# Patient Record
Sex: FEMALE | Race: WHITE | HISPANIC OR LATINO | Employment: FULL TIME | URBAN - METROPOLITAN AREA
[De-identification: names, ages, dates, MRNs, and addresses within clinical notes are randomized per-mention and may not be internally consistent; named-entity substitution may affect disease eponyms.]

---

## 2017-03-09 ENCOUNTER — HOSPITAL ENCOUNTER (EMERGENCY)
Facility: HOSPITAL | Age: 37
Discharge: HOME | End: 2017-03-09
Admitting: EMERGENCY MEDICINE
Payer: COMMERCIAL

## 2017-03-09 ENCOUNTER — HOSPITAL ENCOUNTER (OUTPATIENT)
Dept: RADIOLOGY | Facility: HOSPITAL | Age: 37
Discharge: HOME | End: 2017-03-09
Attending: EMERGENCY MEDICINE
Payer: COMMERCIAL

## 2017-03-09 DIAGNOSIS — R52 PAIN: ICD-10-CM

## 2017-03-09 PROCEDURE — 71101 X-RAY EXAM UNILAT RIBS/CHEST: CPT

## 2017-03-09 PROCEDURE — 71120 X-RAY EXAM BREASTBONE 2/>VWS: CPT

## 2017-03-09 PROCEDURE — 99283 EMERGENCY DEPT VISIT LOW MDM: CPT

## 2017-03-09 RX ADMIN — IBUPROFEN 800 MG: 200 TABLET, SUGAR COATED ORAL at 22:50

## 2017-03-10 VITALS
OXYGEN SATURATION: 98 % | TEMPERATURE: 97.8 F | SYSTOLIC BLOOD PRESSURE: 128 MMHG | HEART RATE: 74 BPM | WEIGHT: 287 LBS | RESPIRATION RATE: 18 BRPM | DIASTOLIC BLOOD PRESSURE: 70 MMHG

## 2017-04-04 ENCOUNTER — HOSPITAL ENCOUNTER (EMERGENCY)
Facility: HOSPITAL | Age: 37
Discharge: HOME | End: 2017-04-04
Attending: EMERGENCY MEDICINE | Admitting: EMERGENCY MEDICINE
Payer: COMMERCIAL

## 2017-04-04 ENCOUNTER — APPOINTMENT (EMERGENCY)
Dept: CT IMAGING | Facility: HOSPITAL | Age: 37
End: 2017-04-04
Payer: COMMERCIAL

## 2017-04-04 VITALS
RESPIRATION RATE: 16 BRPM | TEMPERATURE: 98 F | HEART RATE: 79 BPM | OXYGEN SATURATION: 100 % | WEIGHT: 271 LBS | SYSTOLIC BLOOD PRESSURE: 119 MMHG | DIASTOLIC BLOOD PRESSURE: 69 MMHG

## 2017-04-04 DIAGNOSIS — K52.9 COLITIS: Primary | ICD-10-CM

## 2017-04-04 LAB
ALBUMIN SERPL BCP-MCNC: 3.7 G/DL (ref 3.5–5)
ALP SERPL-CCNC: 93 U/L (ref 46–116)
ALT SERPL W P-5'-P-CCNC: 20 U/L (ref 12–78)
ANION GAP SERPL CALCULATED.3IONS-SCNC: 8 MMOL/L (ref 4–13)
AST SERPL W P-5'-P-CCNC: 12 U/L (ref 5–45)
BASOPHILS # BLD AUTO: 0.01 THOUSANDS/ΜL (ref 0–0.1)
BASOPHILS NFR BLD AUTO: 0 % (ref 0–1)
BILIRUB SERPL-MCNC: 0.52 MG/DL (ref 0.2–1)
BILIRUB UR QL STRIP: NEGATIVE
BUN SERPL-MCNC: 12 MG/DL (ref 5–25)
CALCIUM SERPL-MCNC: 8.4 MG/DL (ref 8.3–10.1)
CHLORIDE SERPL-SCNC: 100 MMOL/L (ref 100–108)
CLARITY UR: CLEAR
CO2 SERPL-SCNC: 29 MMOL/L (ref 21–32)
COLOR UR: YELLOW
COLOR, POC: YELLOW
CREAT SERPL-MCNC: 0.72 MG/DL (ref 0.6–1.3)
EOSINOPHIL # BLD AUTO: 0.09 THOUSAND/ΜL (ref 0–0.61)
EOSINOPHIL NFR BLD AUTO: 1 % (ref 0–6)
ERYTHROCYTE [DISTWIDTH] IN BLOOD BY AUTOMATED COUNT: 16.7 % (ref 11.6–15.1)
GFR SERPL CREATININE-BSD FRML MDRD: >60 ML/MIN/1.73SQ M
GLUCOSE SERPL-MCNC: 105 MG/DL (ref 65–140)
GLUCOSE UR STRIP-MCNC: NEGATIVE MG/DL
HCG UR QL: NEGATIVE
HCT VFR BLD AUTO: 41.2 % (ref 34.8–46.1)
HGB BLD-MCNC: 13.1 G/DL (ref 11.5–15.4)
HGB UR QL STRIP.AUTO: NEGATIVE
KETONES UR STRIP-MCNC: NEGATIVE MG/DL
LEUKOCYTE ESTERASE UR QL STRIP: NEGATIVE
LIPASE SERPL-CCNC: 80 U/L (ref 73–393)
LYMPHOCYTES # BLD AUTO: 1.09 THOUSANDS/ΜL (ref 0.6–4.47)
LYMPHOCYTES NFR BLD AUTO: 15 % (ref 14–44)
MCH RBC QN AUTO: 26.4 PG (ref 26.8–34.3)
MCHC RBC AUTO-ENTMCNC: 31.8 G/DL (ref 31.4–37.4)
MCV RBC AUTO: 83 FL (ref 82–98)
MONOCYTES # BLD AUTO: 0.45 THOUSAND/ΜL (ref 0.17–1.22)
MONOCYTES NFR BLD AUTO: 6 % (ref 4–12)
NEUTROPHILS # BLD AUTO: 5.64 THOUSANDS/ΜL (ref 1.85–7.62)
NEUTS SEG NFR BLD AUTO: 78 % (ref 43–75)
NITRITE UR QL STRIP: NEGATIVE
NRBC BLD AUTO-RTO: 0 /100 WBCS
PH UR STRIP.AUTO: 5 [PH] (ref 4.5–8)
PLATELET # BLD AUTO: 245 THOUSANDS/UL (ref 149–390)
PMV BLD AUTO: 10.6 FL (ref 8.9–12.7)
POTASSIUM SERPL-SCNC: 3.7 MMOL/L (ref 3.5–5.3)
PROT SERPL-MCNC: 7.2 G/DL (ref 6.4–8.2)
PROT UR STRIP-MCNC: NEGATIVE MG/DL
RBC # BLD AUTO: 4.97 MILLION/UL (ref 3.81–5.12)
SODIUM SERPL-SCNC: 137 MMOL/L (ref 136–145)
SP GR UR STRIP.AUTO: >=1.03 (ref 1–1.03)
UROBILINOGEN UR QL STRIP.AUTO: 0.2 E.U./DL
WBC # BLD AUTO: 7.28 THOUSAND/UL (ref 4.31–10.16)

## 2017-04-04 PROCEDURE — 74177 CT ABD & PELVIS W/CONTRAST: CPT

## 2017-04-04 PROCEDURE — 85025 COMPLETE CBC W/AUTO DIFF WBC: CPT | Performed by: EMERGENCY MEDICINE

## 2017-04-04 PROCEDURE — 81003 URINALYSIS AUTO W/O SCOPE: CPT

## 2017-04-04 PROCEDURE — 96375 TX/PRO/DX INJ NEW DRUG ADDON: CPT

## 2017-04-04 PROCEDURE — 81002 URINALYSIS NONAUTO W/O SCOPE: CPT

## 2017-04-04 PROCEDURE — 80053 COMPREHEN METABOLIC PANEL: CPT | Performed by: EMERGENCY MEDICINE

## 2017-04-04 PROCEDURE — 83690 ASSAY OF LIPASE: CPT | Performed by: EMERGENCY MEDICINE

## 2017-04-04 PROCEDURE — 96361 HYDRATE IV INFUSION ADD-ON: CPT

## 2017-04-04 PROCEDURE — 96374 THER/PROPH/DIAG INJ IV PUSH: CPT

## 2017-04-04 PROCEDURE — 81025 URINE PREGNANCY TEST: CPT

## 2017-04-04 PROCEDURE — 99284 EMERGENCY DEPT VISIT MOD MDM: CPT

## 2017-04-04 PROCEDURE — 36415 COLL VENOUS BLD VENIPUNCTURE: CPT | Performed by: EMERGENCY MEDICINE

## 2017-04-04 RX ORDER — PHENTERMINE HYDROCHLORIDE 37.5 MG/1
37.5 TABLET ORAL
COMMUNITY
End: 2018-12-10 | Stop reason: SDUPTHER

## 2017-04-04 RX ORDER — LISINOPRIL 5 MG/1
5 TABLET ORAL DAILY
COMMUNITY
End: 2018-12-10 | Stop reason: SDUPTHER

## 2017-04-04 RX ORDER — ONDANSETRON 2 MG/ML
4 INJECTION INTRAMUSCULAR; INTRAVENOUS ONCE
Status: COMPLETED | OUTPATIENT
Start: 2017-04-04 | End: 2017-04-04

## 2017-04-04 RX ORDER — LEVOTHYROXINE SODIUM 0.2 MG/1
200 TABLET ORAL DAILY
COMMUNITY
End: 2018-12-10 | Stop reason: SDUPTHER

## 2017-04-04 RX ORDER — ONDANSETRON 4 MG/1
4 TABLET, FILM COATED ORAL EVERY 6 HOURS
Qty: 15 TABLET | Refills: 0 | Status: SHIPPED | OUTPATIENT
Start: 2017-04-04 | End: 2018-12-10

## 2017-04-04 RX ORDER — MORPHINE SULFATE 4 MG/ML
4 INJECTION, SOLUTION INTRAMUSCULAR; INTRAVENOUS ONCE
Status: COMPLETED | OUTPATIENT
Start: 2017-04-04 | End: 2017-04-04

## 2017-04-04 RX ORDER — DIPHENOXYLATE HYDROCHLORIDE AND ATROPINE SULFATE 2.5; .025 MG/1; MG/1
1 TABLET ORAL 4 TIMES DAILY PRN
Qty: 20 TABLET | Refills: 0 | Status: SHIPPED | OUTPATIENT
Start: 2017-04-04 | End: 2018-12-10

## 2017-04-04 RX ADMIN — MORPHINE SULFATE 4 MG: 4 INJECTION, SOLUTION INTRAMUSCULAR; INTRAVENOUS at 19:38

## 2017-04-04 RX ADMIN — SODIUM CHLORIDE 1000 ML: 0.9 INJECTION, SOLUTION INTRAVENOUS at 19:37

## 2017-04-04 RX ADMIN — ONDANSETRON 4 MG: 2 INJECTION INTRAMUSCULAR; INTRAVENOUS at 19:38

## 2017-04-04 RX ADMIN — IOHEXOL 100 ML: 350 INJECTION, SOLUTION INTRAVENOUS at 20:20

## 2018-06-25 ENCOUNTER — TRANSCRIBE ORDERS (OUTPATIENT)
Dept: ADMINISTRATIVE | Facility: HOSPITAL | Age: 38
End: 2018-06-25

## 2018-06-25 DIAGNOSIS — N28.9 KIDNEY DISEASE: Primary | ICD-10-CM

## 2018-06-25 DIAGNOSIS — Z12.39 SCREENING BREAST EXAMINATION: ICD-10-CM

## 2018-06-26 ENCOUNTER — HOSPITAL ENCOUNTER (OUTPATIENT)
Dept: ULTRASOUND IMAGING | Facility: HOSPITAL | Age: 38
Discharge: HOME | End: 2018-06-26
Payer: COMMERCIAL

## 2018-06-26 ENCOUNTER — HOSPITAL ENCOUNTER (OUTPATIENT)
Dept: MAMMOGRAPHY | Facility: HOSPITAL | Age: 38
Discharge: HOME | End: 2018-06-26
Payer: COMMERCIAL

## 2018-06-26 DIAGNOSIS — N28.9 KIDNEY DISEASE: ICD-10-CM

## 2018-06-26 DIAGNOSIS — Z12.39 SCREENING BREAST EXAMINATION: ICD-10-CM

## 2018-06-26 PROCEDURE — 77067 SCR MAMMO BI INCL CAD: CPT

## 2018-06-26 PROCEDURE — 76770 US EXAM ABDO BACK WALL COMP: CPT

## 2018-07-02 ENCOUNTER — TELEPHONE (OUTPATIENT)
Dept: FAMILY MEDICINE CLINIC | Facility: CLINIC | Age: 38
End: 2018-07-02

## 2018-07-05 ENCOUNTER — TELEPHONE (OUTPATIENT)
Dept: FAMILY MEDICINE CLINIC | Facility: CLINIC | Age: 38
End: 2018-07-05

## 2018-07-06 ENCOUNTER — TELEPHONE (OUTPATIENT)
Dept: FAMILY MEDICINE CLINIC | Facility: CLINIC | Age: 38
End: 2018-07-06

## 2018-12-10 ENCOUNTER — OFFICE VISIT (OUTPATIENT)
Dept: FAMILY MEDICINE CLINIC | Facility: CLINIC | Age: 38
End: 2018-12-10
Payer: COMMERCIAL

## 2018-12-10 VITALS
RESPIRATION RATE: 16 BRPM | DIASTOLIC BLOOD PRESSURE: 88 MMHG | SYSTOLIC BLOOD PRESSURE: 130 MMHG | WEIGHT: 292.6 LBS | HEIGHT: 66 IN | BODY MASS INDEX: 47.02 KG/M2 | OXYGEN SATURATION: 96 % | HEART RATE: 86 BPM | TEMPERATURE: 99.3 F

## 2018-12-10 DIAGNOSIS — E66.01 CLASS 3 SEVERE OBESITY DUE TO EXCESS CALORIES WITH SERIOUS COMORBIDITY AND BODY MASS INDEX (BMI) OF 45.0 TO 49.9 IN ADULT (HCC): ICD-10-CM

## 2018-12-10 DIAGNOSIS — E55.9 VITAMIN D DEFICIENCY: ICD-10-CM

## 2018-12-10 DIAGNOSIS — I10 ESSENTIAL HYPERTENSION: ICD-10-CM

## 2018-12-10 DIAGNOSIS — E03.8 OTHER SPECIFIED HYPOTHYROIDISM: Primary | ICD-10-CM

## 2018-12-10 PROBLEM — E66.813 CLASS 3 SEVERE OBESITY DUE TO EXCESS CALORIES WITH SERIOUS COMORBIDITY AND BODY MASS INDEX (BMI) OF 45.0 TO 49.9 IN ADULT (HCC): Status: ACTIVE | Noted: 2018-12-10

## 2018-12-10 PROCEDURE — 3075F SYST BP GE 130 - 139MM HG: CPT | Performed by: NURSE PRACTITIONER

## 2018-12-10 PROCEDURE — 3079F DIAST BP 80-89 MM HG: CPT | Performed by: NURSE PRACTITIONER

## 2018-12-10 PROCEDURE — 3008F BODY MASS INDEX DOCD: CPT | Performed by: NURSE PRACTITIONER

## 2018-12-10 PROCEDURE — 1036F TOBACCO NON-USER: CPT | Performed by: NURSE PRACTITIONER

## 2018-12-10 PROCEDURE — 99214 OFFICE O/P EST MOD 30 MIN: CPT | Performed by: NURSE PRACTITIONER

## 2018-12-10 RX ORDER — HYDROCHLOROTHIAZIDE 25 MG/1
25 TABLET ORAL DAILY
COMMUNITY
End: 2018-12-10 | Stop reason: SDUPTHER

## 2018-12-10 RX ORDER — ERGOCALCIFEROL 1.25 MG/1
CAPSULE ORAL
Refills: 1 | COMMUNITY
Start: 2018-12-06 | End: 2018-12-31 | Stop reason: SDUPTHER

## 2018-12-10 RX ORDER — HYDROCHLOROTHIAZIDE 25 MG/1
25 TABLET ORAL DAILY
Qty: 30 TABLET | Refills: 5 | Status: SHIPPED | OUTPATIENT
Start: 2018-12-10 | End: 2019-08-16 | Stop reason: SDUPTHER

## 2018-12-10 RX ORDER — PHENTERMINE HYDROCHLORIDE 37.5 MG/1
37.5 TABLET ORAL DAILY
Qty: 30 TABLET | Refills: 0 | Status: SHIPPED | OUTPATIENT
Start: 2018-12-10 | End: 2019-08-16 | Stop reason: SDUPTHER

## 2018-12-10 RX ORDER — LEVOTHYROXINE SODIUM 0.2 MG/1
200 TABLET ORAL DAILY
Qty: 30 TABLET | Refills: 5 | Status: SHIPPED | OUTPATIENT
Start: 2018-12-10 | End: 2019-08-16 | Stop reason: SDUPTHER

## 2018-12-10 RX ORDER — LISINOPRIL 5 MG/1
5 TABLET ORAL DAILY
Qty: 30 TABLET | Refills: 5 | Status: SHIPPED | OUTPATIENT
Start: 2018-12-10 | End: 2019-08-16 | Stop reason: SDUPTHER

## 2018-12-10 NOTE — ASSESSMENT & PLAN NOTE
Was started on phentermine by Dr Ana Salgado  Will renew medication and have her return in 2 weeks to evaluate her blood pressure

## 2018-12-10 NOTE — PROGRESS NOTES
Assessment/Plan:    Other specified hypothyroidism  Will get a TSH T3 and T4 and adjust medications if needed    Essential hypertension  Blood pressure currently controlled with lisinopril and HCTZ  Will continue with current meds    Class 3 severe obesity due to excess calories with serious comorbidity and body mass index (BMI) of 45 0 to 49 9 in adult [facility])  Was started on phentermine by Dr Luana Cruz  Will renew medication and have her return in 2 weeks to evaluate her blood pressure  Vitamin D deficiency  Will hold vitamin D for now and recheck her level  Will restart as needed       Diagnoses and all orders for this visit:    Other specified hypothyroidism  -     TSH, 3rd generation with Free T4 reflex; Future  -     levothyroxine 200 mcg tablet; Take 1 tablet (200 mcg total) by mouth daily    Essential hypertension  -     Comprehensive metabolic panel; Future  -     hydrochlorothiazide (HYDRODIURIL) 25 mg tablet; Take 1 tablet (25 mg total) by mouth daily  -     lisinopril (ZESTRIL) 5 mg tablet; Take 1 tablet (5 mg total) by mouth daily    Class 3 severe obesity due to excess calories with serious comorbidity and body mass index (BMI) of 45 0 to 49 9 in adult ([facility])  -     CBC and differential; Future  -     Comprehensive metabolic panel; Future  -     Lipid Panel with Direct LDL reflex; Future  -     phentermine (ADIPEX-P) 37 5 MG tablet; Take 1 tablet (37 5 mg total) by mouth daily    Vitamin D deficiency  -     Vitamin D 25 hydroxy; Future    Other orders  -     ergocalciferol (VITAMIN D2) 50,000 units; TAKE ONE SOFTGEL CAPSULE BY MOUTH ONCE EVERY WEEK WITH DINNER  -     Discontinue: hydrochlorothiazide (HYDRODIURIL) 25 mg tablet; Take 25 mg by mouth daily          Subjective:      Patient ID: Mathew Hanson is a 41 y o  female here for med refill    HPI  Lost her insurance and has not been able to get her medication  Having left heel pain  The pain is sharp  Works standing all day    The following portions of the patient's history were reviewed and updated as appropriate:   She  has a past medical history of Class 3 severe obesity due to excess calories with serious comorbidity and body mass index (BMI) of 45 0 to 49 9 in [facility]) (12/10/2018); Depression; Disease of thyroid gland; Edema of both legs (2012); Hypertension; Macrocytosis (2012); and Vitamin D deficiency  She   Patient Active Problem List    Diagnosis Date Noted    Other specified hypothyroidism 12/10/2018    Essential hypertension 12/10/2018    Class 3 severe obesity due to excess calories with serious comorbidity and body mass index (BMI) of 45 0 to 49 9 in [facility]) 12/10/2018    Vitamin D deficiency 12/10/2018     She  has a past surgical history that includes Thyroid surgery and Tubal ligation (Bilateral, 2009)  Her family history includes Diabetes in her family  She  reports that she has never smoked  She has never used smokeless tobacco  She reports that she drinks alcohol  She reports that she does not use drugs  Current Outpatient Prescriptions   Medication Sig Dispense Refill    ergocalciferol (VITAMIN D2) 50,000 units TAKE ONE SOFTGEL CAPSULE BY MOUTH ONCE EVERY WEEK WITH DINNER  1    hydrochlorothiazide (HYDRODIURIL) 25 mg tablet Take 1 tablet (25 mg total) by mouth daily 30 tablet 5    levothyroxine 200 mcg tablet Take 1 tablet (200 mcg total) by mouth daily 30 tablet 5    lisinopril (ZESTRIL) 5 mg tablet Take 1 tablet (5 mg total) by mouth daily 30 tablet 5    phentermine (ADIPEX-P) 37 5 MG tablet Take 1 tablet (37 5 mg total) by mouth daily 30 tablet 0     No current facility-administered medications for this visit        Current Outpatient Prescriptions on File Prior to Visit   Medication Sig    [DISCONTINUED] levothyroxine 200 mcg tablet Take 200 mcg by mouth daily    [DISCONTINUED] lisinopril (ZESTRIL) 5 mg tablet Take 5 mg by mouth daily    [DISCONTINUED] phentermine (ADIPEX-P) 37 5 MG tablet Take 37 5 mg by mouth every morning before breakfast    [DISCONTINUED] diphenoxylate-atropine (LOMOTIL) 2 5-0 025 mg per tablet Take 1 tablet by mouth 4 (four) times a day as needed for diarrhea    [DISCONTINUED] ondansetron (ZOFRAN) 4 mg tablet Take 1 tablet by mouth every 6 (six) hours     No current facility-administered medications on file prior to visit  She has No Known Allergies       Review of Systems   Constitutional: Negative  HENT: Negative  Eyes: Negative  Respiratory: Negative  Cardiovascular: Negative  Gastrointestinal: Negative  Endocrine: Negative  Genitourinary: Negative  Skin: Negative  Allergic/Immunologic: Negative  Neurological: Negative  Hematological: Negative  Psychiatric/Behavioral: Negative  Objective:      /88   Pulse 86   Temp 99 3 °F (37 4 °C) (Tympanic)   Resp 16   Ht 5' 6" (1 676 m)   Wt 133 kg (292 lb 9 6 oz)   SpO2 96%   BMI 47 23 kg/m²          Physical Exam   Constitutional: She is oriented to person, place, and time  She appears well-developed and well-nourished  HENT:   Head: Normocephalic  Right Ear: External ear normal    Left Ear: External ear normal    Nose: Nose normal    Mouth/Throat: Oropharynx is clear and moist    Eyes: Conjunctivae and EOM are normal    Neck: Normal range of motion  Neck supple  No thyromegaly present  Cardiovascular: Normal rate, regular rhythm, normal heart sounds and intact distal pulses  Pulmonary/Chest: Effort normal and breath sounds normal    Abdominal: Soft  Bowel sounds are normal    Musculoskeletal: Normal range of motion  Lymphadenopathy:     She has no cervical adenopathy  Neurological: She is alert and oriented to person, place, and time  She has normal reflexes  Skin: Skin is warm and dry  Psychiatric: She has a normal mood and affect   Her behavior is normal  Judgment and thought content normal

## 2018-12-29 ENCOUNTER — APPOINTMENT (OUTPATIENT)
Dept: LAB | Facility: HOSPITAL | Age: 38
End: 2018-12-29
Payer: COMMERCIAL

## 2018-12-29 ENCOUNTER — TRANSCRIBE ORDERS (OUTPATIENT)
Dept: ADMINISTRATIVE | Facility: HOSPITAL | Age: 38
End: 2018-12-29

## 2018-12-29 DIAGNOSIS — E66.01 CLASS 3 SEVERE OBESITY DUE TO EXCESS CALORIES WITH SERIOUS COMORBIDITY AND BODY MASS INDEX (BMI) OF 45.0 TO 49.9 IN ADULT (HCC): ICD-10-CM

## 2018-12-29 DIAGNOSIS — E03.8 OTHER SPECIFIED HYPOTHYROIDISM: ICD-10-CM

## 2018-12-29 DIAGNOSIS — I10 ESSENTIAL HYPERTENSION: ICD-10-CM

## 2018-12-29 DIAGNOSIS — E55.9 VITAMIN D DEFICIENCY: ICD-10-CM

## 2018-12-29 LAB
25(OH)D3 SERPL-MCNC: 24.8 NG/ML (ref 30–100)
ALBUMIN SERPL BCP-MCNC: 3.8 G/DL (ref 3–5.2)
ALP SERPL-CCNC: 84 U/L (ref 43–122)
ALT SERPL W P-5'-P-CCNC: 23 U/L (ref 9–52)
ANION GAP SERPL CALCULATED.3IONS-SCNC: 8 MMOL/L (ref 5–14)
AST SERPL W P-5'-P-CCNC: 22 U/L (ref 14–36)
BASOPHILS # BLD AUTO: 0 THOUSANDS/ΜL (ref 0–0.1)
BASOPHILS NFR BLD AUTO: 1 % (ref 0–1)
BILIRUB SERPL-MCNC: 0.4 MG/DL
BUN SERPL-MCNC: 14 MG/DL (ref 5–25)
CALCIUM SERPL-MCNC: 8.6 MG/DL (ref 8.4–10.2)
CHLORIDE SERPL-SCNC: 104 MMOL/L (ref 97–108)
CHOLEST SERPL-MCNC: 163 MG/DL
CO2 SERPL-SCNC: 27 MMOL/L (ref 22–30)
CREAT SERPL-MCNC: 0.69 MG/DL (ref 0.6–1.2)
EOSINOPHIL # BLD AUTO: 0.3 THOUSAND/ΜL (ref 0–0.4)
EOSINOPHIL NFR BLD AUTO: 5 % (ref 0–6)
ERYTHROCYTE [DISTWIDTH] IN BLOOD BY AUTOMATED COUNT: 16.6 %
GFR SERPL CREATININE-BSD FRML MDRD: 112 ML/MIN/1.73SQ M
GLUCOSE P FAST SERPL-MCNC: 99 MG/DL (ref 70–99)
HCT VFR BLD AUTO: 36.2 % (ref 36–46)
HDLC SERPL-MCNC: 44 MG/DL (ref 40–59)
HGB BLD-MCNC: 11.3 G/DL (ref 12–16)
LDLC SERPL CALC-MCNC: 98 MG/DL
LYMPHOCYTES # BLD AUTO: 0.9 THOUSANDS/ΜL (ref 0.5–4)
LYMPHOCYTES NFR BLD AUTO: 14 % (ref 25–45)
MCH RBC QN AUTO: 24.6 PG (ref 26–34)
MCHC RBC AUTO-ENTMCNC: 31.3 G/DL (ref 31–36)
MCV RBC AUTO: 79 FL (ref 80–100)
MONOCYTES # BLD AUTO: 0.5 THOUSAND/ΜL (ref 0.2–0.9)
MONOCYTES NFR BLD AUTO: 9 % (ref 1–10)
NEUTROPHILS # BLD AUTO: 4.3 THOUSANDS/ΜL (ref 1.8–7.8)
NEUTS SEG NFR BLD AUTO: 71 % (ref 45–65)
PLATELET # BLD AUTO: 267 THOUSANDS/UL (ref 150–450)
PLATELET BLD QL SMEAR: ADEQUATE
PMV BLD AUTO: 8.5 FL (ref 8.9–12.7)
POTASSIUM SERPL-SCNC: 3.4 MMOL/L (ref 3.6–5)
PROT SERPL-MCNC: 7.1 G/DL (ref 5.9–8.4)
RBC # BLD AUTO: 4.6 MILLION/UL (ref 4–5.2)
RBC MORPH BLD: NORMAL
SODIUM SERPL-SCNC: 139 MMOL/L (ref 137–147)
TRIGL SERPL-MCNC: 107 MG/DL
TSH SERPL DL<=0.05 MIU/L-ACNC: 1.43 UIU/ML (ref 0.47–4.68)
WBC # BLD AUTO: 6 THOUSAND/UL (ref 4.5–11)

## 2018-12-29 PROCEDURE — 82306 VITAMIN D 25 HYDROXY: CPT

## 2018-12-29 PROCEDURE — 85025 COMPLETE CBC W/AUTO DIFF WBC: CPT

## 2018-12-29 PROCEDURE — 84443 ASSAY THYROID STIM HORMONE: CPT

## 2018-12-29 PROCEDURE — 80061 LIPID PANEL: CPT

## 2018-12-29 PROCEDURE — 80053 COMPREHEN METABOLIC PANEL: CPT

## 2018-12-29 PROCEDURE — 36415 COLL VENOUS BLD VENIPUNCTURE: CPT

## 2018-12-31 ENCOUNTER — TELEPHONE (OUTPATIENT)
Dept: FAMILY MEDICINE CLINIC | Facility: CLINIC | Age: 38
End: 2018-12-31

## 2018-12-31 DIAGNOSIS — E55.9 VITAMIN D DEFICIENCY: Primary | ICD-10-CM

## 2018-12-31 DIAGNOSIS — D50.8 IRON DEFICIENCY ANEMIA SECONDARY TO INADEQUATE DIETARY IRON INTAKE: ICD-10-CM

## 2018-12-31 RX ORDER — ERGOCALCIFEROL 1.25 MG/1
50000 CAPSULE ORAL WEEKLY
Qty: 12 CAPSULE | Refills: 2 | Status: SHIPPED | OUTPATIENT
Start: 2018-12-31 | End: 2019-09-17

## 2018-12-31 RX ORDER — FERROUS SULFATE TAB EC 324 MG (65 MG FE EQUIVALENT) 324 (65 FE) MG
324 TABLET DELAYED RESPONSE ORAL
Qty: 60 TABLET | Refills: 5 | Status: SHIPPED | OUTPATIENT
Start: 2018-12-31 | End: 2019-08-16

## 2018-12-31 NOTE — TELEPHONE ENCOUNTER
----- Message from Yun Yun, [address] sent at 12/31/2018  8:52 AM EST -----  Let the pt know she Vit D is low and I will renew her Vit D  She also needs to start on iron for mild anemia, which I will also order

## 2019-01-02 ENCOUNTER — OFFICE VISIT (OUTPATIENT)
Dept: FAMILY MEDICINE CLINIC | Facility: CLINIC | Age: 39
End: 2019-01-02
Payer: COMMERCIAL

## 2019-01-02 VITALS
OXYGEN SATURATION: 98 % | BODY MASS INDEX: 47.06 KG/M2 | TEMPERATURE: 99 F | DIASTOLIC BLOOD PRESSURE: 82 MMHG | WEIGHT: 292.8 LBS | RESPIRATION RATE: 16 BRPM | SYSTOLIC BLOOD PRESSURE: 118 MMHG | HEIGHT: 66 IN | HEART RATE: 72 BPM

## 2019-01-02 DIAGNOSIS — E55.9 VITAMIN D DEFICIENCY: Primary | ICD-10-CM

## 2019-01-02 DIAGNOSIS — E87.6 HYPOKALEMIA: ICD-10-CM

## 2019-01-02 DIAGNOSIS — M79.671 HEEL PAIN, BILATERAL: ICD-10-CM

## 2019-01-02 DIAGNOSIS — D50.8 IRON DEFICIENCY ANEMIA SECONDARY TO INADEQUATE DIETARY IRON INTAKE: ICD-10-CM

## 2019-01-02 DIAGNOSIS — M79.672 HEEL PAIN, BILATERAL: ICD-10-CM

## 2019-01-02 PROCEDURE — 99214 OFFICE O/P EST MOD 30 MIN: CPT | Performed by: NURSE PRACTITIONER

## 2019-01-02 PROCEDURE — 3008F BODY MASS INDEX DOCD: CPT | Performed by: NURSE PRACTITIONER

## 2019-01-02 PROCEDURE — 1036F TOBACCO NON-USER: CPT | Performed by: NURSE PRACTITIONER

## 2019-01-02 NOTE — PROGRESS NOTES
Assessment/Plan:      Diagnoses and all orders for this visit:    Vitamin D deficiency  Comments:  Vit D is 24 8  Will renew Vit D and repeat in 3 months  Orders:  -     Vitamin D 25 hydroxy; Future    Iron deficiency anemia secondary to inadequate dietary iron intake  Comments:  Mild anemia noted with CBC  Will start on iron BID and recheck level in three month  Orders:  -     CBC and differential; Future    Hypokalemia  Comments:  K+ 3 4  Discussed foods to increase K+ to normal but not too much  Will check level and treat if needed  Orders:  -     Comprehensive metabolic panel; Future    Heel pain, bilateral  Comments:  Possible plantar fasciitis  Will refer to Dr Aleksandar Garcia for eval and treat  Orders:  -     Ambulatory referral to Podiatry; Future          Subjective:     Patient ID: Kevin Boss is a 41 y o  female here for f/u    HPI  Pt is here today for lab results  Not having any new issues  Review of Systems   Constitutional: Negative  Respiratory: Negative  Cardiovascular: Negative  Gastrointestinal: Negative  Musculoskeletal: Positive for back pain (from moving furniture)  Skin: Negative  Neurological: Negative  Psychiatric/Behavioral: Negative  Objective:     Physical Exam   Constitutional: She is oriented to person, place, and time  She appears well-developed and well-nourished  Eyes: Conjunctivae and EOM are normal    Cardiovascular: Normal rate, regular rhythm and normal heart sounds  Pulmonary/Chest: Effort normal and breath sounds normal    Musculoskeletal:   Having bilateral heel pain  Tenderness starts at mid foot extending to the heel  No edema noted  Neurological: She is alert and oriented to person, place, and time  Skin: Skin is warm and dry  Psychiatric: She has a normal mood and affect   Her behavior is normal

## 2019-01-07 ENCOUNTER — HOSPITAL ENCOUNTER (OUTPATIENT)
Dept: RADIOLOGY | Facility: HOSPITAL | Age: 39
Discharge: HOME | End: 2019-01-07
Attending: PODIATRIST
Payer: COMMERCIAL

## 2019-01-07 ENCOUNTER — TRANSCRIBE ORDERS (OUTPATIENT)
Dept: ADMINISTRATIVE | Facility: HOSPITAL | Age: 39
End: 2019-01-07

## 2019-01-07 DIAGNOSIS — M79.672 LEFT FOOT PAIN: ICD-10-CM

## 2019-01-07 DIAGNOSIS — M79.672 LEFT FOOT PAIN: Primary | ICD-10-CM

## 2019-01-07 PROCEDURE — 73630 X-RAY EXAM OF FOOT: CPT

## 2019-08-16 ENCOUNTER — OFFICE VISIT (OUTPATIENT)
Dept: FAMILY MEDICINE CLINIC | Facility: CLINIC | Age: 39
End: 2019-08-16
Payer: COMMERCIAL

## 2019-08-16 VITALS
HEART RATE: 78 BPM | TEMPERATURE: 97.6 F | WEIGHT: 293 LBS | DIASTOLIC BLOOD PRESSURE: 90 MMHG | OXYGEN SATURATION: 96 % | HEIGHT: 66 IN | SYSTOLIC BLOOD PRESSURE: 110 MMHG | RESPIRATION RATE: 16 BRPM | BODY MASS INDEX: 47.09 KG/M2

## 2019-08-16 DIAGNOSIS — E55.9 VITAMIN D DEFICIENCY: Primary | ICD-10-CM

## 2019-08-16 DIAGNOSIS — E66.01 CLASS 3 SEVERE OBESITY DUE TO EXCESS CALORIES WITH SERIOUS COMORBIDITY AND BODY MASS INDEX (BMI) OF 45.0 TO 49.9 IN ADULT (HCC): ICD-10-CM

## 2019-08-16 DIAGNOSIS — Z13.220 SCREENING FOR HYPERLIPIDEMIA: ICD-10-CM

## 2019-08-16 DIAGNOSIS — E87.6 HYPOKALEMIA: ICD-10-CM

## 2019-08-16 DIAGNOSIS — I10 ESSENTIAL HYPERTENSION: ICD-10-CM

## 2019-08-16 DIAGNOSIS — E03.8 OTHER SPECIFIED HYPOTHYROIDISM: ICD-10-CM

## 2019-08-16 DIAGNOSIS — D50.8 IRON DEFICIENCY ANEMIA SECONDARY TO INADEQUATE DIETARY IRON INTAKE: ICD-10-CM

## 2019-08-16 PROCEDURE — 3008F BODY MASS INDEX DOCD: CPT | Performed by: NURSE PRACTITIONER

## 2019-08-16 PROCEDURE — 1036F TOBACCO NON-USER: CPT | Performed by: NURSE PRACTITIONER

## 2019-08-16 PROCEDURE — 99214 OFFICE O/P EST MOD 30 MIN: CPT | Performed by: NURSE PRACTITIONER

## 2019-08-16 RX ORDER — LISINOPRIL 5 MG/1
5 TABLET ORAL DAILY
Qty: 30 TABLET | Refills: 5 | Status: SHIPPED | OUTPATIENT
Start: 2019-08-16 | End: 2020-03-02 | Stop reason: SDUPTHER

## 2019-08-16 RX ORDER — HYDROCHLOROTHIAZIDE 25 MG/1
25 TABLET ORAL DAILY
Qty: 30 TABLET | Refills: 5 | Status: SHIPPED | OUTPATIENT
Start: 2019-08-16 | End: 2020-03-02 | Stop reason: SDUPTHER

## 2019-08-16 RX ORDER — LEVOTHYROXINE SODIUM 0.2 MG/1
200 TABLET ORAL DAILY
Qty: 30 TABLET | Refills: 5 | Status: SHIPPED | OUTPATIENT
Start: 2019-08-16 | End: 2020-03-02 | Stop reason: SDUPTHER

## 2019-08-16 RX ORDER — PHENTERMINE HYDROCHLORIDE 37.5 MG/1
37.5 TABLET ORAL DAILY
Qty: 30 TABLET | Refills: 0 | Status: SHIPPED | OUTPATIENT
Start: 2019-08-16 | End: 2020-03-02

## 2019-08-16 NOTE — PROGRESS NOTES
Assessment/Plan:     Other specified hypothyroidism  Will order TSH and refill levothyroxine  Essential hypertension  BP controlled on current dose of lisinopril  Will renew medications  Instructed on weight loss and low-salt diet  Class 3 severe obesity due to excess calories with serious comorbidity and body mass index (BMI) of 45 0 to 49 9 in [facility])  Will reorder phentermine  Medication precautions reviewed and she was instructed to monitor blood pressure at home  Will see back in 1 month    Hypokalemia  Will do CMP to check potassium level and treat as needed  Discussed foods higher in potassium that she should eat on a daily basis  Iron deficiency anemia secondary to inadequate dietary iron intake  Will checks CBC for anemia status  She is stop taking the iron at this time  Screening for hyperlipidemia  Will check lipid panel due to obesity and poor diet and treat as needed    Vitamin D deficiency  Check vitamin-D status to assess whether to continue vitamin-D 35301 units  Diagnoses and all orders for this visit:    Vitamin D deficiency  -     Vitamin D 25 hydroxy; Future    Other specified hypothyroidism  -     levothyroxine 200 mcg tablet; Take 1 tablet (200 mcg total) by mouth daily    Essential hypertension  -     lisinopril (ZESTRIL) 5 mg tablet; Take 1 tablet (5 mg total) by mouth daily  -     hydrochlorothiazide (HYDRODIURIL) 25 mg tablet; Take 1 tablet (25 mg total) by mouth daily    Class 3 severe obesity due to excess calories with serious comorbidity and body mass index (BMI) of 45 0 to 49 9 in [facility])  -     Lipid panel; Future  -     phentermine (ADIPEX-P) 37 5 MG tablet; Take 1 tablet (37 5 mg total) by mouth daily    Iron deficiency anemia secondary to inadequate dietary iron intake    Hypokalemia  -     TSH, 3rd generation with Free T4 reflex; Future    Screening for hyperlipidemia  -     Lipid panel;  Future          Subjective:     Patient ID: Deborah  Curtis Lafleur is a 46 y o  female  HPI  Presents today for chronic condition f/u  She did not have her labs done as of yet  She is tolerating the medication well  Denies having any addition health issues at this time  Review of Systems   Constitutional: Negative  HENT: Negative  Eyes: Negative  Respiratory: Negative  Cardiovascular: Positive for leg swelling (ran out of the water pill)  Gastrointestinal: Negative  Genitourinary: Negative  Musculoskeletal: Negative  Skin: Negative  Allergic/Immunologic: Negative  Neurological: Positive for dizziness (occasional dizziness for no apparent reason)  Hematological: Negative  Psychiatric/Behavioral: Negative  Objective:     Physical Exam   Constitutional: She is oriented to person, place, and time  She appears well-developed and well-nourished  HENT:   Head: Normocephalic  Eyes: Conjunctivae and EOM are normal    Neck: Normal range of motion  Neck supple  No thyromegaly present  Cardiovascular: Normal rate, regular rhythm, normal heart sounds and intact distal pulses  Pulmonary/Chest: Effort normal and breath sounds normal    Abdominal: Soft  Bowel sounds are normal    Musculoskeletal: Normal range of motion  Neurological: She is alert and oriented to person, place, and time  Skin: Skin is warm and dry  Psychiatric: She has a normal mood and affect  Her behavior is normal  Judgment and thought content normal      BMI Counseling: Body mass index is 48 26 kg/m²  Discussed the patient's BMI with her  The BMI is above average  BMI counseling and education was provided to the patient   Nutrition recommendations include reducing portion sizes, decreasing overall calorie intake, 3-5 servings of fruits/vegetables daily, reducing fast food intake, consuming healthier snacks, decreasing soda and/or juice intake, moderation in carbohydrate intake, increasing intake of lean protein, reducing intake of saturated fat and trans fat and reducing intake of cholesterol  Exercise recommendations include moderate aerobic physical activity for 150 minutes/week, vigorous aerobic physical activity for 75 minutes/week and exercising 3-5 times per week  Pharmacotherapy was recommended as ordered

## 2019-08-16 NOTE — PATIENT INSTRUCTIONS
Obesity   AMBULATORY CARE:   Obesity  is when your body mass index (BMI) is greater than 30  Your healthcare provider will use your height and weight to measure your BMI  The risks of obesity include  many health problems, such as injuries or physical disability  You may need tests to check for the following:  · Diabetes     · High blood pressure or high cholesterol     · Heart disease     · Gallbladder or liver disease     · Cancer of the colon, breast, prostate, liver, or kidney     · Sleep apnea     · Arthritis or gout  Seek care immediately if:   · You have a severe headache, confusion, or difficulty speaking  · You have weakness on one side of your body  · You have chest pain, sweating, or shortness of breath  Contact your healthcare provider if:   · You have symptoms of gallbladder or liver disease, such as pain in your upper abdomen  · You have knee or hip pain and discomfort while walking  · You have symptoms of diabetes, such as intense hunger and thirst, and frequent urination  · You have symptoms of sleep apnea, such as snoring or daytime sleepiness  · You have questions or concerns about your condition or care  Treatment for obesity  focuses on helping you lose weight to improve your health  Even a small decrease in BMI can reduce the risk for many health problems  Your healthcare provider will help you set a weight-loss goal   · Lifestyle changes  are the first step in treating obesity  These include making healthy food choices and getting regular physical activity  Your healthcare provider may suggest a weight-loss program that involves coaching, education, and therapy  · Medicine  may help you lose weight when it is used with a healthy diet and physical activity  · Surgery  can help you lose weight if you are very obese and have other health problems  There are several types of weight-loss surgery  Ask your healthcare provider for more information    Be successful losing weight:   · Set small, realistic goals  An example of a small goal is to walk for 20 minutes 5 days a week  Anther goal is to lose 5% of your body weight  · Tell friends, family members, and coworkers about your goals  and ask for their support  Ask a friend to lose weight with you, or join a weight-loss support group  · Identify foods or triggers that may cause you to overeat , and find ways to avoid them  Remove tempting high-calorie foods from your home and workplace  Place a bowl of fresh fruit on your kitchen counter  If stress causes you to eat, then find other ways to cope with stress  · Keep a diary to track what you eat and drink  Also write down how many minutes of physical activity you do each day  Weigh yourself once a week and record it in your diary  Eating changes: You will need to eat 500 to 1,000 fewer calories each day than you currently eat to lose 1 to 2 pounds a week  The following changes will help you cut calories:  · Eat smaller portions  Use small plates, no larger than 9 inches in diameter  Fill your plate half full of fruits and vegetables  Measure your food using measuring cups until you know what a serving size looks like  · Eat 3 meals and 1 or 2 snacks each day  Plan your meals in advance  Traci Keep and eat at home most of the time  Eat slowly  · Eat fruits and vegetables at every meal   They are low in calories and high in fiber, which makes you feel full  Do not add butter, margarine, or cream sauce to vegetables  Use herbs to season steamed vegetables  · Eat less fat and fewer fried foods  Eat more baked or grilled chicken and fish  These protein sources are lower in calories and fat than red meat  Limit fast food  Dress your salads with olive oil and vinegar instead of bottled dressing  · Limit the amount of sugar you eat  Do not drink sugary beverages  Limit alcohol  Activity changes:  Physical activity is good for your body in many ways   It helps you burn calories and build strong muscles  It decreases stress and depression, and improves your mood  It can also help you sleep better  Talk to your healthcare provider before you begin an exercise program   · Exercise for at least 30 minutes 5 days a week  Start slowly  Set aside time each day for physical activity that you enjoy and that is convenient for you  It is best to do both weight training and an activity that increases your heart rate, such as walking, bicycling, or swimming  · Find ways to be more active  Do yard work and housecleaning  Walk up the stairs instead of using elevators  Spend your leisure time going to events that require walking, such as outdoor festivals or fairs  This extra physical activity can help you lose weight and keep it off  Follow up with your healthcare provider as directed: You may need to meet with a dietitian  Write down your questions so you remember to ask them during your visits  © 2017 [facility] [place] Information is for End User's use only and may not be sold, redistributed or otherwise used for commercial purposes  All illustrations and images included in CareNotes® are the copyrighted property of [employer]  or [employer] Marquez  The above information is an  only  It is not intended as medical advice for individual conditions or treatments  Talk to your doctor, nurse or pharmacist before following any medical regimen to see if it is safe and effective for you  Low Fat Diet   AMBULATORY CARE:   A low-fat diet  is an eating plan that is low in total fat, unhealthy fat, and cholesterol  You may need to follow a low-fat diet if you have trouble digesting or absorbing fat  You may also need to follow this diet if you have high cholesterol  You can also lower your cholesterol by increasing the amount of fiber in your diet  Soluble fiber is a type of fiber that helps to decrease cholesterol levels     Different types of fat in food:   · Limit unhealthy fats  A diet that is high in cholesterol, saturated fat, and trans fat may cause unhealthy cholesterol levels  Unhealthy cholesterol levels increase your risk of heart disease  ¨ Cholesterol:  Limit intake of cholesterol to less than 200 mg per day  Cholesterol is found in meat, eggs, and dairy  ¨ Saturated fat:  Limit saturated fat to less than 7% of your total daily calories  Ask your dietitian how many calories you need each day  Saturated fat is found in butter, cheese, ice cream, whole milk, and palm oil  Saturated fat is also found in meat, such as beef, pork, chicken skin, and processed meats  Processed meats include sausage, hot dogs, and bologna  ¨ Trans fat:  Avoid trans fat as much as possible  Trans fat is used in fried and baked foods  Foods that say trans fat free on the label may still have up to 0 5 grams of trans fat per serving  · Include healthy fats  Replace foods that are high in saturated and trans fat with foods high in healthy fats  This may help to decrease high cholesterol levels  ¨ Monounsaturated fats: These are found in avocados, nuts, and vegetable oils, such as olive, canola, and sunflower oil  ¨ Polyunsaturated fats: These can be found in vegetable oils, such as soybean or corn oil  Omega-3 fats can help to decrease the risk of heart disease  Omega-3 fats are found in fish, such as salmon, herring, trout, and tuna  Omega-3 fats can also be found in plant foods, such as walnuts, flaxseed, soybeans, and canola oil    Foods to limit or avoid:   · Grains:      ¨ Snacks that are made with partially hydrogenated oils, such as chips, regular crackers, and butter-flavored popcorn    ¨ High-fat baked goods, such as biscuits, croissants, doughnuts, pies, cookies, and pastries    · Dairy:      ¨ Whole milk, 2% milk, and yogurt and ice cream made with whole milk    ¨ Half and half creamer, heavy cream, and whipping cream    ¨ Cheese, cream cheese, and sour cream    · Meats and proteins:      ¨ High-fat cuts of meat (T-bone steak, regular hamburger, and ribs)    ¨ Fried meat, poultry (turkey and chicken), and fish    ¨ Poultry (chicken and turkey) with skin    ¨ Cold cuts (salami or bologna), hot dogs, cotto, and sausage    ¨ Whole eggs and egg yolks    · Vegetables and fruits with added fat:      ¨ Fried vegetables or vegetables in butter or high-fat sauces, such as cream or cheese sauces    ¨ Fried fruit or fruit served with butter or cream    · Fats:      ¨ Butter, stick margarine, and shortening    ¨ Coconut, palm oil, and palm kernel oil  Foods to include:   · Grains:      ¨ Whole-grain breads, cereals, pasta, and brown rice    ¨ Low-fat crackers and pretzels    · Vegetables and fruits:      ¨ Fresh, frozen, or canned vegetables (no salt or low-sodium)    ¨ Fresh, frozen, dried, or canned fruit (canned in light syrup or fruit juice)    ¨ Avocado    · Low-fat dairy products:      ¨ Nonfat (skim) or 1% milk    ¨ Nonfat or low-fat cheese, yogurt, and cottage cheese    · Meats and proteins:      ¨ Chicken or turkey with no skin    ¨ Baked or broiled fish    ¨ Lean beef and pork (loin, round, extra lean hamburger)    ¨ Beans and peas, unsalted nuts, soy products    ¨ Egg whites and substitutes    ¨ Seeds and nuts    · Fats:      ¨ Unsaturated oil, such as canola, olive, peanut, soybean, or sunflower oil    ¨ Soft or liquid margarine and vegetable oil spread    ¨ Low-fat salad dressing  Other ways to decrease fat:   · Read food labels before you buy foods  Choose foods that have less than 30% of calories from fat  Choose low-fat or fat-free dairy products  Remember that fat free does not mean calorie free  These foods still contain calories, and too many calories can lead to weight gain  · Trim fat from meat and avoid fried food  Trim all visible fat from meat before you cook it  Remove the skin from poultry  Do not mustafa meat, fish, or poultry  Bake, roast, boil, or broil these foods instead  Avoid fried foods  Eat a baked potato instead of [place] fries  Steam vegetables instead of sautéing them in butter  · Add less fat to foods  Use imitation cotto bits on salads and baked potatoes instead of regular cotto bits  Use fat-free or low-fat salad dressings instead of regular dressings  Use low-fat or nonfat butter-flavored topping instead of regular butter or margarine on popcorn and other foods  Ways to decrease fat in recipes:  Replace high-fat ingredients with low-fat or nonfat ones  This may cause baked goods to be drier than usual  You may need to use nonfat cooking spray on pans to prevent food from sticking  You also may need to change the amount of other ingredients, such as water, in the recipe  Try the following:  · Use low-fat or light margarine instead of regular margarine or shortening  · Use lean ground turkey breast or chicken, or lean ground beef (less than 5% fat) instead of hamburger  · Add 1 teaspoon of canola oil to 8 ounces of skim milk instead of using cream or half and half  · Use grated zucchini, carrots, or apples in breads instead of coconut  · Use blenderized, low-fat cottage cheese, plain tofu, or low-fat ricotta cheese instead of cream cheese  · Use 1 egg white and 1 teaspoon of canola oil, or use ¼ cup (2 ounces) of fat-free egg substitute instead of a whole egg  · Replace half of the oil that is called for in a recipe with applesauce when you bake  Use 3 tablespoons of cocoa powder and 1 tablespoon of canola oil instead of a square of baking chocolate  How to increase fiber:  Eat enough high-fiber foods to get 20 to 30 grams of fiber every day  Slowly increase your fiber intake to avoid stomach cramps, gas, and other problems  · Eat 3 ounces of whole-grain foods each day  An ounce is about 1 slice of bread  Eat whole-grain breads, such as whole-wheat bread   Whole wheat, whole-wheat flour, or other whole grains should be listed as the first ingredient on the food label  Replace white flour with whole-grain flour or use half of each in recipes  Whole-grain flour is heavier than white flour, so you may have to add more yeast or baking powder  · Eat a high-fiber cereal for breakfast   Oatmeal is a good source of soluble fiber  Look for cereals that have bran or fiber in the name  Choose whole-grain products, such as brown rice, barley, and whole-wheat pasta  · Eat more beans, peas, and lentils  For example, add beans to soups or salads  Eat at least 5 cups of fruits and vegetables each day  Eat fruits and vegetables with the peel because the peel is high in fiber  © 2017 [address]  Information is for End User's use only and may not be sold, redistributed or otherwise used for commercial purposes  All illustrations and images included in CareNotes® are the copyrighted property of [employer]  or Servando Marquez  The above information is an  only  It is not intended as medical advice for individual conditions or treatments  Talk to your doctor, nurse or pharmacist before following any medical regimen to see if it is safe and effective for you

## 2019-08-16 NOTE — ASSESSMENT & PLAN NOTE
Will do CMP to check potassium level and treat as needed  Discussed foods higher in potassium that she should eat on a daily basis

## 2019-08-16 NOTE — ASSESSMENT & PLAN NOTE
Will reorder phentermine  Medication precautions reviewed and she was instructed to monitor blood pressure at home    Will see back in 1 month

## 2019-08-16 NOTE — ASSESSMENT & PLAN NOTE
BP controlled on current dose of lisinopril  Will renew medications  Instructed on weight loss and low-salt diet

## 2019-09-17 ENCOUNTER — APPOINTMENT (OUTPATIENT)
Dept: LAB | Facility: HOSPITAL | Age: 39
End: 2019-09-17
Payer: COMMERCIAL

## 2019-09-17 DIAGNOSIS — E87.6 HYPOKALEMIA: ICD-10-CM

## 2019-09-17 DIAGNOSIS — E66.01 CLASS 3 SEVERE OBESITY DUE TO EXCESS CALORIES WITH SERIOUS COMORBIDITY AND BODY MASS INDEX (BMI) OF 45.0 TO 49.9 IN ADULT (HCC): ICD-10-CM

## 2019-09-17 DIAGNOSIS — E55.9 VITAMIN D DEFICIENCY: ICD-10-CM

## 2019-09-17 DIAGNOSIS — D50.8 IRON DEFICIENCY ANEMIA SECONDARY TO INADEQUATE DIETARY IRON INTAKE: ICD-10-CM

## 2019-09-17 DIAGNOSIS — E55.9 VITAMIN D DEFICIENCY: Primary | ICD-10-CM

## 2019-09-17 DIAGNOSIS — Z13.220 SCREENING FOR HYPERLIPIDEMIA: ICD-10-CM

## 2019-09-17 LAB
25(OH)D3 SERPL-MCNC: 22.8 NG/ML (ref 30–100)
ALBUMIN SERPL BCP-MCNC: 4 G/DL (ref 3–5.2)
ALP SERPL-CCNC: 86 U/L (ref 43–122)
ALT SERPL W P-5'-P-CCNC: 14 U/L (ref 9–52)
ANION GAP SERPL CALCULATED.3IONS-SCNC: 7 MMOL/L (ref 5–14)
AST SERPL W P-5'-P-CCNC: 20 U/L (ref 14–36)
BASOPHILS # BLD AUTO: 0 THOUSANDS/ΜL (ref 0–0.1)
BASOPHILS NFR BLD AUTO: 1 % (ref 0–1)
BILIRUB SERPL-MCNC: 0.5 MG/DL
BUN SERPL-MCNC: 9 MG/DL (ref 5–25)
CALCIUM SERPL-MCNC: 8.8 MG/DL (ref 8.4–10.2)
CHLORIDE SERPL-SCNC: 100 MMOL/L (ref 97–108)
CHOLEST SERPL-MCNC: 168 MG/DL
CO2 SERPL-SCNC: 31 MMOL/L (ref 22–30)
CREAT SERPL-MCNC: 0.6 MG/DL (ref 0.6–1.2)
EOSINOPHIL # BLD AUTO: 0.2 THOUSAND/ΜL (ref 0–0.4)
EOSINOPHIL NFR BLD AUTO: 4 % (ref 0–6)
ERYTHROCYTE [DISTWIDTH] IN BLOOD BY AUTOMATED COUNT: 17.6 %
GFR SERPL CREATININE-BSD FRML MDRD: 116 ML/MIN/1.73SQ M
GLUCOSE P FAST SERPL-MCNC: 93 MG/DL (ref 70–99)
HCT VFR BLD AUTO: 37 % (ref 36–46)
HDLC SERPL-MCNC: 36 MG/DL (ref 40–59)
HGB BLD-MCNC: 11.7 G/DL (ref 12–16)
LDLC SERPL CALC-MCNC: 107 MG/DL
LYMPHOCYTES # BLD AUTO: 1 THOUSANDS/ΜL (ref 0.5–4)
LYMPHOCYTES NFR BLD AUTO: 24 % (ref 25–45)
MCH RBC QN AUTO: 24.4 PG (ref 26–34)
MCHC RBC AUTO-ENTMCNC: 31.6 G/DL (ref 31–36)
MCV RBC AUTO: 77 FL (ref 80–100)
MICROCYTES BLD QL AUTO: PRESENT
MONOCYTES # BLD AUTO: 0.4 THOUSAND/ΜL (ref 0.2–0.9)
MONOCYTES NFR BLD AUTO: 9 % (ref 1–10)
NEUTROPHILS # BLD AUTO: 2.6 THOUSANDS/ΜL (ref 1.8–7.8)
NEUTS SEG NFR BLD AUTO: 63 % (ref 45–65)
NONHDLC SERPL-MCNC: 132 MG/DL
PLATELET # BLD AUTO: 266 THOUSANDS/UL (ref 150–450)
PLATELET BLD QL SMEAR: ADEQUATE
PMV BLD AUTO: 8.9 FL (ref 8.9–12.7)
POTASSIUM SERPL-SCNC: 3.8 MMOL/L (ref 3.6–5)
PROT SERPL-MCNC: 7.3 G/DL (ref 5.9–8.4)
RBC # BLD AUTO: 4.79 MILLION/UL (ref 4–5.2)
RBC MORPH BLD: NORMAL
SODIUM SERPL-SCNC: 138 MMOL/L (ref 137–147)
TRIGL SERPL-MCNC: 125 MG/DL
TSH SERPL DL<=0.05 MIU/L-ACNC: 0.76 UIU/ML (ref 0.47–4.68)
WBC # BLD AUTO: 4.1 THOUSAND/UL (ref 4.5–11)

## 2019-09-17 PROCEDURE — 84443 ASSAY THYROID STIM HORMONE: CPT

## 2019-09-17 PROCEDURE — 36415 COLL VENOUS BLD VENIPUNCTURE: CPT

## 2019-09-17 PROCEDURE — 80053 COMPREHEN METABOLIC PANEL: CPT

## 2019-09-17 PROCEDURE — 85025 COMPLETE CBC W/AUTO DIFF WBC: CPT

## 2019-09-17 PROCEDURE — 82306 VITAMIN D 25 HYDROXY: CPT

## 2019-09-17 PROCEDURE — 80061 LIPID PANEL: CPT

## 2019-09-17 RX ORDER — FERROUS FUMARATE 324(106)MG
1 TABLET ORAL
Qty: 90 TABLET | Refills: 1 | Status: SHIPPED | OUTPATIENT
Start: 2019-09-17 | End: 2019-09-25 | Stop reason: SDUPTHER

## 2019-09-17 RX ORDER — ERGOCALCIFEROL 1.25 MG/1
50000 CAPSULE ORAL WEEKLY
Qty: 12 CAPSULE | Refills: 1 | Status: SHIPPED | OUTPATIENT
Start: 2019-09-17 | End: 2020-03-02 | Stop reason: ALTCHOICE

## 2019-09-24 ENCOUNTER — TELEPHONE (OUTPATIENT)
Dept: FAMILY MEDICINE CLINIC | Facility: CLINIC | Age: 39
End: 2019-09-24

## 2019-09-24 NOTE — TELEPHONE ENCOUNTER
[address] l/m stating ferrous fumarate 324mg TID showing as a high dose  They need Ozzy's approval to override and disp to pt     Please advise

## 2019-09-25 DIAGNOSIS — D50.8 IRON DEFICIENCY ANEMIA SECONDARY TO INADEQUATE DIETARY IRON INTAKE: ICD-10-CM

## 2019-09-25 RX ORDER — FERROUS FUMARATE 324(106)MG
1 TABLET ORAL 2 TIMES DAILY
Qty: 60 TABLET | Refills: 1 | Status: SHIPPED | OUTPATIENT
Start: 2019-09-25 | End: 2020-03-02 | Stop reason: SDUPTHER

## 2020-03-02 ENCOUNTER — OFFICE VISIT (OUTPATIENT)
Dept: FAMILY MEDICINE CLINIC | Facility: CLINIC | Age: 40
End: 2020-03-02
Payer: COMMERCIAL

## 2020-03-02 VITALS
BODY MASS INDEX: 46.95 KG/M2 | DIASTOLIC BLOOD PRESSURE: 78 MMHG | SYSTOLIC BLOOD PRESSURE: 124 MMHG | OXYGEN SATURATION: 96 % | WEIGHT: 292.13 LBS | TEMPERATURE: 98.4 F | HEART RATE: 76 BPM | HEIGHT: 66 IN | RESPIRATION RATE: 16 BRPM

## 2020-03-02 DIAGNOSIS — Z12.4 CERVICAL CANCER SCREENING: ICD-10-CM

## 2020-03-02 DIAGNOSIS — E03.8 OTHER SPECIFIED HYPOTHYROIDISM: ICD-10-CM

## 2020-03-02 DIAGNOSIS — I10 ESSENTIAL HYPERTENSION: Primary | ICD-10-CM

## 2020-03-02 DIAGNOSIS — D50.8 IRON DEFICIENCY ANEMIA SECONDARY TO INADEQUATE DIETARY IRON INTAKE: ICD-10-CM

## 2020-03-02 DIAGNOSIS — E55.9 VITAMIN D DEFICIENCY: ICD-10-CM

## 2020-03-02 DIAGNOSIS — I83.893 VARICOSE VEINS OF BOTH LEGS WITH EDEMA: ICD-10-CM

## 2020-03-02 DIAGNOSIS — N93.8 DYSFUNCTIONAL UTERINE BLEEDING: ICD-10-CM

## 2020-03-02 DIAGNOSIS — E87.6 HYPOKALEMIA: ICD-10-CM

## 2020-03-02 DIAGNOSIS — Z11.4 ENCOUNTER FOR SCREENING FOR HIV: ICD-10-CM

## 2020-03-02 PROBLEM — E66.813 CLASS 3 SEVERE OBESITY DUE TO EXCESS CALORIES WITH SERIOUS COMORBIDITY AND BODY MASS INDEX (BMI) OF 45.0 TO 49.9 IN ADULT (HCC): Status: RESOLVED | Noted: 2018-12-10 | Resolved: 2020-03-02

## 2020-03-02 PROBLEM — E66.01 CLASS 3 SEVERE OBESITY DUE TO EXCESS CALORIES WITH SERIOUS COMORBIDITY AND BODY MASS INDEX (BMI) OF 45.0 TO 49.9 IN ADULT (HCC): Status: RESOLVED | Noted: 2018-12-10 | Resolved: 2020-03-02

## 2020-03-02 PROCEDURE — 1036F TOBACCO NON-USER: CPT | Performed by: PHYSICIAN ASSISTANT

## 2020-03-02 PROCEDURE — 3078F DIAST BP <80 MM HG: CPT | Performed by: PHYSICIAN ASSISTANT

## 2020-03-02 PROCEDURE — 3008F BODY MASS INDEX DOCD: CPT | Performed by: PHYSICIAN ASSISTANT

## 2020-03-02 PROCEDURE — 99214 OFFICE O/P EST MOD 30 MIN: CPT | Performed by: PHYSICIAN ASSISTANT

## 2020-03-02 PROCEDURE — 3074F SYST BP LT 130 MM HG: CPT | Performed by: PHYSICIAN ASSISTANT

## 2020-03-02 RX ORDER — HYDROCHLOROTHIAZIDE 25 MG/1
25 TABLET ORAL DAILY
Qty: 90 TABLET | Refills: 1 | Status: SHIPPED | OUTPATIENT
Start: 2020-03-02 | End: 2020-08-04 | Stop reason: SDUPTHER

## 2020-03-02 RX ORDER — FERROUS FUMARATE 324(106)MG
1 TABLET ORAL DAILY
Qty: 90 TABLET | Refills: 1 | Status: SHIPPED | OUTPATIENT
Start: 2020-03-02 | End: 2020-08-04 | Stop reason: SDUPTHER

## 2020-03-02 RX ORDER — MULTIVIT WITH MINERALS/LUTEIN
250 TABLET ORAL DAILY
Qty: 90 TABLET | Refills: 1 | Status: SHIPPED | OUTPATIENT
Start: 2020-03-02 | End: 2020-08-04 | Stop reason: SDUPTHER

## 2020-03-02 RX ORDER — LISINOPRIL 5 MG/1
5 TABLET ORAL DAILY
Qty: 90 TABLET | Refills: 1 | Status: SHIPPED | OUTPATIENT
Start: 2020-03-02 | End: 2020-08-04 | Stop reason: SDUPTHER

## 2020-03-02 RX ORDER — LEVOTHYROXINE SODIUM 0.2 MG/1
200 TABLET ORAL DAILY
Qty: 90 TABLET | Refills: 1 | Status: SHIPPED | OUTPATIENT
Start: 2020-03-02 | End: 2020-05-05 | Stop reason: ALTCHOICE

## 2020-03-02 NOTE — PROGRESS NOTES
Assessment/Plan:    Lab values done in September 2019 have been reviewed  Hemoglobin at that time is 11 7     -I did advise her to check with her pharmacist about the flu vaccine since we are currently out of stock  -she does forget to take her vitamin-D 37825 International Units weekly  I did recommend over-the-counter vitamin-D 3 5000 International Units daily instead   -she did have a 7 lb weight loss since August of 2019  She states that she has been watching her portion sizes more closely  She was not aware that [facility] did have a weight management program   She would be willing to meet with the specialist   -she will restart her iron supplement but I did recommend once a day along with vitamin-C 250 mg daily   -refer to gynecology for heavy menstrual cycles and routine cervical cancer screening  -she will proceed with fasting blood work over the next week  -I did recommend follow-up in August 2020    BMI Counseling: Body mass index is 47 15 kg/m²  The BMI is above normal  Nutrition recommendations include reducing portion sizes and decreasing overall calorie intake  Exercise recommendations include exercising 3-5 times per week  Patient referred to weight management due to patient being severely obese  M*Modal software was used to dictate this note  It may contain errors with dictating incorrect words/spelling  Please contact provider directly for any questions  Diagnoses and all orders for this visit:    Essential hypertension    Iron deficiency anemia secondary to inadequate dietary iron intake    Other specified hypothyroidism    Hypokalemia    Vitamin D deficiency    BMI 45 0-49 9, adult ([facility] [address] )  -     Ambulatory referral to Weight Management; Future    Cervical cancer screening  -     Ambulatory referral to Gynecology; Future    Dysfunctional uterine bleeding  -     Ambulatory referral to Gynecology;  Future    Varicose veins of both legs with edema          Subjective:      Patient ID: Michael Brasher is a 46 y o  female  Patient presents today for routine follow-up for hypertension, hypothyroidism  She states that she is compliant with her medications  She also has a history of iron deficiency anemia but she does not take her iron supplement on a daily basis  She states that she forgets about it  She was prescribed the medication twice daily  She does have heavy menstrual cycles at times  She does not have a gynecologist that she follows with on a regular basis  She denies any chest pain, shortness of breath  She does on occasion have swelling of her legs  The following portions of the patient's history were reviewed and updated as appropriate:   She  has a past medical history of Class 3 severe obesity due to excess calories with serious comorbidity and body mass index (BMI) of 45 0 to 49 9 in adult [facility]) (12/10/2018), Depression, Disease of thyroid gland, Edema of both legs (2012), Hypertension, Iron deficiency anemia secondary to inadequate dietary iron intake (1/2/2019), Macrocytosis (2012), and Vitamin D deficiency  She   Patient Active Problem List    Diagnosis Date Noted    [facility] 45 0-49 9[facility] 03/02/2020    Cervical cancer screening 03/02/2020    Dysfunctional uterine bleeding 03/02/2020    Varicose veins of both legs with edema 03/02/2020    Screening for hyperlipidemia 08/16/2019    Iron deficiency anemia secondary to inadequate dietary iron intake 01/02/2019    Hypokalemia 01/02/2019    Other specified hypothyroidism 12/10/2018    Essential hypertension 12/10/2018    Vitamin D deficiency 12/10/2018     She  has a past surgical history that includes Thyroid surgery and Tubal ligation (Bilateral, 2009)  Her family history includes Diabetes in her family  She  reports that she has never smoked  She has never used smokeless tobacco  She reports that she drinks alcohol  She reports that she does not use drugs    Current Outpatient Medications Medication Sig Dispense Refill    ergocalciferol (VITAMIN D2) 50,000 units Take 1 capsule (50,000 Units total) by mouth once a week 12 capsule 1    Ferrous Fumarate 324 (106 Fe) MG TABS Take 1 tablet by mouth 2 (two) times a day 60 tablet 1    hydrochlorothiazide (HYDRODIURIL) 25 mg tablet Take 1 tablet (25 mg total) by mouth daily 30 tablet 5    levothyroxine 200 mcg tablet Take 1 tablet (200 mcg total) by mouth daily 30 tablet 5    lisinopril (ZESTRIL) 5 mg tablet Take 1 tablet (5 mg total) by mouth daily 30 tablet 5     No current facility-administered medications for this visit  Current Outpatient Medications on File Prior to Visit   Medication Sig    ergocalciferol (VITAMIN D2) 50,000 units Take 1 capsule (50,000 Units total) by mouth once a week    Ferrous Fumarate 324 (106 Fe) MG TABS Take 1 tablet by mouth 2 (two) times a day    hydrochlorothiazide (HYDRODIURIL) 25 mg tablet Take 1 tablet (25 mg total) by mouth daily    levothyroxine 200 mcg tablet Take 1 tablet (200 mcg total) by mouth daily    lisinopril (ZESTRIL) 5 mg tablet Take 1 tablet (5 mg total) by mouth daily    [DISCONTINUED] phentermine (ADIPEX-P) 37 5 MG tablet Take 1 tablet (37 5 mg total) by mouth daily (Patient not taking: Reported on 3/2/2020)     No current facility-administered medications on file prior to visit  She has No Known Allergies       Review of Systems   Constitutional: Negative  Respiratory: Negative for cough and shortness of breath  Cardiovascular: Positive for leg swelling  Negative for chest pain  Genitourinary: Positive for menstrual problem  Neurological: Positive for dizziness ( patient states that she did have an episode of dizziness while she was driving last week  She states it was an isolated incident  It did resolve after several minutes she did pull over to the side of the road  )           Objective:      /78 (BP Location: Left arm, Patient Position: Sitting, Cuff Size: Large) Pulse 76   Temp 98 4 °F (36 9 °C) (Tympanic)   Resp 16   Ht 5' 6" (1 676 m)   Wt 133 kg (292 lb 2 oz)   SpO2 96% Comment: pt has nail polish on  BMI 47 15 kg/m²          Physical Exam   Constitutional: She appears well-developed and well-nourished  No distress  HENT:   Head: Normocephalic and atraumatic  Right Ear: External ear normal    Left Ear: External ear normal    Nose: Nose normal    Mouth/Throat: Oropharynx is clear and moist  No oropharyngeal exudate  Neck: Neck supple  No thyromegaly present  Cardiovascular: Normal rate, regular rhythm and normal heart sounds  Exam reveals no gallop and no friction rub  No murmur heard  Pulmonary/Chest: Effort normal and breath sounds normal  No respiratory distress  She has no wheezes  She has no rales  Abdominal: Soft  Bowel sounds are normal  She exhibits no mass  There is no tenderness  Musculoskeletal: She exhibits no deformity  She does have mild varicosities of both lower extremities but there is no edema at this time  Lymphadenopathy:     She has no cervical adenopathy  Neurological: She is alert  Skin: Skin is warm  Psychiatric: She has a normal mood and affect

## 2020-03-20 ENCOUNTER — OFFICE VISIT (OUTPATIENT)
Dept: OBGYN CLINIC | Facility: MEDICAL CENTER | Age: 40
End: 2020-03-20
Payer: COMMERCIAL

## 2020-03-20 VITALS
BODY MASS INDEX: 47.09 KG/M2 | HEIGHT: 66 IN | SYSTOLIC BLOOD PRESSURE: 120 MMHG | DIASTOLIC BLOOD PRESSURE: 70 MMHG | WEIGHT: 293 LBS

## 2020-03-20 DIAGNOSIS — N92.6 IRREGULAR BLEEDING: ICD-10-CM

## 2020-03-20 DIAGNOSIS — Z12.39 SCREENING FOR MALIGNANT NEOPLASM OF BREAST: ICD-10-CM

## 2020-03-20 DIAGNOSIS — R10.2 PELVIC PAIN: Primary | ICD-10-CM

## 2020-03-20 PROCEDURE — 3008F BODY MASS INDEX DOCD: CPT | Performed by: NURSE PRACTITIONER

## 2020-03-20 PROCEDURE — 3074F SYST BP LT 130 MM HG: CPT | Performed by: NURSE PRACTITIONER

## 2020-03-20 PROCEDURE — 1036F TOBACCO NON-USER: CPT | Performed by: NURSE PRACTITIONER

## 2020-03-20 PROCEDURE — 3078F DIAST BP <80 MM HG: CPT | Performed by: NURSE PRACTITIONER

## 2020-03-20 PROCEDURE — 99202 OFFICE O/P NEW SF 15 MIN: CPT | Performed by: NURSE PRACTITIONER

## 2020-03-20 NOTE — PROGRESS NOTES
Assessment Maritza was seen today for menstrual problem  Diagnoses and all orders for this visit:    Pelvic pain  -     US pelvis complete w transvaginal; Future    Screening for malignant neoplasm of breast  -     Mammo screening bilateral w 3d & cad; Future    Irregular bleeding         Plan : sent for us 2/2 pelvic pain  and body habitus making exam difficult to assess uterus/ovaries  Advised to get labs tsh, cbc, etc ordered by pcp   Will call her with results of testing and plan , and to schedule annual exam             Given order to get mammogram done and recommended she continue yearly exams because of her family history  Subjective   Josie Simon is a 46 y o  female here for a problem visit  Patient is complaining having bleeding whenever she takes her iron pills  States menses are monthly and last 3 days  Last period was the end of Feb  But when she started her iron pills she  Began to bleed    States this unscheduled bleeding did not start until she started on her iron pills  She states that Whenever she uses iron pills she has irregular bleeding  She has  Stopped the iron pills  she has a in order from her PCP to get lab work done  Reports last hemoglobin approximately 11  She states  Has  pelvic pain that is not present with intercourse she denies any history of ovarian cyst   She also reports that her mother had breast cancer at age 40  And she is supposed to be getting yearly mammograms    Last mammogram was normal in 2018  She is sexually active in a monogamous relationship for approximately 3 years   Has BTL as her birth control method          Patient Active Problem List   Diagnosis    Other specified hypothyroidism    Essential hypertension    Vitamin D deficiency    Iron deficiency anemia secondary to inadequate dietary iron intake    Hypokalemia    Screening for hyperlipidemia    BMI 45 0-49 9, adult ([facility] [address] )    Cervical cancer screening    Dysfunctional uterine bleeding    Varicose veins of both legs with edema    Encounter for screening for HIV       Gynecologic History  Patient's last menstrual period was 03/17/2020  The current method of family planning is tubal ligation  Past Medical History:   Diagnosis Date    Class 3 severe obesity due to excess calories with serious comorbidity and body mass index (BMI) of 45 0 to 49 9 in adult [facility]) 12/10/2018    Depression     Disease of thyroid gland     Edema of both legs 2012    Hypertension     Iron deficiency anemia secondary to inadequate dietary iron intake 1/2/2019    Macrocytosis 2012    Vitamin D deficiency      Past Surgical History:   Procedure Laterality Date    THYROID SURGERY      ablation    TUBAL LIGATION Bilateral 2009     Family History   Problem Relation Age of Onset    Diabetes Family      Social History     Socioeconomic History    Marital status: Single     Spouse name: Not on file    Number of children: Not on file    Years of education: Not on file    Highest education level: Not on file   Occupational History    Not on file   Social Needs    Financial resource strain: Not on file    Food insecurity:     Worry: Not on file     Inability: Not on file    Transportation needs:     Medical: Not on file     Non-medical: Not on file   Tobacco Use    Smoking status: Never Smoker    Smokeless tobacco: Never Used   Substance and Sexual Activity    Alcohol use:  Yes    Drug use: No    Sexual activity: Yes     Partners: Male     Birth control/protection: Female Sterilization   Lifestyle    Physical activity:     Days per week: Not on file     Minutes per session: Not on file    Stress: Not on file   Relationships    Social connections:     Talks on phone: Not on file     Gets together: Not on file     Attends [employer] service: Not on file     Active member of club or organization: Not on file     Attends meetings of clubs or organizations: Not on file     Relationship status: Not on file    Intimate partner violence:     Fear of current or ex partner: Not on file     Emotionally abused: Not on file     Physically abused: Not on file     Forced sexual activity: Not on file   Other Topics Concern    Not on file   Social History Narrative    Not on file     No Known Allergies    Current Outpatient Medications:     ascorbic acid (VITAMIN C) 250 mg tablet, Take 1 tablet (250 mg total) by mouth daily, Disp: 90 tablet, Rfl: 1    hydrochlorothiazide (HYDRODIURIL) 25 mg tablet, Take 1 tablet (25 mg total) by mouth daily, Disp: 90 tablet, Rfl: 1    levothyroxine 200 mcg tablet, Take 1 tablet (200 mcg total) by mouth daily, Disp: 90 tablet, Rfl: 1    lisinopril (ZESTRIL) 5 mg tablet, Take 1 tablet (5 mg total) by mouth daily, Disp: 90 tablet, Rfl: 1    Ferrous Fumarate 324 (106 Fe) MG TABS, Take 1 tablet by mouth daily (Patient not taking: Reported on 3/20/2020), Disp: 90 tablet, Rfl: 1    Review of Systems  Constitutional :no fever, feels well, no tiredness, no recent weight gain or loss  ENT: no ear ache, no loss of hearing, no nosebleeds or nasal discharge, no sore throat or hoarseness  Cardiovascular: no complaints of slow or fast heart beat, no chest pain, no palpitations, no leg claudication or lower extremity edema  Respiratory: no complaints of shortness of shortness of breath, no VALENZUELA  Breasts:no complaints of breast pain, breast lump, or nipple discharge  Gastrointestinal: no complaints of abdominal pain, constipation, nausea, vomiting, or diarrhea or bloody stools  Genitourinary : no complaints of dysuria, incontinence, pelvic pain, no dysmenorrhea, vaginal discharge or abnormal vaginal bleeding and as noted in HPI  Musculoskeletal: no complaints of arthralgia, no myalgia, no joint swelling or stiffness, no limb pain or swelling    Integumentary: no complaints of skin rash or lesion, itching or dry skin  Neurological: no complaints of headache, no confusion, no numbness or tingling, no dizziness or fainting     Objective     /70   Ht 5' 6" (1 676 m)   Wt 136 kg (299 lb)   LMP 03/17/2020   BMI 48 26 kg/m²     General:   appears stated age, cooperative, alert normal mood and affect   Skin normal skin turgor and no rashes     Psychiatric orientation to person, place, and time: normal  mood and affect: normal

## 2020-04-17 ENCOUNTER — TELEPHONE (OUTPATIENT)
Dept: OBGYN CLINIC | Facility: MEDICAL CENTER | Age: 40
End: 2020-04-17

## 2020-04-21 ENCOUNTER — TELEMEDICINE (OUTPATIENT)
Dept: BARIATRICS | Facility: CLINIC | Age: 40
End: 2020-04-21
Payer: COMMERCIAL

## 2020-04-21 VITALS — HEIGHT: 66 IN | BODY MASS INDEX: 47.09 KG/M2 | WEIGHT: 293 LBS

## 2020-04-21 DIAGNOSIS — E55.9 VITAMIN D DEFICIENCY: ICD-10-CM

## 2020-04-21 DIAGNOSIS — E66.01 OBESITY, CLASS III, BMI 40-49.9 (MORBID OBESITY) (HCC): Primary | ICD-10-CM

## 2020-04-21 DIAGNOSIS — D50.8 IRON DEFICIENCY ANEMIA SECONDARY TO INADEQUATE DIETARY IRON INTAKE: ICD-10-CM

## 2020-04-21 DIAGNOSIS — I10 ESSENTIAL HYPERTENSION: ICD-10-CM

## 2020-04-21 DIAGNOSIS — E03.8 OTHER SPECIFIED HYPOTHYROIDISM: ICD-10-CM

## 2020-04-21 DIAGNOSIS — N93.8 DYSFUNCTIONAL UTERINE BLEEDING: ICD-10-CM

## 2020-04-21 PROBLEM — E66.813 OBESITY, CLASS III, BMI 40-49.9 (MORBID OBESITY): Status: ACTIVE | Noted: 2020-04-21

## 2020-04-21 PROCEDURE — 99243 OFF/OP CNSLTJ NEW/EST LOW 30: CPT | Performed by: FAMILY MEDICINE

## 2020-05-01 ENCOUNTER — APPOINTMENT (OUTPATIENT)
Dept: LAB | Facility: HOSPITAL | Age: 40
End: 2020-05-01
Payer: COMMERCIAL

## 2020-05-01 DIAGNOSIS — I10 ESSENTIAL HYPERTENSION: ICD-10-CM

## 2020-05-01 DIAGNOSIS — I83.893 VARICOSE VEINS OF BOTH LEGS WITH EDEMA: ICD-10-CM

## 2020-05-01 DIAGNOSIS — Z11.4 ENCOUNTER FOR SCREENING FOR HIV: ICD-10-CM

## 2020-05-01 DIAGNOSIS — E55.9 VITAMIN D DEFICIENCY: ICD-10-CM

## 2020-05-01 DIAGNOSIS — E03.8 OTHER SPECIFIED HYPOTHYROIDISM: ICD-10-CM

## 2020-05-01 DIAGNOSIS — D50.8 IRON DEFICIENCY ANEMIA SECONDARY TO INADEQUATE DIETARY IRON INTAKE: ICD-10-CM

## 2020-05-01 DIAGNOSIS — E66.01 OBESITY, CLASS III, BMI 40-49.9 (MORBID OBESITY) (HCC): ICD-10-CM

## 2020-05-01 DIAGNOSIS — E87.6 HYPOKALEMIA: ICD-10-CM

## 2020-05-01 DIAGNOSIS — Z12.4 CERVICAL CANCER SCREENING: ICD-10-CM

## 2020-05-01 DIAGNOSIS — N93.8 DYSFUNCTIONAL UTERINE BLEEDING: ICD-10-CM

## 2020-05-01 LAB
ALBUMIN SERPL BCP-MCNC: 3.8 G/DL (ref 3–5.2)
ALP SERPL-CCNC: 86 U/L (ref 43–122)
ALT SERPL W P-5'-P-CCNC: 16 U/L (ref 9–52)
ANION GAP SERPL CALCULATED.3IONS-SCNC: 7 MMOL/L (ref 5–14)
ANISOCYTOSIS BLD QL SMEAR: PRESENT
AST SERPL W P-5'-P-CCNC: 20 U/L (ref 14–36)
BASOPHILS # BLD AUTO: 0 THOUSANDS/ΜL (ref 0–0.1)
BASOPHILS NFR BLD AUTO: 1 % (ref 0–1)
BILIRUB SERPL-MCNC: 0.5 MG/DL
BUN SERPL-MCNC: 13 MG/DL (ref 5–25)
CALCIUM SERPL-MCNC: 9 MG/DL (ref 8.4–10.2)
CHLORIDE SERPL-SCNC: 102 MMOL/L (ref 97–108)
CO2 SERPL-SCNC: 28 MMOL/L (ref 22–30)
CREAT SERPL-MCNC: 0.58 MG/DL (ref 0.6–1.2)
CREAT UR-MCNC: 186 MG/DL
EOSINOPHIL # BLD AUTO: 0.1 THOUSAND/ΜL (ref 0–0.4)
EOSINOPHIL NFR BLD AUTO: 3 % (ref 0–6)
ERYTHROCYTE [DISTWIDTH] IN BLOOD BY AUTOMATED COUNT: 18.2 %
EST. AVERAGE GLUCOSE BLD GHB EST-MCNC: 108 MG/DL
GFR SERPL CREATININE-BSD FRML MDRD: 117 ML/MIN/1.73SQ M
GLUCOSE P FAST SERPL-MCNC: 91 MG/DL (ref 70–99)
HBA1C MFR BLD: 5.4 %
HCT VFR BLD AUTO: 37.5 % (ref 36–46)
HGB BLD-MCNC: 12 G/DL (ref 12–16)
LYMPHOCYTES # BLD AUTO: 1.1 THOUSANDS/ΜL (ref 0.5–4)
LYMPHOCYTES NFR BLD AUTO: 23 % (ref 25–45)
MCH RBC QN AUTO: 25 PG (ref 26–34)
MCHC RBC AUTO-ENTMCNC: 31.9 G/DL (ref 31–36)
MCV RBC AUTO: 78 FL (ref 80–100)
MICROALBUMIN UR-MCNC: 8.2 MG/L (ref 0–20)
MICROALBUMIN/CREAT 24H UR: 4 MG/G CREATININE (ref 0–30)
MICROCYTES BLD QL AUTO: PRESENT
MONOCYTES # BLD AUTO: 0.5 THOUSAND/ΜL (ref 0.2–0.9)
MONOCYTES NFR BLD AUTO: 10 % (ref 1–10)
NEUTROPHILS # BLD AUTO: 3 THOUSANDS/ΜL (ref 1.8–7.8)
NEUTS SEG NFR BLD AUTO: 64 % (ref 45–65)
PLATELET # BLD AUTO: 256 THOUSANDS/UL (ref 150–450)
PLATELET BLD QL SMEAR: ADEQUATE
PMV BLD AUTO: 9.1 FL (ref 8.9–12.7)
POTASSIUM SERPL-SCNC: 3.6 MMOL/L (ref 3.6–5)
PROT SERPL-MCNC: 7.1 G/DL (ref 5.9–8.4)
RBC # BLD AUTO: 4.79 MILLION/UL (ref 4–5.2)
RBC MORPH BLD: NORMAL
SODIUM SERPL-SCNC: 137 MMOL/L (ref 137–147)
TSH SERPL DL<=0.05 MIU/L-ACNC: 0.06 UIU/ML (ref 0.47–4.68)
WBC # BLD AUTO: 4.6 THOUSAND/UL (ref 4.5–11)

## 2020-05-01 PROCEDURE — 87389 HIV-1 AG W/HIV-1&-2 AB AG IA: CPT

## 2020-05-01 PROCEDURE — 36415 COLL VENOUS BLD VENIPUNCTURE: CPT | Performed by: PHYSICIAN ASSISTANT

## 2020-05-01 PROCEDURE — 82043 UR ALBUMIN QUANTITATIVE: CPT

## 2020-05-01 PROCEDURE — 85025 COMPLETE CBC W/AUTO DIFF WBC: CPT | Performed by: PHYSICIAN ASSISTANT

## 2020-05-01 PROCEDURE — 80053 COMPREHEN METABOLIC PANEL: CPT | Performed by: PHYSICIAN ASSISTANT

## 2020-05-01 PROCEDURE — 84443 ASSAY THYROID STIM HORMONE: CPT

## 2020-05-01 PROCEDURE — 83036 HEMOGLOBIN GLYCOSYLATED A1C: CPT

## 2020-05-01 PROCEDURE — 82570 ASSAY OF URINE CREATININE: CPT

## 2020-05-04 LAB — HIV 1+2 AB+HIV1 P24 AG SERPL QL IA: NORMAL

## 2020-05-05 DIAGNOSIS — E03.8 OTHER SPECIFIED HYPOTHYROIDISM: Primary | ICD-10-CM

## 2020-05-05 RX ORDER — LEVOTHYROXINE SODIUM 175 UG/1
175 TABLET ORAL
Qty: 90 TABLET | Refills: 0 | Status: SHIPPED | OUTPATIENT
Start: 2020-05-05 | End: 2020-08-04 | Stop reason: SDUPTHER

## 2020-05-07 ENCOUNTER — TELEPHONE (OUTPATIENT)
Dept: FAMILY MEDICINE CLINIC | Facility: CLINIC | Age: 40
End: 2020-05-07

## 2020-05-21 ENCOUNTER — TELEMEDICINE (OUTPATIENT)
Dept: BARIATRICS | Facility: CLINIC | Age: 40
End: 2020-05-21
Payer: COMMERCIAL

## 2020-05-21 VITALS — WEIGHT: 293 LBS | BODY MASS INDEX: 47.09 KG/M2 | HEIGHT: 66 IN

## 2020-05-21 DIAGNOSIS — E66.01 OBESITY, CLASS III, BMI 40-49.9 (MORBID OBESITY) (HCC): Primary | ICD-10-CM

## 2020-05-21 DIAGNOSIS — E03.8 OTHER SPECIFIED HYPOTHYROIDISM: ICD-10-CM

## 2020-05-21 DIAGNOSIS — I10 ESSENTIAL HYPERTENSION: ICD-10-CM

## 2020-05-21 PROCEDURE — 99214 OFFICE O/P EST MOD 30 MIN: CPT | Performed by: FAMILY MEDICINE

## 2020-06-19 ENCOUNTER — TELEMEDICINE (OUTPATIENT)
Dept: BARIATRICS | Facility: CLINIC | Age: 40
End: 2020-06-19
Payer: COMMERCIAL

## 2020-06-19 VITALS — WEIGHT: 293 LBS | HEIGHT: 66 IN | BODY MASS INDEX: 47.09 KG/M2

## 2020-06-19 DIAGNOSIS — I10 ESSENTIAL HYPERTENSION: ICD-10-CM

## 2020-06-19 DIAGNOSIS — E66.01 OBESITY, CLASS III, BMI 40-49.9 (MORBID OBESITY) (HCC): Primary | ICD-10-CM

## 2020-06-19 PROCEDURE — 99213 OFFICE O/P EST LOW 20 MIN: CPT | Performed by: FAMILY MEDICINE

## 2020-07-14 ENCOUNTER — HOSPITAL ENCOUNTER (OUTPATIENT)
Dept: ULTRASOUND IMAGING | Facility: HOSPITAL | Age: 40
Discharge: HOME | End: 2020-07-14
Payer: COMMERCIAL

## 2020-07-14 DIAGNOSIS — R10.2 PELVIC PAIN: ICD-10-CM

## 2020-07-14 PROCEDURE — 76856 US EXAM PELVIC COMPLETE: CPT

## 2020-07-14 PROCEDURE — 76830 TRANSVAGINAL US NON-OB: CPT

## 2020-07-30 ENCOUNTER — TELEPHONE (OUTPATIENT)
Dept: FAMILY MEDICINE CLINIC | Facility: CLINIC | Age: 40
End: 2020-07-30

## 2020-07-30 NOTE — TELEPHONE ENCOUNTER
Please call her to remind her to do her thyroid blood test that is in the system over the next several days which does not need to be fasting in preparation for her appointment on Tuesday August 4th at 10:15 a m    Thank you

## 2020-07-31 ENCOUNTER — OFFICE VISIT (OUTPATIENT)
Dept: BARIATRICS | Facility: CLINIC | Age: 40
End: 2020-07-31
Payer: COMMERCIAL

## 2020-07-31 VITALS
WEIGHT: 293 LBS | BODY MASS INDEX: 47.09 KG/M2 | TEMPERATURE: 97.1 F | DIASTOLIC BLOOD PRESSURE: 70 MMHG | HEART RATE: 79 BPM | SYSTOLIC BLOOD PRESSURE: 112 MMHG | HEIGHT: 66 IN

## 2020-07-31 DIAGNOSIS — E03.8 OTHER SPECIFIED HYPOTHYROIDISM: ICD-10-CM

## 2020-07-31 DIAGNOSIS — I10 ESSENTIAL HYPERTENSION: ICD-10-CM

## 2020-07-31 DIAGNOSIS — E66.01 OBESITY, CLASS III, BMI 40-49.9 (MORBID OBESITY) (HCC): Primary | ICD-10-CM

## 2020-07-31 PROCEDURE — 3078F DIAST BP <80 MM HG: CPT | Performed by: FAMILY MEDICINE

## 2020-07-31 PROCEDURE — 3008F BODY MASS INDEX DOCD: CPT | Performed by: FAMILY MEDICINE

## 2020-07-31 PROCEDURE — 1036F TOBACCO NON-USER: CPT | Performed by: FAMILY MEDICINE

## 2020-07-31 PROCEDURE — 99214 OFFICE O/P EST MOD 30 MIN: CPT | Performed by: FAMILY MEDICINE

## 2020-07-31 PROCEDURE — 3074F SYST BP LT 130 MM HG: CPT | Performed by: FAMILY MEDICINE

## 2020-07-31 NOTE — PROGRESS NOTES
Assessment/Plan:    No problem-specific Assessment & Plan notes found for this encounter  Diagnoses and all orders for this visit:    Obesity, Class III, BMI 40-49 9 (morbid obesity) ([facility] [address] )  -     Ambulatory referral to Sleep Medicine; Future    BMI 45 0-49 9, adult [facility])  -     Ambulatory referral to Sleep Medicine; Future    Essential hypertension  -     Ambulatory referral to Sleep Medicine; Future    Other specified hypothyroidism  -     Ambulatory referral to Sleep Medicine; Future        -Patient is pursuing Pratik-En-Y Gastric Bypass and Vertical Sleeve Gastrectomy [facility] MWM 4/6  -Initial weight loss goal of 5-10% weight loss for improved health  -Screening labs- 5/1/20  - per pt she couldn't figure out the online video  - not login  - eating more vegetables   - discussed changes and motivation       Initial: 297lbs  Current: 301 9lbs  Change: +4 9  Goal: 5%     Goals:  Food log (ie ) www myfitnesspal com,sparkpeople  com,loseit com,calorieking  com,etc  baritastic  No sugary beverages  At least 64oz of water daily  Increase physical activity by 10 minutes daily  Gradually increase physical activity to a goal of 5 days per week for 30 minutes of MODERATE intensity PLUS 2 days per week of FULL BODY resistance training  5-10 servings of fruits and vegetables per day and 6112-8671 calories per day    Follow up in approximately 1 month with Non-Surgical Physician/Advanced Practitioner  Subjective:   Chief Complaint   Patient presents with    Follow-up     MWM 4-5 wk follow up       Patient ID: Jimmy Hudson  is a 45 y o  female with excess weight/obesity here to pursue weight management  Patient is pursuing Conservative Program      HPI     4/6 CBC MWM wt check  Gained wt       Nutrition: eating smaller portions, drinking more water  She was unable to follow meal plan bc she lost it  Not login, she is unable to understand the [employer] [place]   Eating more vegetables x 2 times a week   She has eliminated soda completely       Exercise: she has stopped walking  Water: 3-4 bottles a day     Sleep: 7-8hrs  behavioral changes: ? The following portions of the patient's history were reviewed and updated as appropriate: allergies, current medications, past family history, past medical history, past social history, past surgical history and problem list     Review of Systems   Constitutional: Negative for activity change and appetite change  Respiratory: Negative  Cardiovascular: Negative  Gastrointestinal: Negative  Genitourinary: Negative  Musculoskeletal: Negative for arthralgias  Skin: Negative for rash  Psychiatric/Behavioral: Negative  Objective:    /70 (BP Location: Left arm, Patient Position: Sitting, Cuff Size: Large)   Pulse 79   Temp (!) 97 1 °F (36 2 °C)   Ht 5' 6" (1 676 m)   Wt (!) 137 kg (301 lb 14 4 oz)   BMI 48 73 kg/m²      Physical Exam    Constitutional   General appearance: Abnormal   well developed and morbidly obese  Eyes No conjunctival pallor  Ears, Nose, Mouth, and Throat Oral mucosa moist    Pulmonary   Respiratory effort: No increased work of breathing or signs of respiratory distress  Auscultation of lungs: Clear to auscultation, equal breath sounds bilaterally, no wheezes, no rales, no rhonci  Cardiovascular   Auscultation of heart: Normal rate and rhythm, normal S1 and S2, without murmurs  Examination of extremities for edema and/or varicosities: Normal   no edema  Abdomen   Abdomen: Abnormal   The abdomen was obese  Bowel sounds were normal  The abdomen was soft and nontender     Musculoskeletal   Gait and station: Normal     Psychiatric   Orientation to person, place and time: Normal     Affect: appropriate

## 2020-08-01 ENCOUNTER — APPOINTMENT (OUTPATIENT)
Dept: LAB | Facility: HOSPITAL | Age: 40
End: 2020-08-01
Payer: COMMERCIAL

## 2020-08-01 DIAGNOSIS — E03.8 OTHER SPECIFIED HYPOTHYROIDISM: ICD-10-CM

## 2020-08-01 LAB — TSH SERPL DL<=0.05 MIU/L-ACNC: 1.22 UIU/ML (ref 0.36–3.74)

## 2020-08-01 PROCEDURE — 36415 COLL VENOUS BLD VENIPUNCTURE: CPT

## 2020-08-01 PROCEDURE — 84443 ASSAY THYROID STIM HORMONE: CPT

## 2020-08-04 ENCOUNTER — OFFICE VISIT (OUTPATIENT)
Dept: FAMILY MEDICINE CLINIC | Facility: CLINIC | Age: 40
End: 2020-08-04
Payer: COMMERCIAL

## 2020-08-04 VITALS
HEIGHT: 66 IN | OXYGEN SATURATION: 98 % | DIASTOLIC BLOOD PRESSURE: 80 MMHG | BODY MASS INDEX: 47.09 KG/M2 | TEMPERATURE: 97.8 F | HEART RATE: 72 BPM | SYSTOLIC BLOOD PRESSURE: 126 MMHG | RESPIRATION RATE: 16 BRPM | WEIGHT: 293 LBS

## 2020-08-04 DIAGNOSIS — I10 ESSENTIAL HYPERTENSION: ICD-10-CM

## 2020-08-04 DIAGNOSIS — E66.01 OBESITY, CLASS III, BMI 40-49.9 (MORBID OBESITY) (HCC): ICD-10-CM

## 2020-08-04 DIAGNOSIS — E89.0 HYPOTHYROIDISM FOLLOWING RADIOIODINE THERAPY: Primary | ICD-10-CM

## 2020-08-04 DIAGNOSIS — E03.8 OTHER SPECIFIED HYPOTHYROIDISM: ICD-10-CM

## 2020-08-04 DIAGNOSIS — D50.8 IRON DEFICIENCY ANEMIA SECONDARY TO INADEQUATE DIETARY IRON INTAKE: ICD-10-CM

## 2020-08-04 PROCEDURE — 99214 OFFICE O/P EST MOD 30 MIN: CPT | Performed by: PHYSICIAN ASSISTANT

## 2020-08-04 PROCEDURE — 3074F SYST BP LT 130 MM HG: CPT | Performed by: PHYSICIAN ASSISTANT

## 2020-08-04 PROCEDURE — 3008F BODY MASS INDEX DOCD: CPT | Performed by: PHYSICIAN ASSISTANT

## 2020-08-04 PROCEDURE — 1036F TOBACCO NON-USER: CPT | Performed by: PHYSICIAN ASSISTANT

## 2020-08-04 PROCEDURE — 3725F SCREEN DEPRESSION PERFORMED: CPT | Performed by: PHYSICIAN ASSISTANT

## 2020-08-04 PROCEDURE — 3079F DIAST BP 80-89 MM HG: CPT | Performed by: PHYSICIAN ASSISTANT

## 2020-08-04 RX ORDER — HYDROCHLOROTHIAZIDE 25 MG/1
25 TABLET ORAL DAILY
Qty: 90 TABLET | Refills: 0 | Status: SHIPPED | OUTPATIENT
Start: 2020-08-04 | End: 2020-12-17 | Stop reason: SDUPTHER

## 2020-08-04 RX ORDER — LISINOPRIL 5 MG/1
5 TABLET ORAL DAILY
Qty: 90 TABLET | Refills: 0 | Status: SHIPPED | OUTPATIENT
Start: 2020-08-04 | End: 2020-12-17 | Stop reason: SDUPTHER

## 2020-08-04 RX ORDER — MULTIVIT WITH MINERALS/LUTEIN
250 TABLET ORAL DAILY
Qty: 90 TABLET | Refills: 0 | Status: SHIPPED | OUTPATIENT
Start: 2020-08-04 | End: 2020-12-17 | Stop reason: SDUPTHER

## 2020-08-04 RX ORDER — FERROUS FUMARATE 324(106)MG
1 TABLET ORAL DAILY
Qty: 90 TABLET | Refills: 0 | Status: SHIPPED | OUTPATIENT
Start: 2020-08-04 | End: 2020-12-17 | Stop reason: SDUPTHER

## 2020-08-04 RX ORDER — LEVOTHYROXINE SODIUM 175 UG/1
175 TABLET ORAL
Qty: 90 TABLET | Refills: 0 | Status: SHIPPED | OUTPATIENT
Start: 2020-08-04 | End: 2020-12-17 | Stop reason: SDUPTHER

## 2020-08-04 NOTE — PROGRESS NOTES
Assessment/Plan:    BMI Counseling: Body mass index is 49 26 kg/m²  The BMI is above normal  Patient referred to weight management due to patient being severely obese  -TSH level is normal at this time  -I did review her labs from May in which her anemia has improved  -I did review the note from weight management  -I did encourage her to make the appointment with the sleep specialist  -apply moist heat to low back 3 times daily for 10 minutes as needed   -I did recommend a formal physical therapy program for her low back pain but she states that she does not have the time  She will continue the home exercise program that I did give her mom  She will let me know if she does have more time to go to a formal physical therapy program  -I did advise her that her lightheadedness may be due to her poor sleep  She has been advised to let me know if any symptoms increase   -continue follow-up with the weight management/bariatric says scheduled  -continue other medications as directed   -recommend follow-up in December M*Modal software was used to dictate this note  It may contain errors with dictating incorrect words/spelling  Please contact provider directly for any questions  Diagnoses and all orders for this visit:    Hypothyroidism following radioiodine therapy  -     levothyroxine 175 mcg tablet; Take 1 tablet (175 mcg total) by mouth daily in the early morning    Other specified hypothyroidism    Essential hypertension  -     lisinopril (ZESTRIL) 5 mg tablet; Take 1 tablet (5 mg total) by mouth daily  -     hydrochlorothiazide (HYDRODIURIL) 25 mg tablet; Take 1 tablet (25 mg total) by mouth daily    Iron deficiency anemia secondary to inadequate dietary iron intake  -     ascorbic acid (VITAMIN C) 250 mg tablet;  Take 1 tablet (250 mg total) by mouth daily  -     Ferrous Fumarate 324 (106 Fe) MG TABS; Take 1 tablet by mouth daily    Obesity, Class III, BMI 40-49 9 (morbid obesity) ([address] ) Subjective:      Patient ID: Estrella Baker is a 45 y o  female  Patient presents today for routine follow-up for hypothyroidism, hypertension, iron deficiency anemia  She states that she is compliant with her medications  She did have a recent TSH level and she would like the results  She states on occasion she does feel lightheaded  Last episode was this morning when she was taking a shower  On occasion the room spins but the room was not spinning this morning  She states that she has not been getting a lot of sleep at night  She states that she gets home from work around 6 30 but unfortunately does not fall asleep until around 4 in the morning  She did recently see weight management and she would like to proceed with bariatric surgery  She has been referred to a sleep specialist but she did not make an appointment yet  She does snore at night  She states that she does feel fatigued during the day at times  She also complains of mid low back pain over the past month  She denies any injury  She states that I did give her mother a home exercise program to do which she has been doing and at times does notice some benefit  She denies any numbness or tingling or radiation of pain down her lower extremities  The following portions of the patient's history were reviewed and updated as appropriate:   She  has a past medical history of Class 3 severe obesity due to excess calories with serious comorbidity and body mass index (BMI) of 45 0 to 49 9 in adult [facility]) (12/10/2018), Depression, Disease of thyroid gland, Edema of both legs (2012), Hypertension, Iron deficiency anemia secondary to inadequate dietary iron intake (1/2/2019), Macrocytosis (2012), and Vitamin D deficiency    She   Patient Active Problem List    Diagnosis Date Noted    Hypothyroidism following radioiodine therapy 08/04/2020    Obesity, Class III, BMI 40-49 9 (morbid obesity) ([facility] [address] ) 04/21/2020    BMI 45 0-49 9, adult [facility]) 03/02/2020    Cervical cancer screening 03/02/2020    Dysfunctional uterine bleeding 03/02/2020    Varicose veins of both legs with edema 03/02/2020    Encounter for screening for HIV 03/02/2020    Screening for hyperlipidemia 08/16/2019    Iron deficiency anemia secondary to inadequate dietary iron intake 01/02/2019    Hypokalemia 01/02/2019    Essential hypertension 12/10/2018    Vitamin D deficiency 12/10/2018     She  has a past surgical history that includes Thyroid surgery and Tubal ligation (Bilateral, 2009)  Her family history includes Diabetes in her family  She  reports that she has never smoked  She has never used smokeless tobacco  She reports current alcohol use  She reports that she does not use drugs  Current Outpatient Medications   Medication Sig Dispense Refill    ascorbic acid (VITAMIN C) 250 mg tablet Take 1 tablet (250 mg total) by mouth daily 90 tablet 0    Ferrous Fumarate 324 (106 Fe) MG TABS Take 1 tablet by mouth daily 90 tablet 0    hydrochlorothiazide (HYDRODIURIL) 25 mg tablet Take 1 tablet (25 mg total) by mouth daily 90 tablet 0    levothyroxine 175 mcg tablet Take 1 tablet (175 mcg total) by mouth daily in the early morning 90 tablet 0    lisinopril (ZESTRIL) 5 mg tablet Take 1 tablet (5 mg total) by mouth daily 90 tablet 0     No current facility-administered medications for this visit        Current Outpatient Medications on File Prior to Visit   Medication Sig    [DISCONTINUED] ascorbic acid (VITAMIN C) 250 mg tablet Take 1 tablet (250 mg total) by mouth daily    [DISCONTINUED] Ferrous Fumarate 324 (106 Fe) MG TABS Take 1 tablet by mouth daily    [DISCONTINUED] hydrochlorothiazide (HYDRODIURIL) 25 mg tablet Take 1 tablet (25 mg total) by mouth daily    [DISCONTINUED] levothyroxine 175 mcg tablet Take 1 tablet (175 mcg total) by mouth daily in the early morning    [DISCONTINUED] lisinopril (ZESTRIL) 5 mg tablet Take 1 tablet (5 mg total) by mouth daily     No current facility-administered medications on file prior to visit  She has No Known Allergies       Review of Systems   Constitutional:        As stated in HPI   Respiratory: Negative for cough and shortness of breath  Cardiovascular: Positive for leg swelling (She states that she does get leg swelling at times)  Negative for chest pain  Gastrointestinal: Negative for abdominal pain and blood in stool  Musculoskeletal:        As stated in HPI   Neurological:        As stated in HPI   Psychiatric/Behavioral:        Denies any depression or anxiety  Does not get sufficient sleep as stated in HPI         Objective:      /80 (BP Location: Left arm, Patient Position: Sitting, Cuff Size: Large)   Pulse 72   Temp 97 8 °F (36 6 °C) (Tympanic)   Resp 16   Ht 5' 6"   Wt (!) 138 kg (305 lb 3 2 oz)   SpO2 98%   BMI 49 26 kg/m²          Physical Exam   Constitutional: She appears well-developed  She does not appear ill  No distress  HENT:   Head: Normocephalic and atraumatic  Right Ear: Tympanic membrane, external ear and ear canal normal    Left Ear: Tympanic membrane, external ear and ear canal normal    Neck: Normal range of motion  Neck supple  No thyromegaly present  Cardiovascular: Normal rate, regular rhythm and normal heart sounds  Exam reveals no gallop and no friction rub  No murmur heard  Pulmonary/Chest: Effort normal and breath sounds normal  No respiratory distress  She has no wheezes  She has no rales  Abdominal: Soft  Bowel sounds are normal  She exhibits no mass  There is no abdominal tenderness  Musculoskeletal:         General: No swelling or deformity  Comments: Lumbar spine:  No tenderness, she does have reduced forward flexion  Lymphadenopathy:     She has no cervical adenopathy  Neurological: She is alert  Skin: Skin is warm     Psychiatric: Mood normal

## 2020-08-12 ENCOUNTER — OFFICE VISIT (OUTPATIENT)
Dept: SLEEP CENTER | Facility: CLINIC | Age: 40
End: 2020-08-12
Payer: COMMERCIAL

## 2020-08-12 VITALS
OXYGEN SATURATION: 98 % | HEIGHT: 66 IN | BODY MASS INDEX: 47.09 KG/M2 | DIASTOLIC BLOOD PRESSURE: 78 MMHG | HEART RATE: 85 BPM | WEIGHT: 293 LBS | SYSTOLIC BLOOD PRESSURE: 110 MMHG

## 2020-08-12 DIAGNOSIS — E03.8 OTHER SPECIFIED HYPOTHYROIDISM: ICD-10-CM

## 2020-08-12 DIAGNOSIS — G47.33 OBSTRUCTIVE SLEEP APNEA: Primary | ICD-10-CM

## 2020-08-12 DIAGNOSIS — G47.19 EXCESSIVE DAYTIME SLEEPINESS: ICD-10-CM

## 2020-08-12 DIAGNOSIS — I10 ESSENTIAL HYPERTENSION: ICD-10-CM

## 2020-08-12 DIAGNOSIS — E66.01 OBESITY, CLASS III, BMI 40-49.9 (MORBID OBESITY) (HCC): ICD-10-CM

## 2020-08-12 PROCEDURE — 3074F SYST BP LT 130 MM HG: CPT | Performed by: NURSE PRACTITIONER

## 2020-08-12 PROCEDURE — 3008F BODY MASS INDEX DOCD: CPT | Performed by: NURSE PRACTITIONER

## 2020-08-12 PROCEDURE — 99243 OFF/OP CNSLTJ NEW/EST LOW 30: CPT | Performed by: NURSE PRACTITIONER

## 2020-08-12 PROCEDURE — 3078F DIAST BP <80 MM HG: CPT | Performed by: NURSE PRACTITIONER

## 2020-08-12 PROCEDURE — 1036F TOBACCO NON-USER: CPT | Performed by: NURSE PRACTITIONER

## 2020-08-12 NOTE — PATIENT INSTRUCTIONS
1   Schedule home sleep study  2  Schedule set up of CPAP equipment  3  Schedule compliance visit 31-91 days after beginning use of equipment    Nursing Support:  When: Monday through Friday 7A-5PM except holidays  Where: Our direct line is [phone]  If you are having a true emergency please call 911  In the event that the line is busy or it is after hours please leave a voice message and we will return your call  Please speak clearly, leaving your full name, birth date, best number to reach you and the reason for your call  Medication refills: We will need the name of the medication, the dosage, the ordering provider, whether you get a 30 or 90 day refill, and the pharmacy name and address  Medications will be ordered by the provider only  Nurses cannot call in prescriptions  Please allow 7 days for medication refills  Physician requested updates: If your provider requested that you call with an update after starting medication, please be ready to provide us the medication and dosage, what time you take your medication, the time you attempt to fall asleep, time you fall asleep, when you wake up, and what time you get out of bed  Sleep Study Results: We will contact you with sleep study results and/or next steps after the physician has reviewed your testing

## 2020-08-12 NOTE — PROGRESS NOTES
Consultation - [address] 27, 1980, MRN: [id number]    8/12/2020        Reason for Consult / Principal Problem:  Evaluation of possible Obstructive Sleep Apnea  Morbid Obesity       Thank you for the opportunity of participating in the evaluation and care of this patient in the Sleep Clinic at [facility]  Subjective:     HPI: Manolo Hernandez is a 45y o  year old female  She presents for a consultation regarding possible obstructive sleep apnea  She is currently working with the bariatric specialists and plans to have bariatric surgery in the future  She has been identified as high risk for obstructive sleep apnea  She has concerns of loud snoring and coughing during sleep  She also experiences excessive daytime sleepiness  Her comorbid conditions include morbid obesity, hypertension and history of depression  She takes iron supplement with vitamin-C for iron deficiency anemia  Review of Systems      Genitourinary none   Cardiology ankle/leg swelling   Gastrointestinal abdominal pain or cramping that disturb sleep    Neurology awaken with headache   Constitutional none   Integumentary none   Psychiatry mood change   Musculoskeletal back pain   Pulmonary snoring   ENT none   Endocrine none   Hematological none       Employment:  He currently works full-time as a , working 3:00 p m  To 11:00 p m , Monday through Friday with some Saturdays    Sleep Schedule:       Bedtime:  Midnight      Latency:  Sometimes she initiate sleep in less than 10 minutes and other times she can lay in bed for 4 hours without falling asleep      Wakeup time:  Between 9:30 a m  And 10:00 a m  Awakenings:       Frequency:  1 time per night      Causes:   For urination      Duration:  She typically returns to sleep within a few minutes    Daytime Sleepiness / Inappropriate Sleep:       Most severe: 1: 00 p m  To 2:00 p m  Naps :  2 times per week      Time:  Between 1 and 2:00 p m  Duration:  20-30 minutes, sometimes naps are refreshing and sometimes she feels more tired after the nap       Inappropriate drowsiness / sleep:  She dozes easily when sedentary, such as when using her phone or watching television    Snoring:  She snores loudly with coughing noted    Apnea:  No reports of witnessed apnea    Change in Weight:  She has been gaining weight over the past several years    Restless Leg Syndrome:  No clinical symptoms consistent with this diagnosis     Other Complaints:  No reports of sleep walking, sleep talking, sleep paralysis or hallucinations surrounding sleep  No reports of bruxism  She awakens with dull headaches on most days  Social History:      Caffeine:  She has recently decreased soda intake to 1 per week       Tobacco:   reports that she has never smoked  She has never used smokeless tobacco      E-cig/Vaping:    E-Cigarette/Vaping    E-Cigarette Use Never User       E-Cigarette/Vaping Substances    Nicotine No     THC No     CBD No     Flavoring No     Other No     Unknown No          Alcohol:   reports current alcohol use  Occasional      Drugs:   reports no history of drug use  The review of systems and following portions of the patient's history were reviewed and updated as appropriate: allergies, current medications, past family history, past medical history, past social history, past surgical history and problem list         Objective:       Vitals:    08/12/20 1500   BP: 110/78   Pulse: 85   SpO2: 98%   Weight: 136 kg (300 lb)   Height: 5' 6" (1 676 m)     Body mass index is 48 42 kg/m²  Neck Circumference: 17  [place] Sleepiness Scale:  Total score: 10      Current Outpatient Medications:     ascorbic acid (VITAMIN C) 250 mg tablet, Take 1 tablet (250 mg total) by mouth daily, Disp: 90 tablet, Rfl: 0    Ferrous Fumarate 324 (106 Fe) MG TABS, Take 1 tablet by mouth daily, Disp: 90 tablet, Rfl: 0    hydrochlorothiazide (HYDRODIURIL) 25 mg tablet, Take 1 tablet (25 mg total) by mouth daily, Disp: 90 tablet, Rfl: 0    levothyroxine 175 mcg tablet, Take 1 tablet (175 mcg total) by mouth daily in the early morning, Disp: 90 tablet, Rfl: 0    lisinopril (ZESTRIL) 5 mg tablet, Take 1 tablet (5 mg total) by mouth daily, Disp: 90 tablet, Rfl: 0    Physical Exam  General Appearance:   Alert, cooperative, no distress, appears stated age, morbidly obese     Head:   Normocephalic, without obvious abnormality, atraumatic     Eyes:   PERRL, conjunctiva/corneas clear, EOM's intact          Nose:  Nares normal, septum midline, mucosa normal, no drainage or sinus tenderness           Throat:  Lips, teeth and gums normal; tongue normal size and  shape and midline in position; mucosa moist and redundant bilaterally, uvula normal, tonsils not visualized, Mallampati class 4       Neck:  Short and thick, supple, symmetrical, trachea midline, no adenopathy; Thyroid: No enlargement, tenderness or nodules; no carotid bruit or JVD     Lungs:      Clear to auscultation bilaterally, respirations unlabored     Heart:   Regular rate and rhythm, S1 and S2 normal, no murmur, rub or gallop       Extremities:  Extremities normal, atraumatic, no cyanosis or edema       Skin:  Skin color, texture, turgor normal, no rashes or lesions       Neurologic:  No focal deficits noted  Normal strength, sensation throughout     Sleep Study Results:  No prior sleep study      ASSESSMENT / PLAN     1  Obstructive sleep apnea  Home Study   2  Obesity, Class III, BMI 40-49 9 (morbid obesity) ([facility] [address] )  Ambulatory referral to Sleep Medicine   3  BMI 45 0-49 9, adult [facility])  Ambulatory referral to Sleep Medicine    Home Study   4  Essential hypertension  Ambulatory referral to Sleep Medicine    Home Study   5  Other specified hypothyroidism  Ambulatory referral to Sleep Medicine   6   Excessive daytime sleepiness  Home Study         Counseling / Coordination of Care  Total clinic time spent today 40 minutes  Greater than 50% of total time was spent with the patient and / or family counseling and / or coordination of care  A description of the counseling / coordination of care:     diagnostic results, instructions for management, risk factor reductions, prognosis, patient and family education, impressions, risks and benefits of treatment options and importance of compliance with treatment    Today we discussed the anatomy and physiology of the upper airway  I pointed out how changes in this region can result in both snoring and abnormal breathing events including apneas and hypopneas  I explained the most common co-morbidities of untreated sleep apnea  After this we talked about some forms of treatment including weight loss, application of positive airway pressure, mandibular advancement devices and surgery  In order to evaluate the possibility of Obstructive Sleep Apnea as a cause of the patient's symptoms, a home sleep study will be completed to identify the presence or absence of abnormal nocturnal breathing  If significant abnormal nocturnal breathing is detected, nasal CPAP will be titrated to find the optimum pressure needed to maintain upper airway patency during sleep  This may be accomplished using APAP or may require an in lab titration study  Following testing, the patient will return to the [address] for set up of auto Pap equipment, if needed, as well as compliance visit 31-91 days later     Otherwise, if sleep apnea is not identified, she will return to review results of her testing  The following instructions have been given to the patient today:    Patient Instructions   1  Schedule home sleep study  2  Schedule set up of CPAP equipment  3   Schedule compliance visit 31-91 days after beginning use of equipment    Nursing Support:  When: Monday through Friday 7A-5PM except holidays  Where: Our direct line is [phone]  If you are having a true emergency please call 911  In the event that the line is busy or it is after hours please leave a voice message and we will return your call  Please speak clearly, leaving your full name, birth date, best number to reach you and the reason for your call  Medication refills: We will need the name of the medication, the dosage, the ordering provider, whether you get a 30 or 90 day refill, and the pharmacy name and address  Medications will be ordered by the provider only  Nurses cannot call in prescriptions  Please allow 7 days for medication refills  Physician requested updates: If your provider requested that you call with an update after starting medication, please be ready to provide us the medication and dosage, what time you take your medication, the time you attempt to fall asleep, time you fall asleep, when you wake up, and what time you get out of bed  Sleep Study Results: We will contact you with sleep study results and/or next steps after the physician has reviewed your testing        Zeke Rebolledo, [address] [place] [address]

## 2020-08-30 ENCOUNTER — HOSPITAL ENCOUNTER (OUTPATIENT)
Dept: SLEEP CENTER | Facility: CLINIC | Age: 40
Discharge: HOME | End: 2020-08-30
Payer: COMMERCIAL

## 2020-08-30 DIAGNOSIS — I10 ESSENTIAL HYPERTENSION: ICD-10-CM

## 2020-08-30 DIAGNOSIS — G47.33 OBSTRUCTIVE SLEEP APNEA: ICD-10-CM

## 2020-08-30 DIAGNOSIS — G47.19 EXCESSIVE DAYTIME SLEEPINESS: ICD-10-CM

## 2020-08-30 PROCEDURE — G0399 HOME SLEEP TEST/TYPE 3 PORTA: HCPCS | Performed by: INTERNAL MEDICINE

## 2020-08-30 PROCEDURE — G0399 HOME SLEEP TEST/TYPE 3 PORTA: HCPCS

## 2020-08-31 NOTE — PROGRESS NOTES
Home Sleep Study Documentation    Pre-Sleep Home Study:    Set-up and instructions performed by: JOZEF Tomlin performed demonstration for Patient: yes    Return demonstration performed by Patient: yes    Written instructions provided to Patient: yes    Patient signed consent form: yes        Post-Sleep Home Study:    Additional comments by Patient: none    Home Sleep Study Failed:no:    Failure reason: N/A    Reported or Detected: N/A    Scored by: MAYRA Chang, RPSGT

## 2020-09-03 ENCOUNTER — TELEPHONE (OUTPATIENT)
Dept: OTHER | Facility: OTHER | Age: 40
End: 2020-09-03

## 2020-09-08 DIAGNOSIS — G47.33 OBSTRUCTIVE SLEEP APNEA: Primary | ICD-10-CM

## 2020-09-11 ENCOUNTER — TELEPHONE (OUTPATIENT)
Dept: SLEEP CENTER | Facility: CLINIC | Age: 40
End: 2020-09-11

## 2020-09-11 NOTE — TELEPHONE ENCOUNTER
Via #[id number]    Left message for thepatient to call back for sleep study results  Severe RICHARD  AUTOPAP ordered     Patient needs to schedule DME set up

## 2020-09-17 NOTE — TELEPHONE ENCOUNTER
Patient had appointment for DME set up 9/9/2020, spoke with Tyesha Vitale, states patient was not set up, but has appointment tomorrow 9/18/2020 at [facility] for set up      Patient already has compliance follow up scheduled 11/2/2020 with Rowena Favre

## 2020-11-02 ENCOUNTER — OFFICE VISIT (OUTPATIENT)
Dept: SLEEP CENTER | Facility: CLINIC | Age: 40
End: 2020-11-02
Payer: COMMERCIAL

## 2020-11-02 VITALS
HEIGHT: 66 IN | TEMPERATURE: 96.5 F | DIASTOLIC BLOOD PRESSURE: 76 MMHG | SYSTOLIC BLOOD PRESSURE: 110 MMHG | HEART RATE: 81 BPM | BODY MASS INDEX: 47.09 KG/M2 | OXYGEN SATURATION: 91 % | WEIGHT: 293 LBS

## 2020-11-02 DIAGNOSIS — G47.33 OBSTRUCTIVE SLEEP APNEA TREATED WITH CONTINUOUS POSITIVE AIRWAY PRESSURE (CPAP): Primary | ICD-10-CM

## 2020-11-02 DIAGNOSIS — Z99.89 OBSTRUCTIVE SLEEP APNEA TREATED WITH CONTINUOUS POSITIVE AIRWAY PRESSURE (CPAP): Primary | ICD-10-CM

## 2020-11-02 PROCEDURE — 1036F TOBACCO NON-USER: CPT | Performed by: NURSE PRACTITIONER

## 2020-11-02 PROCEDURE — 3078F DIAST BP <80 MM HG: CPT | Performed by: NURSE PRACTITIONER

## 2020-11-02 PROCEDURE — 3074F SYST BP LT 130 MM HG: CPT | Performed by: NURSE PRACTITIONER

## 2020-11-02 PROCEDURE — 3008F BODY MASS INDEX DOCD: CPT | Performed by: NURSE PRACTITIONER

## 2020-11-02 PROCEDURE — 99214 OFFICE O/P EST MOD 30 MIN: CPT | Performed by: NURSE PRACTITIONER

## 2020-11-03 ENCOUNTER — TELEPHONE (OUTPATIENT)
Dept: SLEEP CENTER | Facility: CLINIC | Age: 40
End: 2020-11-03

## 2020-11-11 ENCOUNTER — TELEPHONE (OUTPATIENT)
Dept: FAMILY MEDICINE CLINIC | Facility: CLINIC | Age: 40
End: 2020-11-11

## 2020-11-12 DIAGNOSIS — I10 ESSENTIAL HYPERTENSION: ICD-10-CM

## 2020-11-12 DIAGNOSIS — E87.6 HYPOKALEMIA: ICD-10-CM

## 2020-11-12 DIAGNOSIS — D50.8 IRON DEFICIENCY ANEMIA SECONDARY TO INADEQUATE DIETARY IRON INTAKE: ICD-10-CM

## 2020-11-12 DIAGNOSIS — E89.0 HYPOTHYROIDISM FOLLOWING RADIOIODINE THERAPY: Primary | ICD-10-CM

## 2020-11-12 DIAGNOSIS — Z13.220 LIPID SCREENING: ICD-10-CM

## 2020-11-12 PROBLEM — Z11.4 ENCOUNTER FOR SCREENING FOR HIV: Status: RESOLVED | Noted: 2020-03-02 | Resolved: 2020-11-12

## 2020-12-04 ENCOUNTER — TELEPHONE (OUTPATIENT)
Dept: FAMILY MEDICINE CLINIC | Facility: CLINIC | Age: 40
End: 2020-12-04

## 2020-12-04 DIAGNOSIS — U07.1 COVID-19: Primary | ICD-10-CM

## 2020-12-05 DIAGNOSIS — U07.1 COVID-19: ICD-10-CM

## 2020-12-05 PROCEDURE — U0003 INFECTIOUS AGENT DETECTION BY NUCLEIC ACID (DNA OR RNA); SEVERE ACUTE RESPIRATORY SYNDROME CORONAVIRUS 2 (SARS-COV-2) (CORONAVIRUS DISEASE [COVID-19]), AMPLIFIED PROBE TECHNIQUE, MAKING USE OF HIGH THROUGHPUT TECHNOLOGIES AS DESCRIBED BY CMS-2020-01-R: HCPCS | Performed by: FAMILY MEDICINE

## 2020-12-06 LAB — SARS-COV-2 RNA SPEC QL NAA+PROBE: NOT DETECTED

## 2020-12-14 ENCOUNTER — TELEPHONE (OUTPATIENT)
Dept: FAMILY MEDICINE CLINIC | Facility: CLINIC | Age: 40
End: 2020-12-14

## 2020-12-14 ENCOUNTER — LAB (OUTPATIENT)
Dept: LAB | Facility: HOSPITAL | Age: 40
End: 2020-12-14
Payer: COMMERCIAL

## 2020-12-14 DIAGNOSIS — Z13.220 LIPID SCREENING: ICD-10-CM

## 2020-12-14 DIAGNOSIS — E87.6 HYPOKALEMIA: ICD-10-CM

## 2020-12-14 DIAGNOSIS — D50.8 IRON DEFICIENCY ANEMIA SECONDARY TO INADEQUATE DIETARY IRON INTAKE: ICD-10-CM

## 2020-12-14 DIAGNOSIS — E89.0 HYPOTHYROIDISM FOLLOWING RADIOIODINE THERAPY: ICD-10-CM

## 2020-12-14 DIAGNOSIS — I10 ESSENTIAL HYPERTENSION: ICD-10-CM

## 2020-12-14 LAB
ALBUMIN SERPL BCP-MCNC: 3.9 G/DL (ref 3–5.2)
ALP SERPL-CCNC: 84 U/L (ref 43–122)
ALT SERPL W P-5'-P-CCNC: 10 U/L (ref 9–52)
ANION GAP SERPL CALCULATED.3IONS-SCNC: 7 MMOL/L (ref 5–14)
ANISOCYTOSIS BLD QL SMEAR: PRESENT
AST SERPL W P-5'-P-CCNC: 20 U/L (ref 14–36)
BASOPHILS # BLD AUTO: 0 THOUSANDS/ΜL (ref 0–0.1)
BASOPHILS NFR BLD AUTO: 1 % (ref 0–1)
BILIRUB SERPL-MCNC: 0.7 MG/DL
BUN SERPL-MCNC: 16 MG/DL (ref 5–25)
CALCIUM SERPL-MCNC: 8.9 MG/DL (ref 8.4–10.2)
CHLORIDE SERPL-SCNC: 103 MMOL/L (ref 97–108)
CHOLEST SERPL-MCNC: 178 MG/DL
CO2 SERPL-SCNC: 26 MMOL/L (ref 22–30)
CREAT SERPL-MCNC: 0.71 MG/DL (ref 0.6–1.2)
EOSINOPHIL # BLD AUTO: 0.2 THOUSAND/ΜL (ref 0–0.4)
EOSINOPHIL NFR BLD AUTO: 3 % (ref 0–6)
ERYTHROCYTE [DISTWIDTH] IN BLOOD BY AUTOMATED COUNT: 15.8 %
GFR SERPL CREATININE-BSD FRML MDRD: 108 ML/MIN/1.73SQ M
GLUCOSE P FAST SERPL-MCNC: 102 MG/DL (ref 70–99)
HCT VFR BLD AUTO: 37.9 % (ref 36–46)
HDLC SERPL-MCNC: 40 MG/DL
HGB BLD-MCNC: 11.9 G/DL (ref 12–16)
LDLC SERPL CALC-MCNC: 106 MG/DL
LYMPHOCYTES # BLD AUTO: 1.1 THOUSANDS/ΜL (ref 0.5–4)
LYMPHOCYTES NFR BLD AUTO: 18 % (ref 25–45)
MCH RBC QN AUTO: 24.7 PG (ref 26–34)
MCHC RBC AUTO-ENTMCNC: 31.4 G/DL (ref 31–36)
MCV RBC AUTO: 79 FL (ref 80–100)
MICROCYTES BLD QL AUTO: PRESENT
MONOCYTES # BLD AUTO: 0.4 THOUSAND/ΜL (ref 0.2–0.9)
MONOCYTES NFR BLD AUTO: 7 % (ref 1–10)
NEUTROPHILS # BLD AUTO: 4.4 THOUSANDS/ΜL (ref 1.8–7.8)
NEUTS SEG NFR BLD AUTO: 72 % (ref 45–65)
NONHDLC SERPL-MCNC: 138 MG/DL
PLATELET # BLD AUTO: 289 THOUSANDS/UL (ref 150–450)
PLATELET BLD QL SMEAR: ADEQUATE
PMV BLD AUTO: 9.3 FL (ref 8.9–12.7)
POTASSIUM SERPL-SCNC: 3.9 MMOL/L (ref 3.6–5)
PROT SERPL-MCNC: 7.5 G/DL (ref 5.9–8.4)
RBC # BLD AUTO: 4.8 MILLION/UL (ref 4–5.2)
RBC MORPH BLD: NORMAL
SODIUM SERPL-SCNC: 136 MMOL/L (ref 137–147)
TRIGL SERPL-MCNC: 162 MG/DL
TSH SERPL DL<=0.05 MIU/L-ACNC: 0.66 UIU/ML (ref 0.47–4.68)
WBC # BLD AUTO: 6.1 THOUSAND/UL (ref 4.5–11)

## 2020-12-14 PROCEDURE — 85025 COMPLETE CBC W/AUTO DIFF WBC: CPT | Performed by: PHYSICIAN ASSISTANT

## 2020-12-14 PROCEDURE — 36415 COLL VENOUS BLD VENIPUNCTURE: CPT | Performed by: PHYSICIAN ASSISTANT

## 2020-12-14 PROCEDURE — 80053 COMPREHEN METABOLIC PANEL: CPT | Performed by: PHYSICIAN ASSISTANT

## 2020-12-14 PROCEDURE — 84443 ASSAY THYROID STIM HORMONE: CPT

## 2020-12-14 PROCEDURE — 80061 LIPID PANEL: CPT | Performed by: PHYSICIAN ASSISTANT

## 2020-12-17 ENCOUNTER — TELEMEDICINE (OUTPATIENT)
Dept: FAMILY MEDICINE CLINIC | Facility: CLINIC | Age: 40
End: 2020-12-17
Payer: COMMERCIAL

## 2020-12-17 DIAGNOSIS — E89.0 HYPOTHYROIDISM FOLLOWING RADIOIODINE THERAPY: ICD-10-CM

## 2020-12-17 DIAGNOSIS — D50.8 IRON DEFICIENCY ANEMIA SECONDARY TO INADEQUATE DIETARY IRON INTAKE: ICD-10-CM

## 2020-12-17 DIAGNOSIS — I10 ESSENTIAL HYPERTENSION: ICD-10-CM

## 2020-12-17 DIAGNOSIS — R20.2 LEFT LEG PARESTHESIAS: Primary | ICD-10-CM

## 2020-12-17 PROCEDURE — 1036F TOBACCO NON-USER: CPT | Performed by: PHYSICIAN ASSISTANT

## 2020-12-17 PROCEDURE — 99214 OFFICE O/P EST MOD 30 MIN: CPT | Performed by: PHYSICIAN ASSISTANT

## 2020-12-17 RX ORDER — MULTIVIT WITH MINERALS/LUTEIN
250 TABLET ORAL DAILY
Qty: 90 TABLET | Refills: 0 | Status: SHIPPED | OUTPATIENT
Start: 2020-12-17 | End: 2021-04-12

## 2020-12-17 RX ORDER — FERROUS FUMARATE 324(106)MG
1 TABLET ORAL DAILY
Qty: 90 TABLET | Refills: 0 | Status: SHIPPED | OUTPATIENT
Start: 2020-12-17 | End: 2021-04-12

## 2020-12-17 RX ORDER — HYDROCHLOROTHIAZIDE 25 MG/1
25 TABLET ORAL DAILY
Qty: 90 TABLET | Refills: 0 | Status: SHIPPED | OUTPATIENT
Start: 2020-12-17 | End: 2021-04-12 | Stop reason: SDUPTHER

## 2020-12-17 RX ORDER — LEVOTHYROXINE SODIUM 175 UG/1
175 TABLET ORAL
Qty: 90 TABLET | Refills: 0 | Status: SHIPPED | OUTPATIENT
Start: 2020-12-17 | End: 2021-04-12 | Stop reason: SDUPTHER

## 2020-12-17 RX ORDER — LISINOPRIL 5 MG/1
5 TABLET ORAL DAILY
Qty: 90 TABLET | Refills: 0 | Status: SHIPPED | OUTPATIENT
Start: 2020-12-17 | End: 2021-04-12 | Stop reason: SDUPTHER

## 2021-02-26 ENCOUNTER — TELEPHONE (OUTPATIENT)
Dept: FAMILY MEDICINE CLINIC | Facility: CLINIC | Age: 41
End: 2021-02-26

## 2021-04-09 PROBLEM — E78.1 PRIMARY HYPERTRIGLYCERIDEMIA: Status: ACTIVE | Noted: 2021-04-09

## 2021-04-09 PROBLEM — Z13.220 LIPID SCREENING: Status: RESOLVED | Noted: 2020-11-12 | Resolved: 2021-04-09

## 2021-04-12 ENCOUNTER — OFFICE VISIT (OUTPATIENT)
Dept: FAMILY MEDICINE CLINIC | Facility: CLINIC | Age: 41
End: 2021-04-12
Payer: COMMERCIAL

## 2021-04-12 VITALS
BODY MASS INDEX: 47.09 KG/M2 | WEIGHT: 293 LBS | TEMPERATURE: 97.7 F | RESPIRATION RATE: 16 BRPM | HEART RATE: 72 BPM | SYSTOLIC BLOOD PRESSURE: 132 MMHG | OXYGEN SATURATION: 98 % | DIASTOLIC BLOOD PRESSURE: 88 MMHG | HEIGHT: 66 IN

## 2021-04-12 DIAGNOSIS — I10 ESSENTIAL HYPERTENSION: ICD-10-CM

## 2021-04-12 DIAGNOSIS — D50.8 IRON DEFICIENCY ANEMIA SECONDARY TO INADEQUATE DIETARY IRON INTAKE: ICD-10-CM

## 2021-04-12 DIAGNOSIS — E89.0 HYPOTHYROIDISM FOLLOWING RADIOIODINE THERAPY: ICD-10-CM

## 2021-04-12 DIAGNOSIS — E87.6 HYPOKALEMIA: ICD-10-CM

## 2021-04-12 DIAGNOSIS — E78.1 PRIMARY HYPERTRIGLYCERIDEMIA: Primary | ICD-10-CM

## 2021-04-12 PROBLEM — Z13.220 SCREENING FOR HYPERLIPIDEMIA: Status: RESOLVED | Noted: 2019-08-16 | Resolved: 2021-04-12

## 2021-04-12 PROCEDURE — 3725F SCREEN DEPRESSION PERFORMED: CPT | Performed by: PHYSICIAN ASSISTANT

## 2021-04-12 PROCEDURE — 99214 OFFICE O/P EST MOD 30 MIN: CPT | Performed by: PHYSICIAN ASSISTANT

## 2021-04-12 PROCEDURE — 3079F DIAST BP 80-89 MM HG: CPT | Performed by: PHYSICIAN ASSISTANT

## 2021-04-12 PROCEDURE — 3075F SYST BP GE 130 - 139MM HG: CPT | Performed by: PHYSICIAN ASSISTANT

## 2021-04-12 PROCEDURE — 1036F TOBACCO NON-USER: CPT | Performed by: PHYSICIAN ASSISTANT

## 2021-04-12 RX ORDER — LEVOTHYROXINE SODIUM 175 UG/1
175 TABLET ORAL
Qty: 90 TABLET | Refills: 1 | Status: SHIPPED | OUTPATIENT
Start: 2021-04-12 | End: 2021-11-01

## 2021-04-12 RX ORDER — HYDROCHLOROTHIAZIDE 25 MG/1
25 TABLET ORAL DAILY
Qty: 90 TABLET | Refills: 1 | Status: SHIPPED | OUTPATIENT
Start: 2021-04-12 | End: 2021-11-01 | Stop reason: SDUPTHER

## 2021-04-12 RX ORDER — LISINOPRIL 5 MG/1
5 TABLET ORAL DAILY
Qty: 90 TABLET | Refills: 1 | Status: SHIPPED | OUTPATIENT
Start: 2021-04-12 | End: 2021-11-01 | Stop reason: SDUPTHER

## 2021-04-12 RX ORDER — MULTIVITAMIN
1 CAPSULE ORAL DAILY
COMMUNITY

## 2021-04-12 NOTE — PROGRESS NOTES
Assessment/Plan:     - patient has been scheduled for the COVID-19 vaccine   - she has been advised that lab orders have been put in the system   -Continue the multivitamin for her anemia  - continue a low-fat low-cholesterol diet   - restart lisinopril  -continue other medications as advised   -recommend routine follow-up in September [employer] software was used to dictate this note  It may contain errors with dictating incorrect words/spelling  Please contact provider directly for any questions  BMI Counseling: Body mass index is 47 45 kg/m²  The BMI is above normal  Nutrition recommendations include reducing portion sizes and decreasing overall calorie intake  Exercise recommendations include exercising 3-5 times per week  Diagnoses and all orders for this visit:    Primary hypertriglyceridemia  -     CBC and differential  -     Comprehensive metabolic panel  -     Lipid panel  -     TSH, 3rd generation; Future    Essential hypertension  -     lisinopril (ZESTRIL) 5 mg tablet; Take 1 tablet (5 mg total) by mouth daily  -     hydrochlorothiazide (HYDRODIURIL) 25 mg tablet; Take 1 tablet (25 mg total) by mouth daily  -     CBC and differential  -     Comprehensive metabolic panel  -     Lipid panel  -     TSH, 3rd generation; Future    Hypothyroidism following radioiodine therapy  -     levothyroxine 175 mcg tablet; Take 1 tablet (175 mcg total) by mouth daily in the early morning  -     CBC and differential  -     Comprehensive metabolic panel  -     Lipid panel  -     TSH, 3rd generation; Future    Iron deficiency anemia secondary to inadequate dietary iron intake  -     CBC and differential  -     Comprehensive metabolic panel  -     Lipid panel  -     TSH, 3rd generation; Future    Hypokalemia  -     CBC and differential  -     Comprehensive metabolic panel  -     Lipid panel  -     TSH, 3rd generation; Future    Other orders  -     Multiple Vitamin (multivitamin) capsule;  Take 1 capsule by mouth daily          Subjective:      Patient ID: Sofía De Leon is a 45 y o  female  Patient presents today for follow-up of hypertension, hypothyroidism, anemia, hyperlipidemia  She has no concerns at this time  She states that she recently ran out of her blood pressure medication  She states that she is taking a multivitamin instead of the iron supplement and extra vitamin-C  She denies any chest pain, shortness of breath or swelling of her lower extremities  She is interested in getting the COVID-19 vaccine  The following portions of the patient's history were reviewed and updated as appropriate:   She  has a past medical history of Class 3 severe obesity due to excess calories with serious comorbidity and body mass index (BMI) of 45 0 to 49 9 in [facility]) (12/10/2018), Depression, Disease of thyroid gland, Edema of both legs (2012), Hypertension, Iron deficiency anemia secondary to inadequate dietary iron intake (1/2/2019), Macrocytosis (2012), and Vitamin D deficiency  She   Patient Active Problem List    Diagnosis Date Noted    Primary hypertriglyceridemia 04/09/2021    Left leg paresthesias 12/17/2020    Obstructive sleep apnea treated with continuous positive airway pressure (CPAP)     Hypothyroidism following radioiodine therapy 08/04/2020    Obesity, Class III, BMI 40-49 9 (morbid obesity) ([facility] [address] ) 04/21/2020    Cervical cancer screening 03/02/2020    Dysfunctional uterine bleeding 03/02/2020    Varicose veins of both legs with edema 03/02/2020    Iron deficiency anemia secondary to inadequate dietary iron intake 01/02/2019    Hypokalemia 01/02/2019    Essential hypertension 12/10/2018    Vitamin D deficiency 12/10/2018     She  has a past surgical history that includes Thyroid surgery and Tubal ligation (Bilateral, 2009)  Her family history includes Diabetes in her family  She  reports that she has never smoked   She has never used smokeless tobacco  She reports current alcohol use  She reports that she does not use drugs  Current Outpatient Medications   Medication Sig Dispense Refill    hydrochlorothiazide (HYDRODIURIL) 25 mg tablet Take 1 tablet (25 mg total) by mouth daily 90 tablet 1    levothyroxine 175 mcg tablet Take 1 tablet (175 mcg total) by mouth daily in the early morning 90 tablet 1    lisinopril (ZESTRIL) 5 mg tablet Take 1 tablet (5 mg total) by mouth daily 90 tablet 1    Multiple Vitamin (multivitamin) capsule Take 1 capsule by mouth daily       No current facility-administered medications for this visit  Current Outpatient Medications on File Prior to Visit   Medication Sig    Multiple Vitamin (multivitamin) capsule Take 1 capsule by mouth daily    [DISCONTINUED] ascorbic acid (VITAMIN C) 250 mg tablet Take 1 tablet (250 mg total) by mouth daily    [DISCONTINUED] Ferrous Fumarate 324 (106 Fe) MG TABS Take 1 tablet by mouth daily    [DISCONTINUED] hydrochlorothiazide (HYDRODIURIL) 25 mg tablet Take 1 tablet (25 mg total) by mouth daily    [DISCONTINUED] levothyroxine 175 mcg tablet Take 1 tablet (175 mcg total) by mouth daily in the early morning    [DISCONTINUED] lisinopril (ZESTRIL) 5 mg tablet Take 1 tablet (5 mg total) by mouth daily     No current facility-administered medications on file prior to visit  She has No Known Allergies       Review of Systems   Constitutional: Negative  Respiratory: Negative for cough and shortness of breath  Cardiovascular: Negative for chest pain and leg swelling  Objective:      /88 (BP Location: Left arm, Patient Position: Sitting, Cuff Size: Large)   Pulse 72   Temp 97 7 °F (36 5 °C) (Tympanic)   Resp 16   Ht 5' 6" (1 676 m)   Wt 133 kg (294 lb)   SpO2 98%   BMI 47 45 kg/m²          Physical Exam  Constitutional:       General: She is not in acute distress  Appearance: Normal appearance  She is well-developed  She is not ill-appearing, toxic-appearing or diaphoretic     HENT: Head: Normocephalic and atraumatic  Right Ear: Tympanic membrane, ear canal and external ear normal       Left Ear: Tympanic membrane, ear canal and external ear normal    Neck:      Musculoskeletal: Neck supple  Thyroid: No thyromegaly  Cardiovascular:      Rate and Rhythm: Normal rate and regular rhythm  Heart sounds: Normal heart sounds  No murmur  No friction rub  No gallop  Pulmonary:      Effort: Pulmonary effort is normal  No respiratory distress  Breath sounds: Normal breath sounds  No wheezing, rhonchi or rales  Abdominal:      General: Bowel sounds are normal       Palpations: Abdomen is soft  There is no mass  Tenderness: There is no abdominal tenderness  Musculoskeletal:         General: No deformity  Right lower leg: No edema  Left lower leg: No edema  Lymphadenopathy:      Cervical: No cervical adenopathy  Skin:     General: Skin is warm  Neurological:      General: No focal deficit present  Mental Status: She is alert  Psychiatric:         Mood and Affect: Mood normal          Behavior: Behavior normal          Thought Content:  Thought content normal          Judgment: Judgment normal

## 2021-04-20 ENCOUNTER — IMMUNIZATIONS (OUTPATIENT)
Dept: FAMILY MEDICINE CLINIC | Facility: HOSPITAL | Age: 41
End: 2021-04-20

## 2021-04-20 DIAGNOSIS — Z23 ENCOUNTER FOR IMMUNIZATION: Primary | ICD-10-CM

## 2021-04-20 PROCEDURE — 91301 SARS-COV-2 / COVID-19 MRNA VACCINE (MODERNA) 100 MCG: CPT

## 2021-04-20 PROCEDURE — 0011A SARS-COV-2 / COVID-19 MRNA VACCINE (MODERNA) 100 MCG: CPT

## 2021-05-03 ENCOUNTER — OFFICE VISIT (OUTPATIENT)
Dept: SLEEP CENTER | Facility: CLINIC | Age: 41
End: 2021-05-03
Payer: COMMERCIAL

## 2021-05-03 VITALS
SYSTOLIC BLOOD PRESSURE: 132 MMHG | HEART RATE: 83 BPM | WEIGHT: 292.32 LBS | DIASTOLIC BLOOD PRESSURE: 82 MMHG | BODY MASS INDEX: 46.98 KG/M2 | HEIGHT: 66 IN

## 2021-05-03 DIAGNOSIS — Z99.89 OBSTRUCTIVE SLEEP APNEA TREATED WITH CONTINUOUS POSITIVE AIRWAY PRESSURE (CPAP): Primary | ICD-10-CM

## 2021-05-03 DIAGNOSIS — G47.33 OBSTRUCTIVE SLEEP APNEA TREATED WITH CONTINUOUS POSITIVE AIRWAY PRESSURE (CPAP): Primary | ICD-10-CM

## 2021-05-03 PROCEDURE — 3008F BODY MASS INDEX DOCD: CPT | Performed by: PHYSICIAN ASSISTANT

## 2021-05-03 PROCEDURE — 99214 OFFICE O/P EST MOD 30 MIN: CPT | Performed by: NURSE PRACTITIONER

## 2021-05-03 NOTE — PATIENT INSTRUCTIONS
1   Continue use of CPAP equipment nightly  2  Continue to clean your equipment, as discussed  3  Contact the Sleep [address] [place] [address] with any questions or concerns prior to your next visit, as needed  4  Schedule visit for follow-up in 1 year      Nursing Support:  When: Monday through Friday 7A-5PM except holidays  Where: Our direct line is [phone]  If you are having a true emergency please call 911  In the event that the line is busy or it is after hours please leave a voice message and we will return your call  Please speak clearly, leaving your full name, birth date, best number to reach you and the reason for your call  Medication refills: We will need the name of the medication, the dosage, the ordering provider, whether you get a 30 or 90 day refill, and the pharmacy name and address  Medications will be ordered by the provider only  Nurses cannot call in prescriptions  Please allow 7 days for medication refills  Physician requested updates: If your provider requested that you call with an update after starting medication, please be ready to provide us the medication and dosage, what time you take your medication, the time you attempt to fall asleep, time you fall asleep, when you wake up, and what time you get out of bed  Sleep Study Results: We will contact you with sleep study results and/or next steps after the physician has reviewed your testing

## 2021-05-04 ENCOUNTER — TELEPHONE (OUTPATIENT)
Dept: SLEEP CENTER | Facility: CLINIC | Age: 41
End: 2021-05-04

## 2021-05-07 ENCOUNTER — APPOINTMENT (OUTPATIENT)
Dept: LAB | Facility: HOSPITAL | Age: 41
End: 2021-05-07
Payer: COMMERCIAL

## 2021-05-07 ENCOUNTER — TELEPHONE (OUTPATIENT)
Dept: FAMILY MEDICINE CLINIC | Facility: CLINIC | Age: 41
End: 2021-05-07

## 2021-05-07 DIAGNOSIS — E87.6 HYPOKALEMIA: ICD-10-CM

## 2021-05-07 DIAGNOSIS — I10 ESSENTIAL HYPERTENSION: ICD-10-CM

## 2021-05-07 DIAGNOSIS — D50.8 IRON DEFICIENCY ANEMIA SECONDARY TO INADEQUATE DIETARY IRON INTAKE: ICD-10-CM

## 2021-05-07 DIAGNOSIS — E89.0 HYPOTHYROIDISM FOLLOWING RADIOIODINE THERAPY: ICD-10-CM

## 2021-05-07 DIAGNOSIS — E78.1 PRIMARY HYPERTRIGLYCERIDEMIA: ICD-10-CM

## 2021-05-07 LAB
ALBUMIN SERPL BCP-MCNC: 3.9 G/DL (ref 3–5.2)
ALP SERPL-CCNC: 88 U/L (ref 43–122)
ALT SERPL W P-5'-P-CCNC: 14 U/L
ANION GAP SERPL CALCULATED.3IONS-SCNC: 4 MMOL/L (ref 5–14)
ANISOCYTOSIS BLD QL SMEAR: PRESENT
AST SERPL W P-5'-P-CCNC: 25 U/L (ref 14–36)
BASOPHILS # BLD AUTO: 0 THOUSANDS/ΜL (ref 0–0.1)
BASOPHILS NFR BLD AUTO: 1 % (ref 0–1)
BILIRUB SERPL-MCNC: 0.64 MG/DL
BUN SERPL-MCNC: 12 MG/DL (ref 5–25)
CALCIUM SERPL-MCNC: 9 MG/DL (ref 8.4–10.2)
CHLORIDE SERPL-SCNC: 102 MMOL/L (ref 97–108)
CHOLEST SERPL-MCNC: 197 MG/DL
CO2 SERPL-SCNC: 30 MMOL/L (ref 22–30)
CREAT SERPL-MCNC: 0.66 MG/DL (ref 0.6–1.2)
EOSINOPHIL # BLD AUTO: 0.1 THOUSAND/ΜL (ref 0–0.4)
EOSINOPHIL NFR BLD AUTO: 3 % (ref 0–6)
ERYTHROCYTE [DISTWIDTH] IN BLOOD BY AUTOMATED COUNT: 17.2 %
GFR SERPL CREATININE-BSD FRML MDRD: 111 ML/MIN/1.73SQ M
GLUCOSE P FAST SERPL-MCNC: 84 MG/DL (ref 70–99)
HCT VFR BLD AUTO: 36.7 % (ref 36–46)
HDLC SERPL-MCNC: 40 MG/DL
HGB BLD-MCNC: 11.6 G/DL (ref 12–16)
LDLC SERPL CALC-MCNC: 132 MG/DL
LYMPHOCYTES # BLD AUTO: 1.1 THOUSANDS/ΜL (ref 0.5–4)
LYMPHOCYTES NFR BLD AUTO: 24 % (ref 25–45)
MCH RBC QN AUTO: 24.9 PG (ref 26–34)
MCHC RBC AUTO-ENTMCNC: 31.8 G/DL (ref 31–36)
MCV RBC AUTO: 78 FL (ref 80–100)
MICROCYTES BLD QL AUTO: PRESENT
MONOCYTES # BLD AUTO: 0.3 THOUSAND/ΜL (ref 0.2–0.9)
MONOCYTES NFR BLD AUTO: 8 % (ref 1–10)
NEUTROPHILS # BLD AUTO: 2.8 THOUSANDS/ΜL (ref 1.8–7.8)
NEUTS SEG NFR BLD AUTO: 64 % (ref 45–65)
NONHDLC SERPL-MCNC: 157 MG/DL
PLATELET # BLD AUTO: 283 THOUSANDS/UL (ref 150–450)
PLATELET BLD QL SMEAR: ADEQUATE
PMV BLD AUTO: 9.2 FL (ref 8.9–12.7)
POTASSIUM SERPL-SCNC: 3.5 MMOL/L (ref 3.6–5)
PROT SERPL-MCNC: 7.4 G/DL (ref 5.9–8.4)
RBC # BLD AUTO: 4.69 MILLION/UL (ref 4–5.2)
RBC MORPH BLD: NORMAL
SODIUM SERPL-SCNC: 136 MMOL/L (ref 137–147)
TRIGL SERPL-MCNC: 127 MG/DL
TSH SERPL DL<=0.05 MIU/L-ACNC: 2.74 UIU/ML (ref 0.47–4.68)
WBC # BLD AUTO: 4.4 THOUSAND/UL (ref 4.5–11)

## 2021-05-07 PROCEDURE — 36415 COLL VENOUS BLD VENIPUNCTURE: CPT | Performed by: PHYSICIAN ASSISTANT

## 2021-05-07 PROCEDURE — 85025 COMPLETE CBC W/AUTO DIFF WBC: CPT | Performed by: PHYSICIAN ASSISTANT

## 2021-05-07 PROCEDURE — 80053 COMPREHEN METABOLIC PANEL: CPT | Performed by: PHYSICIAN ASSISTANT

## 2021-05-07 PROCEDURE — 84443 ASSAY THYROID STIM HORMONE: CPT

## 2021-05-07 PROCEDURE — 80061 LIPID PANEL: CPT | Performed by: PHYSICIAN ASSISTANT

## 2021-05-07 NOTE — TELEPHONE ENCOUNTER
----- Message from Lolis Benton PA-C sent at 5/7/2021 12:30 PM EDT -----  Please let her know that her potassium level has decreased from 3 9 down to 3 5  I would recommend a potassium rich food daily  I do have a diet here if she would like it  Blood work is normal for thyroid, kidneys, liver, blood sugar  Her cholesterol level has increased from 178 up to 197, normal is below 200  Recommend a low-cholesterol diet  I also have a diet here for cholesterol with foods to choose in foods to decrease  Her triglycerides have decreased from 162 down to 127, normal is below 150  Continue a low-fat diet and exercise  Follow-up as scheduled in July

## 2021-05-19 ENCOUNTER — IMMUNIZATIONS (OUTPATIENT)
Dept: FAMILY MEDICINE CLINIC | Facility: HOSPITAL | Age: 41
End: 2021-05-19

## 2021-05-19 DIAGNOSIS — Z23 ENCOUNTER FOR IMMUNIZATION: Primary | ICD-10-CM

## 2021-05-19 PROCEDURE — 91301 SARS-COV-2 / COVID-19 MRNA VACCINE (MODERNA) 100 MCG: CPT

## 2021-05-19 PROCEDURE — 0012A SARS-COV-2 / COVID-19 MRNA VACCINE (MODERNA) 100 MCG: CPT

## 2021-07-13 ENCOUNTER — RA CDI HCC (OUTPATIENT)
Dept: OTHER | Facility: HOSPITAL | Age: 41
End: 2021-07-13

## 2021-07-13 NOTE — PROGRESS NOTES
[facility] [address]  coding opportunities             Chart reviewed, (number of) suggestions sent to provider: 1            Number of suggestions actually used: 1         Patients insurance company: [employer] (Medicare Advantage and Commercial)     Visit status: Patient arrived for their scheduled appointment     Provider never responded to [employer] [employer]  coding request     [facility] [address]  coding opportunities             Chart reviewed, (number of) suggestions sent to provider: 1                  Patients insurance company: [employer] [address] [place] [address] (Medicare Advantage and Commercial)           E66 01: Morbid (severe) obesity due to excess calories ([facility] [address] ) - please review if this is correct and assess and document if applicable  Per CMS/ICD 10 coding guidelines, to be used when BMI > 40

## 2021-07-18 ENCOUNTER — HOSPITAL ENCOUNTER (EMERGENCY)
Facility: HOSPITAL | Age: 41
Discharge: HOME | End: 2021-07-18
Attending: EMERGENCY MEDICINE | Admitting: EMERGENCY MEDICINE
Payer: COMMERCIAL

## 2021-07-18 VITALS
TEMPERATURE: 98.1 F | OXYGEN SATURATION: 99 % | WEIGHT: 290 LBS | HEART RATE: 80 BPM | SYSTOLIC BLOOD PRESSURE: 164 MMHG | RESPIRATION RATE: 18 BRPM | BODY MASS INDEX: 46.81 KG/M2 | DIASTOLIC BLOOD PRESSURE: 89 MMHG

## 2021-07-18 DIAGNOSIS — H66.90 OTITIS MEDIA: Primary | ICD-10-CM

## 2021-07-18 PROCEDURE — 99282 EMERGENCY DEPT VISIT SF MDM: CPT

## 2021-07-18 PROCEDURE — 99284 EMERGENCY DEPT VISIT MOD MDM: CPT | Performed by: EMERGENCY MEDICINE

## 2021-07-18 RX ORDER — IBUPROFEN 600 MG/1
600 TABLET ORAL EVERY 6 HOURS PRN
Qty: 20 TABLET | Refills: 0 | Status: SHIPPED | OUTPATIENT
Start: 2021-07-18 | End: 2022-03-08

## 2021-07-18 RX ORDER — AMOXICILLIN 500 MG/1
500 CAPSULE ORAL 3 TIMES DAILY
Qty: 30 CAPSULE | Refills: 0 | Status: SHIPPED | OUTPATIENT
Start: 2021-07-18 | End: 2021-07-28

## 2021-07-18 NOTE — ED PROVIDER NOTES
History  Chief Complaint   Patient presents with    Ear Problem     Reports decreased hearing to right ear starting yesterday  Today, woke up with pain to ear  No OTC medications  C/o right ear pain since yesterday  No fevers, no nausea/vomiting    No congestion, no cough          Prior to Admission Medications   Prescriptions Last Dose Informant Patient Reported? Taking? Multiple Vitamin (multivitamin) capsule   Yes No   Sig: Take 1 capsule by mouth daily   hydrochlorothiazide (HYDRODIURIL) 25 mg tablet   No No   Sig: Take 1 tablet (25 mg total) by mouth daily   levothyroxine 175 mcg tablet   No No   Sig: Take 1 tablet (175 mcg total) by mouth daily in the early morning   lisinopril (ZESTRIL) 5 mg tablet   No No   Sig: Take 1 tablet (5 mg total) by mouth daily      Facility-Administered Medications: None       Past Medical History:   Diagnosis Date    Class 3 severe obesity due to excess calories with serious comorbidity and body mass index (BMI) of 45 0 to 49 9 in [facility]) 12/10/2018    Depression     Disease of thyroid gland     Edema of both legs 2012    Hypertension     Iron deficiency anemia secondary to inadequate dietary iron intake 1/2/2019    Macrocytosis 2012    Vitamin D deficiency        Past Surgical History:   Procedure Laterality Date    THYROID SURGERY      ablation    TUBAL LIGATION Bilateral 2009       Family History   Problem Relation Age of Onset    Diabetes Family      I have reviewed and agree with the history as documented  E-Cigarette/Vaping    E-Cigarette Use Never User      E-Cigarette/Vaping Substances    Nicotine No     THC No     CBD No     Flavoring No     Other No     Unknown No      Social History     Tobacco Use    Smoking status: Never Smoker    Smokeless tobacco: Never Used   Vaping Use    Vaping Use: Never used   Substance Use Topics    Alcohol use:  Yes    Drug use: No       Review of Systems   Constitutional: Negative for appetite change, fatigue and fever  HENT: Positive for ear pain  Negative for ear discharge, rhinorrhea and sore throat  Eyes: Negative for pain  Respiratory: Negative for cough, shortness of breath and wheezing  Cardiovascular: Negative for chest pain and leg swelling  Gastrointestinal: Negative for abdominal pain, diarrhea and vomiting  Genitourinary: Negative for dysuria and flank pain  Musculoskeletal: Negative for back pain and neck pain  Skin: Negative for rash  Neurological: Negative for syncope and headaches  Psychiatric/Behavioral:        Mood normal       Physical Exam  Physical Exam  Vitals and nursing note reviewed  Constitutional:       Appearance: She is well-developed  HENT:      Head: Normocephalic and atraumatic  Comments: Right ear TM is reddened and the TM is bulging     Right Ear: Ear canal normal       Left Ear: Tympanic membrane and ear canal normal    Cardiovascular:      Rate and Rhythm: Normal rate and regular rhythm  Pulmonary:      Effort: Pulmonary effort is normal       Breath sounds: Normal breath sounds  Abdominal:      Palpations: Abdomen is soft  Tenderness: There is no abdominal tenderness  Musculoskeletal:         General: Normal range of motion  Cervical back: Normal range of motion and neck supple  Skin:     General: Skin is warm and dry  Neurological:      Mental Status: She is alert and oriented to person, place, and time           Vital Signs  ED Triage Vitals   Temperature Pulse Respirations Blood Pressure SpO2   07/18/21 0946 07/18/21 0945 07/18/21 0945 07/18/21 0945 07/18/21 0945   98 1 °F (36 7 °C) 80 18 164/89 99 %      Temp Source Heart Rate Source Patient Position - Orthostatic VS BP Location FiO2 (%)   07/18/21 0946 07/18/21 0945 07/18/21 0945 07/18/21 0945 --   Oral Monitor Sitting Right arm       Pain Score       07/18/21 0945       Worst Possible Pain           Vitals:    07/18/21 0945   BP: 164/89   Pulse: 80   Patient Position - Orthostatic VS: Sitting         Visual Acuity      ED Medications  Medications - No data to display    Diagnostic Studies  Results Reviewed     None                 No orders to display              Procedures  Procedures         ED Course                                           MDM    Disposition  Final diagnoses:   Otitis media     Time reflects when diagnosis was documented in both MDM as applicable and the Disposition within this note     Time User Action Codes Description Comment    7/18/2021 10:10 AM Jamaal Ariza Conner [H66 [id number]] Otitis media       ED Disposition     ED Disposition Condition Date/Time Comment    Discharge Stable Sun Jul 18, 2021 10:10 AM Miguelito Butt discharge to home/self care              Follow-up Information     Follow up With Specialties Details Why Contact Info    Flori Jung PA-C Family Medicine, Physician Assistant   [id number] [facility]  [phone]            Discharge Medication List as of 7/18/2021 10:14 AM      START taking these medications    Details   amoxicillin (AMOXIL) 500 mg capsule Take 1 capsule (500 mg total) by mouth 3 (three) times a day for 10 days, Starting Sun 7/18/2021, Until Wed 7/28/2021, Normal      ibuprofen (MOTRIN) 600 mg tablet Take 1 tablet (600 mg total) by mouth every 6 (six) hours as needed for moderate pain, Starting Sun 7/18/2021, Normal         CONTINUE these medications which have NOT CHANGED    Details   hydrochlorothiazide (HYDRODIURIL) 25 mg tablet Take 1 tablet (25 mg total) by mouth daily, Starting Mon 4/12/2021, Normal      levothyroxine 175 mcg tablet Take 1 tablet (175 mcg total) by mouth daily in the early morning, Starting Mon 4/12/2021, Normal      lisinopril (ZESTRIL) 5 mg tablet Take 1 tablet (5 mg total) by mouth daily, Starting Mon 4/12/2021, Normal      Multiple Vitamin (multivitamin) capsule Take 1 capsule by mouth daily, Historical Med           No discharge procedures on file     PDMP Review     None          ED Provider  Electronically Signed by           Chas Thompson MD  07/18/21 5526

## 2021-07-19 ENCOUNTER — LAB (OUTPATIENT)
Dept: LAB | Facility: IMAGING CENTER | Age: 41
End: 2021-07-19
Payer: COMMERCIAL

## 2021-07-19 ENCOUNTER — OFFICE VISIT (OUTPATIENT)
Dept: FAMILY MEDICINE CLINIC | Facility: CLINIC | Age: 41
End: 2021-07-19
Payer: COMMERCIAL

## 2021-07-19 VITALS
HEIGHT: 66 IN | TEMPERATURE: 97.6 F | WEIGHT: 290 LBS | OXYGEN SATURATION: 98 % | BODY MASS INDEX: 46.61 KG/M2 | HEART RATE: 86 BPM | SYSTOLIC BLOOD PRESSURE: 138 MMHG | RESPIRATION RATE: 16 BRPM | DIASTOLIC BLOOD PRESSURE: 84 MMHG

## 2021-07-19 DIAGNOSIS — I10 ESSENTIAL HYPERTENSION: ICD-10-CM

## 2021-07-19 DIAGNOSIS — E87.6 HYPOKALEMIA: ICD-10-CM

## 2021-07-19 DIAGNOSIS — E89.0 HYPOTHYROIDISM FOLLOWING RADIOIODINE THERAPY: ICD-10-CM

## 2021-07-19 DIAGNOSIS — H65.01 NON-RECURRENT ACUTE SEROUS OTITIS MEDIA OF RIGHT EAR: ICD-10-CM

## 2021-07-19 DIAGNOSIS — Z12.31 SCREENING MAMMOGRAM, ENCOUNTER FOR: Primary | ICD-10-CM

## 2021-07-19 DIAGNOSIS — Z99.89 OBSTRUCTIVE SLEEP APNEA TREATED WITH CONTINUOUS POSITIVE AIRWAY PRESSURE (CPAP): ICD-10-CM

## 2021-07-19 DIAGNOSIS — J06.9 ACUTE UPPER RESPIRATORY INFECTION: ICD-10-CM

## 2021-07-19 DIAGNOSIS — D50.8 IRON DEFICIENCY ANEMIA SECONDARY TO INADEQUATE DIETARY IRON INTAKE: ICD-10-CM

## 2021-07-19 DIAGNOSIS — G47.33 OBSTRUCTIVE SLEEP APNEA TREATED WITH CONTINUOUS POSITIVE AIRWAY PRESSURE (CPAP): ICD-10-CM

## 2021-07-19 DIAGNOSIS — E66.01 OBESITY, CLASS III, BMI 40-49.9 (MORBID OBESITY) (HCC): ICD-10-CM

## 2021-07-19 DIAGNOSIS — E78.1 PRIMARY HYPERTRIGLYCERIDEMIA: ICD-10-CM

## 2021-07-19 LAB
ANION GAP SERPL CALCULATED.3IONS-SCNC: 4 MMOL/L (ref 4–13)
BUN SERPL-MCNC: 14 MG/DL (ref 5–25)
CALCIUM SERPL-MCNC: 9.1 MG/DL (ref 8.3–10.1)
CHLORIDE SERPL-SCNC: 106 MMOL/L (ref 100–108)
CO2 SERPL-SCNC: 26 MMOL/L (ref 21–32)
CREAT SERPL-MCNC: 0.61 MG/DL (ref 0.6–1.3)
GFR SERPL CREATININE-BSD FRML MDRD: 114 ML/MIN/1.73SQ M
GLUCOSE P FAST SERPL-MCNC: 86 MG/DL (ref 65–99)
POTASSIUM SERPL-SCNC: 3.7 MMOL/L (ref 3.5–5.3)
SODIUM SERPL-SCNC: 136 MMOL/L (ref 136–145)

## 2021-07-19 PROCEDURE — 1036F TOBACCO NON-USER: CPT | Performed by: PHYSICIAN ASSISTANT

## 2021-07-19 PROCEDURE — 3008F BODY MASS INDEX DOCD: CPT | Performed by: PHYSICIAN ASSISTANT

## 2021-07-19 PROCEDURE — 80048 BASIC METABOLIC PNL TOTAL CA: CPT

## 2021-07-19 PROCEDURE — 99214 OFFICE O/P EST MOD 30 MIN: CPT | Performed by: PHYSICIAN ASSISTANT

## 2021-07-19 PROCEDURE — 36415 COLL VENOUS BLD VENIPUNCTURE: CPT

## 2021-07-19 PROCEDURE — 3075F SYST BP GE 130 - 139MM HG: CPT | Performed by: PHYSICIAN ASSISTANT

## 2021-07-19 PROCEDURE — 3079F DIAST BP 80-89 MM HG: CPT | Performed by: PHYSICIAN ASSISTANT

## 2021-07-19 NOTE — PROGRESS NOTES
Assessment/Plan:     lab results from may have been reviewed   - I did recommend a multivitamin daily which she states that she does forget  - continue medications as advised otherwise   - she has been advised to reach out to the sleep specialist regarding the mask that she has difficulty utilizing with her CPAP machine  She is aware of the possible cardiac affects on her heart if the sleep apnea is not controlled   -ER note from yesterday has been reviewed  She has been advised to continue the amoxicillin ibuprofen  - I did give her a diet with potassium rich foods to eat daily   - she will proceed with a BMP today  - routine follow-up in November although I did advise her to follow-up if there is no improvement with her ear over the next 2 days  She will need labs prior to her follow-up in November but I did not put the orders in the system since she is having a BMP done today    M*Modal software was used to dictate this note  It may contain errors with dictating incorrect words/spelling  Please contact provider directly for any questions  BMI Counseling: Body mass index is 46 81 kg/m²  The BMI is above normal  Nutrition recommendations include reducing portion sizes and decreasing overall calorie intake  Diagnoses and all orders for this visit:    Screening mammogram, encounter for  -     Mammo screening bilateral w 3d & cad; Future    Obesity, Class III, BMI 40-49 9 (morbid obesity) ([facility] [address] )    Hypothyroidism following radioiodine therapy    Essential hypertension  -     Basic metabolic panel; Future    Obstructive sleep apnea treated with continuous positive airway pressure (CPAP)    Primary hypertriglyceridemia    Iron deficiency anemia secondary to inadequate dietary iron intake    Hypokalemia  -     Basic metabolic panel; Future    Non-recurrent acute serous otitis media of right ear    Acute upper respiratory infection          Subjective:      Patient ID: Lottie De Leon is a 37 y o  female  Patient presents today for routine follow-up for hypertension, hypothyroidism, sleep apnea, hyperlipidemia  She states that she still not able to utilize the new mask for her CPAP  She did not reach out to the sleep specialist   She is aware that uncontrolled sleep apnea can affect her heart  She denies any chest pain, shortness of breath or swelling of her lower extremities  She states over the past 3 days she did develop an upper respiratory infection and right ear pain  She was seen in emergency room yesterday for her symptoms and was placed on amoxicillin and ibuprofen for right ear infection  She is also taking Tylenol cold and Sinus with some improvement  She did notice mild watery discharge  From her right ear  Denies any fever, chills  She does have a mild cough  The following portions of the patient's history were reviewed and updated as appropriate:   She  has a past medical history of Class 3 severe obesity due to excess calories with serious comorbidity and body mass index (BMI) of 45 0 to 49 9 in [facility]) (12/10/2018), Depression, Disease of thyroid gland, Edema of both legs (2012), Hypertension, Iron deficiency anemia secondary to inadequate dietary iron intake (1/2/2019), Macrocytosis (2012), and Vitamin D deficiency    She   Patient Active Problem List    Diagnosis Date Noted    Non-recurrent acute serous otitis media of right ear 07/19/2021    Acute upper respiratory infection 07/19/2021    Primary hypertriglyceridemia 04/09/2021    Left leg paresthesias 12/17/2020    Obstructive sleep apnea treated with continuous positive airway pressure (CPAP)     Hypothyroidism following radioiodine therapy 08/04/2020    Obesity, Class III, BMI 40-49 9 (morbid obesity) ([facility] [address] ) 04/21/2020    Cervical cancer screening 03/02/2020    Dysfunctional uterine bleeding 03/02/2020    Varicose veins of both legs with edema 03/02/2020    Iron deficiency anemia secondary to inadequate dietary iron intake 01/02/2019    Hypokalemia 01/02/2019    Essential hypertension 12/10/2018    Vitamin D deficiency 12/10/2018     She  has a past surgical history that includes Thyroid surgery and Tubal ligation (Bilateral, 2009)  Her family history includes Diabetes in her family  She  reports that she has never smoked  She has never used smokeless tobacco  She reports current alcohol use  She reports that she does not use drugs  Current Outpatient Medications   Medication Sig Dispense Refill    amoxicillin (AMOXIL) 500 mg capsule Take 1 capsule (500 mg total) by mouth 3 (three) times a day for 10 days 30 capsule 0    hydrochlorothiazide (HYDRODIURIL) 25 mg tablet Take 1 tablet (25 mg total) by mouth daily 90 tablet 1    ibuprofen (MOTRIN) 600 mg tablet Take 1 tablet (600 mg total) by mouth every 6 (six) hours as needed for moderate pain 20 tablet 0    levothyroxine 175 mcg tablet Take 1 tablet (175 mcg total) by mouth daily in the early morning 90 tablet 1    lisinopril (ZESTRIL) 5 mg tablet Take 1 tablet (5 mg total) by mouth daily 90 tablet 1    Multiple Vitamin (multivitamin) capsule Take 1 capsule by mouth daily       No current facility-administered medications for this visit  Current Outpatient Medications on File Prior to Visit   Medication Sig    amoxicillin (AMOXIL) 500 mg capsule Take 1 capsule (500 mg total) by mouth 3 (three) times a day for 10 days    hydrochlorothiazide (HYDRODIURIL) 25 mg tablet Take 1 tablet (25 mg total) by mouth daily    ibuprofen (MOTRIN) 600 mg tablet Take 1 tablet (600 mg total) by mouth every 6 (six) hours as needed for moderate pain    levothyroxine 175 mcg tablet Take 1 tablet (175 mcg total) by mouth daily in the early morning    lisinopril (ZESTRIL) 5 mg tablet Take 1 tablet (5 mg total) by mouth daily    Multiple Vitamin (multivitamin) capsule Take 1 capsule by mouth daily     No current facility-administered medications on file prior to visit  She has No Known Allergies       Review of Systems   Constitutional: Negative  HENT: Positive for congestion, ear discharge, ear pain, postnasal drip and rhinorrhea  As stated in HPI   Respiratory: Positive for cough  Negative for shortness of breath  Cardiovascular: Negative for chest pain and leg swelling  Gastrointestinal: Negative for abdominal pain  Objective:      /84 (BP Location: Left arm, Patient Position: Sitting, Cuff Size: Large)   Pulse 86   Temp 97 6 °F (36 4 °C) (Tympanic)   Resp 16   Ht 5' 6" (1 676 m)   Wt 132 kg (290 lb)   LMP 06/30/2021   SpO2 98%   BMI 46 81 kg/m²          Physical Exam  Constitutional:       General: She is not in acute distress  Appearance: Normal appearance  She is well-developed  She is not ill-appearing, toxic-appearing or diaphoretic  HENT:      Head: Normocephalic and atraumatic  Left Ear: Tympanic membrane, ear canal and external ear normal       Ears:      Comments:  She does have erythema of the right tympanic membrane  Could not appreciate any rupture of the membrane at this time  Neck:      Thyroid: No thyromegaly  Cardiovascular:      Rate and Rhythm: Normal rate and regular rhythm  Heart sounds: Normal heart sounds  No murmur heard  No friction rub  No gallop  Pulmonary:      Effort: Pulmonary effort is normal  No respiratory distress  Breath sounds: Normal breath sounds  No wheezing, rhonchi or rales  Abdominal:      General: Bowel sounds are normal       Palpations: Abdomen is soft  There is no mass  Tenderness: There is no abdominal tenderness  Musculoskeletal:         General: No deformity  Cervical back: Neck supple  Right lower leg: No edema  Left lower leg: No edema  Lymphadenopathy:      Cervical: No cervical adenopathy  Skin:     General: Skin is warm  Neurological:      General: No focal deficit present  Mental Status: She is alert     Psychiatric: Mood and Affect: Mood normal          Behavior: Behavior normal          Thought Content:  Thought content normal          Judgment: Judgment normal

## 2021-07-20 ENCOUNTER — TELEPHONE (OUTPATIENT)
Dept: FAMILY MEDICINE CLINIC | Facility: CLINIC | Age: 41
End: 2021-07-20

## 2021-07-20 NOTE — TELEPHONE ENCOUNTER
----- Message from Erwin Cm PA-C sent at 7/20/2021  8:11 AM EDT -----    Current potassium level is good at this time  The levels at 3 7, normal is 3 5-5 5   I did send a message through [employer]

## 2021-10-31 DIAGNOSIS — E89.0 HYPOTHYROIDISM FOLLOWING RADIOIODINE THERAPY: ICD-10-CM

## 2021-11-01 ENCOUNTER — TELEPHONE (OUTPATIENT)
Dept: FAMILY MEDICINE CLINIC | Facility: CLINIC | Age: 41
End: 2021-11-01

## 2021-11-01 DIAGNOSIS — I10 ESSENTIAL HYPERTENSION: ICD-10-CM

## 2021-11-01 RX ORDER — HYDROCHLOROTHIAZIDE 25 MG/1
25 TABLET ORAL DAILY
Qty: 90 TABLET | Refills: 0 | Status: SHIPPED | OUTPATIENT
Start: 2021-11-01 | End: 2022-02-02 | Stop reason: SDUPTHER

## 2021-11-01 RX ORDER — LISINOPRIL 5 MG/1
5 TABLET ORAL DAILY
Qty: 90 TABLET | Refills: 0 | Status: SHIPPED | OUTPATIENT
Start: 2021-11-01 | End: 2022-02-02 | Stop reason: SDUPTHER

## 2021-11-01 RX ORDER — LEVOTHYROXINE SODIUM 175 UG/1
TABLET ORAL
Qty: 90 TABLET | Refills: 0 | Status: SHIPPED | OUTPATIENT
Start: 2021-11-01 | End: 2022-02-02 | Stop reason: SDUPTHER

## 2022-02-02 DIAGNOSIS — E89.0 HYPOTHYROIDISM FOLLOWING RADIOIODINE THERAPY: ICD-10-CM

## 2022-02-02 DIAGNOSIS — I10 ESSENTIAL HYPERTENSION: ICD-10-CM

## 2022-02-02 RX ORDER — LEVOTHYROXINE SODIUM 175 UG/1
175 TABLET ORAL DAILY
Qty: 14 TABLET | Refills: 0 | Status: SHIPPED | OUTPATIENT
Start: 2022-02-02 | End: 2022-02-08 | Stop reason: SDUPTHER

## 2022-02-02 RX ORDER — HYDROCHLOROTHIAZIDE 25 MG/1
25 TABLET ORAL DAILY
Qty: 14 TABLET | Refills: 0 | Status: SHIPPED | OUTPATIENT
Start: 2022-02-02 | End: 2022-02-08 | Stop reason: SDUPTHER

## 2022-02-02 RX ORDER — LISINOPRIL 5 MG/1
5 TABLET ORAL DAILY
Qty: 14 TABLET | Refills: 0 | Status: SHIPPED | OUTPATIENT
Start: 2022-02-02 | End: 2022-02-08 | Stop reason: SDUPTHER

## 2022-02-02 NOTE — TELEPHONE ENCOUNTER
I just got off the phone with her and she states that she got new insurance/[employer] and is not sure if it is covered with [address]  I have not seen her since July and she is due for her annual physical   Please call her to verify the insurance  In the meantime I did send a 14 day supply of her medications so she does not run out  She is aware of this    Thank you

## 2022-02-08 ENCOUNTER — OFFICE VISIT (OUTPATIENT)
Dept: FAMILY MEDICINE CLINIC | Facility: CLINIC | Age: 42
End: 2022-02-08
Payer: COMMERCIAL

## 2022-02-08 VITALS
OXYGEN SATURATION: 91 % | BODY MASS INDEX: 47.09 KG/M2 | SYSTOLIC BLOOD PRESSURE: 138 MMHG | HEIGHT: 66 IN | WEIGHT: 293 LBS | HEART RATE: 85 BPM | TEMPERATURE: 97.9 F | DIASTOLIC BLOOD PRESSURE: 78 MMHG | RESPIRATION RATE: 16 BRPM

## 2022-02-08 DIAGNOSIS — G47.33 OBSTRUCTIVE SLEEP APNEA TREATED WITH CONTINUOUS POSITIVE AIRWAY PRESSURE (CPAP): ICD-10-CM

## 2022-02-08 DIAGNOSIS — E89.0 HYPOTHYROIDISM FOLLOWING RADIOIODINE THERAPY: ICD-10-CM

## 2022-02-08 DIAGNOSIS — E66.01 OBESITY, CLASS III, BMI 40-49.9 (MORBID OBESITY) (HCC): ICD-10-CM

## 2022-02-08 DIAGNOSIS — Z99.89 OBSTRUCTIVE SLEEP APNEA TREATED WITH CONTINUOUS POSITIVE AIRWAY PRESSURE (CPAP): ICD-10-CM

## 2022-02-08 DIAGNOSIS — Z12.31 VISIT FOR SCREENING MAMMOGRAM: Primary | ICD-10-CM

## 2022-02-08 DIAGNOSIS — E78.1 PRIMARY HYPERTRIGLYCERIDEMIA: ICD-10-CM

## 2022-02-08 DIAGNOSIS — E87.6 HYPOKALEMIA: ICD-10-CM

## 2022-02-08 DIAGNOSIS — Z00.00 WELL ADULT EXAM: ICD-10-CM

## 2022-02-08 DIAGNOSIS — I10 ESSENTIAL HYPERTENSION: ICD-10-CM

## 2022-02-08 DIAGNOSIS — Z12.4 CERVICAL CANCER SCREENING: ICD-10-CM

## 2022-02-08 DIAGNOSIS — D50.8 IRON DEFICIENCY ANEMIA SECONDARY TO INADEQUATE DIETARY IRON INTAKE: ICD-10-CM

## 2022-02-08 DIAGNOSIS — I83.893 VARICOSE VEINS OF BOTH LEGS WITH EDEMA: ICD-10-CM

## 2022-02-08 PROBLEM — J06.9 ACUTE UPPER RESPIRATORY INFECTION: Status: RESOLVED | Noted: 2021-07-19 | Resolved: 2022-02-08

## 2022-02-08 PROCEDURE — 3078F DIAST BP <80 MM HG: CPT | Performed by: PHYSICIAN ASSISTANT

## 2022-02-08 PROCEDURE — 3008F BODY MASS INDEX DOCD: CPT | Performed by: PHYSICIAN ASSISTANT

## 2022-02-08 PROCEDURE — 99214 OFFICE O/P EST MOD 30 MIN: CPT | Performed by: PHYSICIAN ASSISTANT

## 2022-02-08 PROCEDURE — 3075F SYST BP GE 130 - 139MM HG: CPT | Performed by: PHYSICIAN ASSISTANT

## 2022-02-08 PROCEDURE — 1036F TOBACCO NON-USER: CPT | Performed by: PHYSICIAN ASSISTANT

## 2022-02-08 PROCEDURE — 99396 PREV VISIT EST AGE 40-64: CPT | Performed by: PHYSICIAN ASSISTANT

## 2022-02-08 PROCEDURE — 3725F SCREEN DEPRESSION PERFORMED: CPT | Performed by: PHYSICIAN ASSISTANT

## 2022-02-08 RX ORDER — HYDROCHLOROTHIAZIDE 25 MG/1
25 TABLET ORAL DAILY
Qty: 90 TABLET | Refills: 1 | Status: SHIPPED | OUTPATIENT
Start: 2022-02-08 | End: 2022-07-22 | Stop reason: SDUPTHER

## 2022-02-08 RX ORDER — LISINOPRIL 10 MG/1
10 TABLET ORAL DAILY
Qty: 90 TABLET | Refills: 1 | Status: SHIPPED | OUTPATIENT
Start: 2022-02-08 | End: 2022-07-22 | Stop reason: SDUPTHER

## 2022-02-08 RX ORDER — LEVOTHYROXINE SODIUM 175 UG/1
175 TABLET ORAL DAILY
Qty: 90 TABLET | Refills: 1 | Status: SHIPPED | OUTPATIENT
Start: 2022-02-08 | End: 2022-07-22 | Stop reason: SDUPTHER

## 2022-02-08 NOTE — PROGRESS NOTES
Assessment/Plan:    -I did refer her to gynecology for screening Pap smear  -encouraged to see the dentist regularly for cleanings  She states that she does have an upcoming appointment  -mammogram ordered  -refer to bariatric surgery  -refuses flu vaccine at this time  -routine physical in 1 year    M*Modal software was used to dictate this note  It may contain errors with dictating incorrect words/spelling  Please contact provider directly for any questions  BMI Counseling: Body mass index is 49 74 kg/m²  The BMI is above normal  Nutrition recommendations include reducing portion sizes and decreasing overall calorie intake  Exercise recommendations include exercising 3-5 times per week  Patient referred to bariatric surgery due to patient being morbidly obese  Diagnoses and all orders for this visit:    Visit for screening mammogram  -     Mammo screening bilateral w 3d & cad; Future    Well adult exam    Obesity, Class III, BMI 40-49 9 (morbid obesity) ([facility])          Subjective:      Patient ID: Mark Savage is a 37 y o  female  Patient presents today for her annual physical   Her chronic medical conditions will be addressed as a separate visit today  She states that she saw the dentist about a month ago because she was having tooth pain  She states that she did make an appointment for her routine cleaning  She does were glasses  She does see the eye doctor yearly  She was recently seen  Denies any hearing problems  She does not eat a healthy diet  She does not exercise  Denies smoking, drug use or vaping    She does drink alcohol socially  She is completely vaccinated with COVID 19 x 3  Her last Pap smear was done years ago  She just turned 41 and is due for her screening mammograms      The following portions of the patient's history were reviewed and updated as appropriate:   She  has a past medical history of Class 3 severe obesity due to excess calories with serious comorbidity and body mass index (BMI) of 45 0 to 49 9 in adult [facility]) (12/10/2018), Depression, Disease of thyroid gland, Edema of both legs (2012), Hypertension, Iron deficiency anemia secondary to inadequate dietary iron intake (1/2/2019), Macrocytosis (2012), and Vitamin D deficiency  She   Patient Active Problem List    Diagnosis Date Noted    Well adult exam 02/08/2022    Non-recurrent acute serous otitis media of right ear 07/19/2021    Acute upper respiratory infection 07/19/2021    Primary hypertriglyceridemia 04/09/2021    Left leg paresthesias 12/17/2020    Obstructive sleep apnea treated with continuous positive airway pressure (CPAP)     Hypothyroidism following radioiodine therapy 08/04/2020    Obesity, Class III, BMI 40-49 9 (morbid obesity) ([facility] [address] ) 04/21/2020    Cervical cancer screening 03/02/2020    Dysfunctional uterine bleeding 03/02/2020    Varicose veins of both legs with edema 03/02/2020    Iron deficiency anemia secondary to inadequate dietary iron intake 01/02/2019    Hypokalemia 01/02/2019    Essential hypertension 12/10/2018    Vitamin D deficiency 12/10/2018     She  has a past surgical history that includes Thyroid surgery and Tubal ligation (Bilateral, 2009)  Her family history includes Diabetes in her family  She  reports that she has never smoked  She has never used smokeless tobacco  She reports current alcohol use  She reports that she does not use drugs    Current Outpatient Medications   Medication Sig Dispense Refill    hydrochlorothiazide (HYDRODIURIL) 25 mg tablet Take 1 tablet (25 mg total) by mouth daily 14 tablet 0    ibuprofen (MOTRIN) 600 mg tablet Take 1 tablet (600 mg total) by mouth every 6 (six) hours as needed for moderate pain 20 tablet 0    levothyroxine 175 mcg tablet Take 1 tablet (175 mcg total) by mouth daily 14 tablet 0    lisinopril (ZESTRIL) 5 mg tablet Take 1 tablet (5 mg total) by mouth daily 14 tablet 0    Multiple Vitamin (multivitamin) capsule Take 1 capsule by mouth daily       No current facility-administered medications for this visit  Current Outpatient Medications on File Prior to Visit   Medication Sig    hydrochlorothiazide (HYDRODIURIL) 25 mg tablet Take 1 tablet (25 mg total) by mouth daily    ibuprofen (MOTRIN) 600 mg tablet Take 1 tablet (600 mg total) by mouth every 6 (six) hours as needed for moderate pain    levothyroxine 175 mcg tablet Take 1 tablet (175 mcg total) by mouth daily    lisinopril (ZESTRIL) 5 mg tablet Take 1 tablet (5 mg total) by mouth daily    Multiple Vitamin (multivitamin) capsule Take 1 capsule by mouth daily     No current facility-administered medications on file prior to visit  She has No Known Allergies       Review of Systems      Objective:      /78 (BP Location: Left arm, Patient Position: Sitting, Cuff Size: Large)   Pulse 85   Temp 97 9 °F (36 6 °C) (Tympanic)   Resp 16   Ht 5' 6" (1 676 m)   Wt (!) 140 kg (308 lb 3 2 oz)   SpO2 91%   Breastfeeding No   BMI 49 74 kg/m²          Physical Exam  Vitals reviewed  Constitutional:       General: She is not in acute distress  Appearance: Normal appearance  She is well-developed  She is not ill-appearing, toxic-appearing or diaphoretic  HENT:      Head: Normocephalic and atraumatic  Right Ear: Tympanic membrane, ear canal and external ear normal       Left Ear: Tympanic membrane, ear canal and external ear normal    Neck:      Thyroid: No thyromegaly  Cardiovascular:      Rate and Rhythm: Normal rate and regular rhythm  Heart sounds: Normal heart sounds  No murmur heard  No friction rub  No gallop  Pulmonary:      Effort: Pulmonary effort is normal  No respiratory distress  Breath sounds: Normal breath sounds  No wheezing, rhonchi or rales  Abdominal:      General: Bowel sounds are normal       Palpations: Abdomen is soft  There is no mass  Tenderness: There is no abdominal tenderness  Musculoskeletal:         General: No deformity  Cervical back: Neck supple  Right lower leg: No edema  Left lower leg: No edema  Lymphadenopathy:      Cervical: No cervical adenopathy  Skin:     General: Skin is warm  Neurological:      General: No focal deficit present  Mental Status: She is alert  Psychiatric:         Mood and Affect: Mood normal          Behavior: Behavior normal          Thought Content:  Thought content normal          Judgment: Judgment normal

## 2022-02-08 NOTE — PROGRESS NOTES
Assessment/Plan:    Since blood pressure has been elevated during the office visit I did recommend she increase her lisinopril 5 mg daily up to 10 mg daily  -I did recommend she bring her blood pressure monitor to the next office visit to make sure that is calibrated okay  She states that she gets lower readings at home   -she will proceed with blood work over the next week  -she did cancel her appointment with sleep medicine for May  I did encourage her to reschedule her appointment with sleep medicine since she feels uncomfortable with the CPAP  She has not been using the CPAP  I did advise her on the effects on her heart with uncontrolled sleep apnea  -I did refer her to bariatric surgery  She still unsure if she wants to have the surgery  I did advise her she should talk to them to discuss this further  I did advise her that the weight loss would also help with her sleep apnea  -mammogram ordered  -refer to gynecology for screening Pap smear  -continue medications as advised otherwise  -patient prefers to hold on flu vaccine  -she states that she is vaccinated with COVID-19 x3  -recommend follow-up in 5 months    M*Modal software was used to dictate this note  It may contain errors with dictating incorrect words/spelling  Please contact provider directly for any questions  Diagnoses and all orders for this visit:    Visit for screening mammogram  -     Mammo screening bilateral w 3d & cad; Future    Well adult exam    Obesity, Class III, BMI 40-49 9 (morbid obesity) (HCC)  -     CBC and differential  -     Comprehensive metabolic panel  -     Lipid panel  -     TSH, 3rd generation; Future  -     Microalbumin / creatinine urine ratio; Future  -     Ambulatory Referral to Bariatric Surgery; Future    Hypothyroidism following radioiodine therapy  -     CBC and differential  -     Comprehensive metabolic panel  -     Lipid panel  -     TSH, 3rd generation;  Future  -     Microalbumin / creatinine urine ratio; Future  -     levothyroxine 175 mcg tablet; Take 1 tablet (175 mcg total) by mouth daily    Obstructive sleep apnea treated with continuous positive airway pressure (CPAP)  -     CBC and differential  -     Comprehensive metabolic panel  -     Lipid panel  -     TSH, 3rd generation; Future  -     Microalbumin / creatinine urine ratio; Future    Varicose veins of both legs with edema  -     CBC and differential  -     Comprehensive metabolic panel  -     Lipid panel  -     TSH, 3rd generation; Future  -     Microalbumin / creatinine urine ratio; Future    Essential hypertension  -     CBC and differential  -     Comprehensive metabolic panel  -     Lipid panel  -     TSH, 3rd generation; Future  -     Microalbumin / creatinine urine ratio; Future  -     lisinopril (ZESTRIL) 10 mg tablet; Take 1 tablet (10 mg total) by mouth daily  -     hydrochlorothiazide (HYDRODIURIL) 25 mg tablet; Take 1 tablet (25 mg total) by mouth daily    Primary hypertriglyceridemia  -     CBC and differential  -     Comprehensive metabolic panel  -     Lipid panel  -     TSH, 3rd generation; Future  -     Microalbumin / creatinine urine ratio; Future    Iron deficiency anemia secondary to inadequate dietary iron intake  -     CBC and differential  -     Comprehensive metabolic panel  -     Lipid panel  -     TSH, 3rd generation; Future  -     Microalbumin / creatinine urine ratio; Future    Hypokalemia  -     CBC and differential  -     Comprehensive metabolic panel  -     Lipid panel  -     TSH, 3rd generation; Future  -     Microalbumin / creatinine urine ratio; Future    Cervical cancer screening  -     Ambulatory Referral to Gynecology; Future          Subjective:      Patient ID: Esvin Lamb is a 37 y o  female  Patient presents today for routine follow-up for hypertension, hypothyroidism, elevated triglycerides, swelling of her lower extremities, sleep apnea    She states that she does not utilize her CPAP because it is uncomfortable  She did have an appointment with the sleep specialist in May but she did cancel it  I did refer her to medical weight management in the past she states that she only went once but never continued  She may consider bariatric surgery but still unsure  She has not taken her lisinopril 5 mg over the past 2 days  She states that when she checks her blood pressure at home it is around 120  She has never brought her blood pressure monitor to the office  Last blood work was in May 2021      The following portions of the patient's history were reviewed and updated as appropriate:   She  has a past medical history of Class 3 severe obesity due to excess calories with serious comorbidity and body mass index (BMI) of 45 0 to 49 9 in adult [facility]) (12/10/2018), Depression, Disease of thyroid gland, Edema of both legs (2012), Hypertension, Iron deficiency anemia secondary to inadequate dietary iron intake (1/2/2019), Macrocytosis (2012), and Vitamin D deficiency  She   Patient Active Problem List    Diagnosis Date Noted    Well adult exam 02/08/2022    Non-recurrent acute serous otitis media of right ear 07/19/2021    Primary hypertriglyceridemia 04/09/2021    Left leg paresthesias 12/17/2020    Obstructive sleep apnea treated with continuous positive airway pressure (CPAP)     Hypothyroidism following radioiodine therapy 08/04/2020    Obesity, Class III, BMI 40-49 9 (morbid obesity) ([facility] [address] ) 04/21/2020    Cervical cancer screening 03/02/2020    Dysfunctional uterine bleeding 03/02/2020    Varicose veins of both legs with edema 03/02/2020    Iron deficiency anemia secondary to inadequate dietary iron intake 01/02/2019    Hypokalemia 01/02/2019    Essential hypertension 12/10/2018    Vitamin D deficiency 12/10/2018     She  has a past surgical history that includes Thyroid surgery and Tubal ligation (Bilateral, 2009)  Her family history includes Diabetes in her family    She  reports that she has never smoked  She has never used smokeless tobacco  She reports current alcohol use  She reports that she does not use drugs  Current Outpatient Medications   Medication Sig Dispense Refill    hydrochlorothiazide (HYDRODIURIL) 25 mg tablet Take 1 tablet (25 mg total) by mouth daily 90 tablet 1    ibuprofen (MOTRIN) 600 mg tablet Take 1 tablet (600 mg total) by mouth every 6 (six) hours as needed for moderate pain 20 tablet 0    levothyroxine 175 mcg tablet Take 1 tablet (175 mcg total) by mouth daily 90 tablet 1    lisinopril (ZESTRIL) 10 mg tablet Take 1 tablet (10 mg total) by mouth daily 90 tablet 1    Multiple Vitamin (multivitamin) capsule Take 1 capsule by mouth daily       No current facility-administered medications for this visit  Current Outpatient Medications on File Prior to Visit   Medication Sig    ibuprofen (MOTRIN) 600 mg tablet Take 1 tablet (600 mg total) by mouth every 6 (six) hours as needed for moderate pain    Multiple Vitamin (multivitamin) capsule Take 1 capsule by mouth daily    [DISCONTINUED] hydrochlorothiazide (HYDRODIURIL) 25 mg tablet Take 1 tablet (25 mg total) by mouth daily    [DISCONTINUED] levothyroxine 175 mcg tablet Take 1 tablet (175 mcg total) by mouth daily    [DISCONTINUED] lisinopril (ZESTRIL) 5 mg tablet Take 1 tablet (5 mg total) by mouth daily     No current facility-administered medications on file prior to visit  She has No Known Allergies       Review of Systems   Constitutional: Negative  Respiratory: Negative for cough and shortness of breath  Cardiovascular: Negative for chest pain and leg swelling (She states the hydrochlorothiazide helps with the swelling in her legs)  Gastrointestinal: Negative for abdominal pain           Objective:      /78 (BP Location: Left arm, Patient Position: Sitting, Cuff Size: Large)   Pulse 85   Temp 97 9 °F (36 6 °C) (Tympanic)   Resp 16   Ht 5' 6" (1 676 m)   Wt (!) 140 kg (308 lb 3 2 oz)   SpO2 91%   Breastfeeding No   BMI 49 74 kg/m²          Physical Exam  Vitals reviewed  Constitutional:       General: She is not in acute distress  Appearance: Normal appearance  She is well-developed  She is not ill-appearing, toxic-appearing or diaphoretic  HENT:      Head: Normocephalic and atraumatic  Right Ear: Tympanic membrane, ear canal and external ear normal  There is no impacted cerumen  Left Ear: Tympanic membrane, ear canal and external ear normal  There is no impacted cerumen  Neck:      Thyroid: No thyromegaly  Cardiovascular:      Rate and Rhythm: Normal rate and regular rhythm  Heart sounds: Normal heart sounds  No murmur heard  No friction rub  No gallop  Pulmonary:      Effort: Pulmonary effort is normal  No respiratory distress  Breath sounds: Normal breath sounds  No wheezing, rhonchi or rales  Abdominal:      General: Bowel sounds are normal       Palpations: Abdomen is soft  There is no mass  Tenderness: There is no abdominal tenderness  Musculoskeletal:         General: No deformity  Cervical back: Neck supple  Right lower leg: No edema  Left lower leg: No edema  Lymphadenopathy:      Cervical: No cervical adenopathy  Skin:     General: Skin is warm  Neurological:      General: No focal deficit present  Mental Status: She is alert  Psychiatric:         Mood and Affect: Mood normal          Behavior: Behavior normal          Thought Content:  Thought content normal          Judgment: Judgment normal

## 2022-03-07 ENCOUNTER — APPOINTMENT (OUTPATIENT)
Dept: LAB | Facility: HOSPITAL | Age: 42
End: 2022-03-07
Payer: COMMERCIAL

## 2022-03-07 DIAGNOSIS — D50.8 IRON DEFICIENCY ANEMIA SECONDARY TO INADEQUATE DIETARY IRON INTAKE: ICD-10-CM

## 2022-03-07 DIAGNOSIS — G47.33 OBSTRUCTIVE SLEEP APNEA TREATED WITH CONTINUOUS POSITIVE AIRWAY PRESSURE (CPAP): ICD-10-CM

## 2022-03-07 DIAGNOSIS — E66.01 OBESITY, CLASS III, BMI 40-49.9 (MORBID OBESITY) (HCC): ICD-10-CM

## 2022-03-07 DIAGNOSIS — I10 ESSENTIAL HYPERTENSION: ICD-10-CM

## 2022-03-07 DIAGNOSIS — E87.6 HYPOKALEMIA: ICD-10-CM

## 2022-03-07 DIAGNOSIS — E89.0 HYPOTHYROIDISM FOLLOWING RADIOIODINE THERAPY: ICD-10-CM

## 2022-03-07 DIAGNOSIS — I83.893 VARICOSE VEINS OF BOTH LEGS WITH EDEMA: ICD-10-CM

## 2022-03-07 DIAGNOSIS — E78.1 PRIMARY HYPERTRIGLYCERIDEMIA: ICD-10-CM

## 2022-03-07 DIAGNOSIS — Z99.89 OBSTRUCTIVE SLEEP APNEA TREATED WITH CONTINUOUS POSITIVE AIRWAY PRESSURE (CPAP): ICD-10-CM

## 2022-03-07 LAB
ALBUMIN SERPL BCP-MCNC: 3.8 G/DL (ref 3–5.2)
ALP SERPL-CCNC: 98 U/L (ref 43–122)
ALT SERPL W P-5'-P-CCNC: 13 U/L
ANION GAP SERPL CALCULATED.3IONS-SCNC: 7 MMOL/L (ref 5–14)
ANISOCYTOSIS BLD QL SMEAR: PRESENT
AST SERPL W P-5'-P-CCNC: 18 U/L (ref 14–36)
BASOPHILS # BLD AUTO: 0 THOUSANDS/ΜL (ref 0–0.1)
BASOPHILS NFR BLD AUTO: 1 % (ref 0–1)
BILIRUB SERPL-MCNC: 0.52 MG/DL
BUN SERPL-MCNC: 15 MG/DL (ref 5–25)
CALCIUM SERPL-MCNC: 8.4 MG/DL (ref 8.4–10.2)
CHLORIDE SERPL-SCNC: 100 MMOL/L (ref 97–108)
CHOLEST SERPL-MCNC: 158 MG/DL
CO2 SERPL-SCNC: 29 MMOL/L (ref 22–30)
CREAT SERPL-MCNC: 0.75 MG/DL (ref 0.6–1.2)
CREAT UR-MCNC: 183 MG/DL
EOSINOPHIL # BLD AUTO: 0.1 THOUSAND/ΜL (ref 0–0.4)
EOSINOPHIL NFR BLD AUTO: 2 % (ref 0–6)
ERYTHROCYTE [DISTWIDTH] IN BLOOD BY AUTOMATED COUNT: 17.4 %
GFR SERPL CREATININE-BSD FRML MDRD: 100 ML/MIN/1.73SQ M
GLUCOSE P FAST SERPL-MCNC: 113 MG/DL (ref 70–99)
HCT VFR BLD AUTO: 34.5 % (ref 36–46)
HDLC SERPL-MCNC: 49 MG/DL
HGB BLD-MCNC: 10.6 G/DL (ref 12–16)
HYPERCHROMIA BLD QL SMEAR: PRESENT
LDLC SERPL CALC-MCNC: 81 MG/DL
LYMPHOCYTES # BLD AUTO: 1.2 THOUSANDS/ΜL (ref 0.5–4)
LYMPHOCYTES NFR BLD AUTO: 19 % (ref 25–45)
MCH RBC QN AUTO: 22.7 PG (ref 26–34)
MCHC RBC AUTO-ENTMCNC: 30.7 G/DL (ref 31–36)
MCV RBC AUTO: 74 FL (ref 80–100)
MICROALBUMIN UR-MCNC: 9.3 MG/L (ref 0–20)
MICROALBUMIN/CREAT 24H UR: 5 MG/G CREATININE (ref 0–30)
MICROCYTES BLD QL AUTO: PRESENT
MONOCYTES # BLD AUTO: 0.4 THOUSAND/ΜL (ref 0.2–0.9)
MONOCYTES NFR BLD AUTO: 7 % (ref 1–10)
NEUTROPHILS # BLD AUTO: 4.3 THOUSANDS/ΜL (ref 1.8–7.8)
NEUTS SEG NFR BLD AUTO: 71 % (ref 45–65)
NONHDLC SERPL-MCNC: 109 MG/DL
PLATELET # BLD AUTO: 296 THOUSANDS/UL (ref 150–450)
PLATELET BLD QL SMEAR: ADEQUATE
PMV BLD AUTO: 8.7 FL (ref 8.9–12.7)
POTASSIUM SERPL-SCNC: 3.4 MMOL/L (ref 3.6–5)
PROT SERPL-MCNC: 7.3 G/DL (ref 5.9–8.4)
RBC # BLD AUTO: 4.67 MILLION/UL (ref 4–5.2)
RBC MORPH BLD: NORMAL
SODIUM SERPL-SCNC: 136 MMOL/L (ref 137–147)
TRIGL SERPL-MCNC: 139 MG/DL
TSH SERPL DL<=0.05 MIU/L-ACNC: 1.03 UIU/ML (ref 0.47–4.68)
WBC # BLD AUTO: 6.1 THOUSAND/UL (ref 4.5–11)

## 2022-03-07 PROCEDURE — 80053 COMPREHEN METABOLIC PANEL: CPT | Performed by: PHYSICIAN ASSISTANT

## 2022-03-07 PROCEDURE — 85025 COMPLETE CBC W/AUTO DIFF WBC: CPT | Performed by: PHYSICIAN ASSISTANT

## 2022-03-07 PROCEDURE — 80061 LIPID PANEL: CPT | Performed by: PHYSICIAN ASSISTANT

## 2022-03-07 PROCEDURE — 84443 ASSAY THYROID STIM HORMONE: CPT

## 2022-03-07 PROCEDURE — 82570 ASSAY OF URINE CREATININE: CPT

## 2022-03-07 PROCEDURE — 36415 COLL VENOUS BLD VENIPUNCTURE: CPT | Performed by: PHYSICIAN ASSISTANT

## 2022-03-07 PROCEDURE — 82043 UR ALBUMIN QUANTITATIVE: CPT

## 2022-03-08 NOTE — RESULT ENCOUNTER NOTE
Please let her know that her hemoglobin/blood count has dropped even lower at 10 6  She should start an over-the-counter iron supplement twice daily along with vitamin-C 500 mg once a day to help with the absorption of iron  I would also like to refer her to a blood specialist/Hematology  I did put an order in the system  Her potassium has decreased again from 3 7 down to 3 4  Normal is 3 5 to 5 0  She should eat a potassium rich food daily  I did give her a diet in July  Remaining blood work for liver, blood sugar, kidneys, cholesterol panel are good    Thank you

## 2022-03-11 ENCOUNTER — TELEPHONE (OUTPATIENT)
Dept: HEMATOLOGY ONCOLOGY | Facility: CLINIC | Age: 42
End: 2022-03-11

## 2022-07-22 DIAGNOSIS — E89.0 HYPOTHYROIDISM FOLLOWING RADIOIODINE THERAPY: ICD-10-CM

## 2022-07-22 DIAGNOSIS — I10 ESSENTIAL HYPERTENSION: ICD-10-CM

## 2022-07-22 RX ORDER — LEVOTHYROXINE SODIUM 175 UG/1
175 TABLET ORAL DAILY
Qty: 30 TABLET | Refills: 0 | Status: SHIPPED | OUTPATIENT
Start: 2022-07-22

## 2022-07-22 RX ORDER — LISINOPRIL 10 MG/1
10 TABLET ORAL DAILY
Qty: 30 TABLET | Refills: 0 | Status: SHIPPED | OUTPATIENT
Start: 2022-07-22

## 2022-07-22 RX ORDER — HYDROCHLOROTHIAZIDE 25 MG/1
25 TABLET ORAL DAILY
Qty: 30 TABLET | Refills: 0 | Status: SHIPPED | OUTPATIENT
Start: 2022-07-22

## 2022-07-22 NOTE — TELEPHONE ENCOUNTER
I did get refill requests  Patient sent a message through [address]  I did notice that she is due for an appointment  I did call her and she states that she no longer has her health insurance through her employer because she is no longer working  She is concerned about the cost for a follow-up visit  I did give [facility] the [facility]  I did send a 30 day supply of her medications to hold her over until she is seen for follow-up

## 2022-10-12 PROBLEM — H65.01 NON-RECURRENT ACUTE SEROUS OTITIS MEDIA OF RIGHT EAR: Status: RESOLVED | Noted: 2021-07-19 | Resolved: 2022-10-12

## 2022-10-12 PROBLEM — Z00.00 WELL ADULT EXAM: Status: RESOLVED | Noted: 2022-02-08 | Resolved: 2022-10-12

## 2023-01-19 ENCOUNTER — TELEPHONE (OUTPATIENT)
Dept: FAMILY MEDICINE CLINIC | Facility: CLINIC | Age: 43
End: 2023-01-19

## 2023-01-19 NOTE — TELEPHONE ENCOUNTER
Please call her because I have not seen her since February  In July I did speak to her personally and she stated that she did not have health insurance at the time  She was concerned about the cost of the office visit I did give her the phone number for [facility] and advised her that they do give reduced fees for patients who do not have insurance  I do not see that she was ever seen there in the system  Maybe she got health insurance again?   Thank you

## 2023-02-08 ENCOUNTER — HOSPITAL ENCOUNTER (EMERGENCY)
Facility: HOSPITAL | Age: 43
Discharge: HOME | End: 2023-02-08
Attending: EMERGENCY MEDICINE

## 2023-02-08 VITALS
DIASTOLIC BLOOD PRESSURE: 73 MMHG | BODY MASS INDEX: 51.6 KG/M2 | WEIGHT: 293 LBS | TEMPERATURE: 98.4 F | RESPIRATION RATE: 18 BRPM | SYSTOLIC BLOOD PRESSURE: 143 MMHG | OXYGEN SATURATION: 98 % | HEART RATE: 78 BPM

## 2023-02-08 DIAGNOSIS — H10.9 BILATERAL CONJUNCTIVITIS: Primary | ICD-10-CM

## 2023-02-08 LAB
FLUAV RNA RESP QL NAA+PROBE: NEGATIVE
FLUBV RNA RESP QL NAA+PROBE: NEGATIVE
RSV RNA RESP QL NAA+PROBE: NEGATIVE
SARS-COV-2 RNA RESP QL NAA+PROBE: NEGATIVE

## 2023-02-08 RX ORDER — TOBRAMYCIN 3 MG/ML
1 SOLUTION/ DROPS OPHTHALMIC ONCE
Status: COMPLETED | OUTPATIENT
Start: 2023-02-08 | End: 2023-02-08

## 2023-02-08 RX ADMIN — TOBRAMYCIN 1 DROP: 3 SOLUTION OPHTHALMIC at 18:18

## 2023-02-08 NOTE — ED PROVIDER NOTES
History  Chief Complaint   Patient presents with   • Eye Swelling     States Bilateral eye swelling with yellow/green drainage  Denies pain  15-year-old female with past medical history of hypothyroidism presents emergency department today complaining of bilateral eye discharge has been ongoing since Saturday  She describes it as a yellowish-greenish discharge  It originally started in the right eye and moved to the left  When she wakes up in the morning she notices her eye is crusted shut  Does not wear contacts and only wears glasses  She denies any change in vision, blurry vision, diplopia, photophobia  She has an occasional headache and sore throat  No changes in hearing  Prior to Admission Medications   Prescriptions Last Dose Informant Patient Reported? Taking?    Multiple Vitamin (multivitamin) capsule   Yes No   Sig: Take 1 capsule by mouth daily   hydrochlorothiazide (HYDRODIURIL) 25 mg tablet   No No   Sig: Take 1 tablet (25 mg total) by mouth daily   ibuprofen (MOTRIN) 800 mg tablet   Yes No   Sig: Take 800 mg by mouth every 6 to 8 hours as needed for pain   levothyroxine 175 mcg tablet   No No   Sig: Take 1 tablet (175 mcg total) by mouth daily   lisinopril (ZESTRIL) 10 mg tablet   No No   Sig: Take 1 tablet (10 mg total) by mouth daily      Facility-Administered Medications: None       Past Medical History:   Diagnosis Date   • Class 3 severe obesity due to excess calories with serious comorbidity and body mass index (BMI) of 45 0 to 49 9 in [facility]) 12/10/2018   • Depression    • Disease of thyroid gland    • Edema of both legs 2012   • Hypertension    • Iron deficiency anemia secondary to inadequate dietary iron intake 1/2/2019   • Macrocytosis 2012   • Vitamin D deficiency        Past Surgical History:   Procedure Laterality Date   • THYROID SURGERY      ablation   • TUBAL LIGATION Bilateral 2009       Family History   Problem Relation Age of Onset   • Diabetes Family      I have reviewed and agree with the history as documented  E-Cigarette/Vaping   • E-Cigarette Use Never User      E-Cigarette/Vaping Substances   • Nicotine No    • THC No    • CBD No    • Flavoring No    • Other No    • Unknown No      Social History     Tobacco Use   • Smoking status: Never   • Smokeless tobacco: Never   Vaping Use   • Vaping Use: Never used   Substance Use Topics   • Alcohol use: Yes   • Drug use: No        Review of Systems   HENT: Positive for sore throat  Negative for ear pain  Eyes: Positive for discharge and redness  Negative for photophobia, pain, itching and visual disturbance  Neurological: Positive for headaches  All other systems reviewed and are negative  Physical Exam  ED Triage Vitals [02/08/23 1703]   Temperature Pulse Respirations Blood Pressure SpO2   98 4 °F (36 9 °C) 78 18 143/73 98 %      Temp Source Heart Rate Source Patient Position - Orthostatic VS BP Location FiO2 (%)   Oral Monitor Sitting Right arm --      Pain Score       No Pain             Orthostatic Vital Signs  Vitals:    02/08/23 1703   BP: 143/73   Pulse: 78   Patient Position - Orthostatic VS: Sitting       Physical Exam  Vitals and nursing note reviewed  Constitutional:       General: She is not in acute distress  Appearance: She is well-developed  HENT:      Head: Normocephalic and atraumatic  Eyes:      General:         Right eye: Discharge present  Left eye: Discharge present  Extraocular Movements: Extraocular movements intact  Conjunctiva/sclera:      Right eye: Right conjunctiva is injected  No chemosis or hemorrhage  Left eye: Left conjunctiva is injected  No chemosis or hemorrhage  Pupils: Pupils are equal, round, and reactive to light  Cardiovascular:      Rate and Rhythm: Normal rate and regular rhythm  Heart sounds: No murmur heard  Pulmonary:      Effort: Pulmonary effort is normal  No respiratory distress        Breath sounds: Normal breath sounds  Abdominal:      Palpations: Abdomen is soft  Tenderness: There is no abdominal tenderness  Musculoskeletal:         General: No swelling  Cervical back: Neck supple  Skin:     General: Skin is warm and dry  Capillary Refill: Capillary refill takes less than 2 seconds  Neurological:      Mental Status: She is alert  Psychiatric:         Mood and Affect: Mood normal          ED Medications  Medications   tobramycin (TOBREX) 0 3 % ophthalmic solution 1 drop (1 drop Both Eyes Given 2/8/23 1818)       Diagnostic Studies  Results Reviewed     Procedure Component Value Units Date/Time    FLU/RSV/COVID - if FLU/RSV clinically relevant [[id number]] Collected: 02/08/23 1818    Lab Status: In process Specimen: Nares from Nose Updated: 02/08/23 1838                 No orders to display         Procedures  Procedures      ED Course                                       Medical Decision Making  36-year-old female presents emergency department with bilateral conjunctivitis and purulent exudate    DDx: Bacterial conjunctivitis, viral conjunctivitis    Plan: Treat empirically with topical tobramycin drops    Patient was given antibiotic eyedrops in the emergency department and discharged with a bottle  Patient mention she has difficulty following up with PCP because of insurance issues  Given ambulatory referral and contact information for wellness health clinic  Patient given strict return precautions to the emergency department  Verbalized understanding and was discharged home     's presentation was not concerning for foreign body of the eye or herpes simplex keratitis given bilateral nature  Bilateral conjunctivitis: acute illness or injury  Risk  Prescription drug management              Disposition  Final diagnoses:   Bilateral conjunctivitis     Time reflects when diagnosis was documented in both MDM as applicable and the Disposition within this note     Time User Action Codes Description Comment    2/8/2023  5:58 PM Lorri Swati Add [H10 9] Bilateral conjunctivitis       ED Disposition     ED Disposition   Discharge    Condition   Stable    Date/Time   Wed Feb 8, 2023  5:53 PM    Comment   Sonia Butt discharge to home/self care  Follow-up Information     Follow up With Specialties Details [facility] [address] [facility]  Emergency Department Emergency Medicine Go to  If symptoms worsen [facility] [id number]  [address] [facility] Emergency Department, [address] [facility] , [place], [place], [address]    Mikki Gilbert PA-C Family Medicine, Physician Assistant   [facility] [id number]  [address] [facility] Virtual  Call in 1 day [phone] [facility] [place] [address]           Discharge Medication List as of 2/8/2023  6:05 PM      CONTINUE these medications which have NOT CHANGED    Details   hydrochlorothiazide (HYDRODIURIL) 25 mg tablet Take 1 tablet (25 mg total) by mouth daily, Starting Fri 7/22/2022, Normal      ibuprofen (MOTRIN) 800 mg tablet Take 800 mg by mouth every 6 to 8 hours as needed for pain, Starting Thu 2/3/2022, Historical Med      levothyroxine 175 mcg tablet Take 1 tablet (175 mcg total) by mouth daily, Starting Fri 7/22/2022, Normal      lisinopril (ZESTRIL) 10 mg tablet Take 1 tablet (10 mg total) by mouth daily, Starting Fri 7/22/2022, Normal      Multiple Vitamin (multivitamin) capsule Take 1 capsule by mouth daily, Historical Med               PDMP Review     None           ED Provider  Attending physically available and evaluated Janett Mosqueda  [facility] managed the patient along with the ED Attending      Electronically Signed by         Alie Gongora DO  02/08/23 1955

## 2023-02-08 NOTE — DISCHARGE INSTRUCTIONS
Haris Shannon was seen and evaluated today in the emergency department over your concern of bilateral eye drainage  The workup that we performed showed that you have bilateral bacterial conjunctivitis  Please return to the emergency department if you experience change in vision, loss of consciousness, headache or any other signs and symptoms that may be concerning to you  Please follow-up with your primary care doctor within 1 day  All questions were answered prior to discharge  Thank you for choosing [facility] for your care  Attached is a number for [facility]  Please follow-up with them as a are able to see patients that do not have insurance  Return to the emergency department if your eyes do not improve  Please use the drops every 4 hours while awake for 7 days  Results of the COVID, flu, RSV test are pending and you could follow-up with the test results on [facility] MyChart  If the results are positive someone from the hospital will call

## 2023-02-08 NOTE — ED ATTENDING ATTESTATION
2/8/2023  I, Kendell Mcconnell MD, saw and evaluated the patient  I have discussed the patient with the resident/non-physician practitioner and agree with the resident's/non-physician practitioner's findings, Plan of Care, and MDM as documented in the resident's/non-physician practitioner's note, except where noted  All available labs and Radiology studies were reviewed  I was present for key portions of any procedure(s) performed by the resident/non-physician practitioner and I was immediately available to provide assistance  At this point I agree with the current assessment done in the Emergency Department  I have conducted an independent evaluation of this patient a history and physical is as follows:    ED Course         Critical Care Time  Procedures    C/o b/l eye discharge since 4 days ago  Eyes are glued shut in am, she has to use a warm washcloth to get rid of it  No fevers, no cough  No sick contacts  Pt  Isn't allergic to anything  Doesn't wear contacts  Occasional headache and sore throat    B/l sclera reddened, +conjuncitivitis, +drainage, TM's clear, ooropharynx clear, RRR, CTA b/l, abd  Nontender, ext   No edema

## 2023-03-07 ENCOUNTER — OFFICE VISIT (OUTPATIENT)
Dept: FAMILY MEDICINE CLINIC | Facility: CLINIC | Age: 43
End: 2023-03-07

## 2023-03-07 VITALS
OXYGEN SATURATION: 98 % | WEIGHT: 293 LBS | BODY MASS INDEX: 47.09 KG/M2 | TEMPERATURE: 97.9 F | SYSTOLIC BLOOD PRESSURE: 136 MMHG | HEIGHT: 66 IN | HEART RATE: 67 BPM | DIASTOLIC BLOOD PRESSURE: 84 MMHG | RESPIRATION RATE: 16 BRPM

## 2023-03-07 DIAGNOSIS — D50.8 IRON DEFICIENCY ANEMIA SECONDARY TO INADEQUATE DIETARY IRON INTAKE: ICD-10-CM

## 2023-03-07 DIAGNOSIS — G47.33 OBSTRUCTIVE SLEEP APNEA TREATED WITH CONTINUOUS POSITIVE AIRWAY PRESSURE (CPAP): ICD-10-CM

## 2023-03-07 DIAGNOSIS — Z99.89 OBSTRUCTIVE SLEEP APNEA TREATED WITH CONTINUOUS POSITIVE AIRWAY PRESSURE (CPAP): ICD-10-CM

## 2023-03-07 DIAGNOSIS — I83.893 VARICOSE VEINS OF BOTH LEGS WITH EDEMA: ICD-10-CM

## 2023-03-07 DIAGNOSIS — E87.6 HYPOKALEMIA: ICD-10-CM

## 2023-03-07 DIAGNOSIS — Z12.31 ENCOUNTER FOR SCREENING MAMMOGRAM FOR MALIGNANT NEOPLASM OF BREAST: ICD-10-CM

## 2023-03-07 DIAGNOSIS — E89.0 HYPOTHYROIDISM FOLLOWING RADIOIODINE THERAPY: Primary | ICD-10-CM

## 2023-03-07 DIAGNOSIS — I10 ESSENTIAL HYPERTENSION: ICD-10-CM

## 2023-03-07 DIAGNOSIS — E78.1 PRIMARY HYPERTRIGLYCERIDEMIA: ICD-10-CM

## 2023-03-07 RX ORDER — HYDROCHLOROTHIAZIDE 25 MG/1
25 TABLET ORAL DAILY
Qty: 90 TABLET | Refills: 1 | Status: SHIPPED | OUTPATIENT
Start: 2023-03-07

## 2023-03-07 RX ORDER — LEVOTHYROXINE SODIUM 175 UG/1
175 TABLET ORAL DAILY
Qty: 90 TABLET | Refills: 1 | Status: SHIPPED | OUTPATIENT
Start: 2023-03-07

## 2023-03-07 NOTE — PROGRESS NOTES
Name: Markus Butt      : 1980      MRN: [id number]  Encounter Provider: Edger Schwab, PA-C  Encounter Date: 3/7/2023   Encounter department: [facility] [address] [place] [address]     1  Hypothyroidism following radioiodine therapy  -     CBC and differential  -     Comprehensive metabolic panel  -     Lipid panel  -     TSH, 3rd generation; Future  -     levothyroxine 175 mcg tablet; Take 1 tablet (175 mcg total) by mouth daily    2  Obstructive sleep apnea treated with continuous positive airway pressure (CPAP)  -     CBC and differential  -     Comprehensive metabolic panel  -     Lipid panel  -     TSH, 3rd generation; Future    3  Varicose veins of both legs with edema  -     CBC and differential  -     Comprehensive metabolic panel  -     Lipid panel  -     TSH, 3rd generation; Future    4  Essential hypertension  -     CBC and differential  -     Comprehensive metabolic panel  -     Lipid panel  -     TSH, 3rd generation; Future  -     hydrochlorothiazide (HYDRODIURIL) 25 mg tablet; Take 1 tablet (25 mg total) by mouth daily    5  Primary hypertriglyceridemia  -     CBC and differential  -     Comprehensive metabolic panel  -     Lipid panel  -     TSH, 3rd generation; Future    6  Iron deficiency anemia secondary to inadequate dietary iron intake  -     CBC and differential  -     Comprehensive metabolic panel  -     Lipid panel  -     TSH, 3rd generation; Future    7  Hypokalemia  -     CBC and differential  -     Comprehensive metabolic panel  -     Lipid panel  -     TSH, 3rd generation; Future    8   Encounter for screening mammogram for malignant neoplasm of breast  -     Mammo screening bilateral w 3d & cad; Future; Expected date: 2023          -I did order blood work but I did advise her to wait for at least 4 to 6 weeks prior to doing the blood work since she is restarting her levothyroxine 175 mcg daily along with her hydrochlorothiazide 25 mg daily  I did give her the phone number for [phone] [employer]  case she has a high deductible with her new insurance  - I did advise him on the importance of using CPAP nightly  - Mammogram screening has been ordered again  -Hold lisinopril 10 mg daily since her blood pressure has been good without it  Especially since she is starting the hydrochlorothiazide which will also lower blood pressure  - I did recommend follow-up in 5 months, sooner if needed    M*Modal software was used to dictate this note  It may contain errors with dictating incorrect words/spelling  Please contact provider directly for any questions  Subjective      Patient presents today for routine follow-up  I have not seen her since February 2022 because she states that she has not had any insurance  She has been noticing swelling in her lower extremities so she would like to get back on her hydrochlorothiazide 25 mg daily  She is also been off of her levothyroxine 175 mcg  She recently started a job and she is getting insurance in 90 days but she needs to be seen now  She denies any chest pain, shortness of breath  She states that she does not like utilizing her CPAP for her sleep apnea  Review of Systems   Constitutional: Negative  Respiratory: Negative for shortness of breath  Cardiovascular: Positive for leg swelling  Negative for chest pain         Current Outpatient Medications on File Prior to Visit   Medication Sig   • Multiple Vitamin (multivitamin) capsule Take 1 capsule by mouth daily   • [DISCONTINUED] hydrochlorothiazide (HYDRODIURIL) 25 mg tablet Take 1 tablet (25 mg total) by mouth daily   • [DISCONTINUED] levothyroxine 175 mcg tablet Take 1 tablet (175 mcg total) by mouth daily   • ibuprofen (MOTRIN) 800 mg tablet Take 800 mg by mouth every 6 to 8 hours as needed for pain (Patient not taking: Reported on 3/7/2023)   • lisinopril (ZESTRIL) 10 mg tablet Take 1 tablet (10 mg total) by mouth daily Objective     /84 (BP Location: Left arm, Patient Position: Sitting, Cuff Size: Large)   Pulse 67   Temp 97 9 °F (36 6 °C) (Tympanic)   Resp 16   Ht 5' 6" (1 676 m)   Wt (!) 147 kg (324 lb)   LMP 02/08/2023 (Exact Date)   SpO2 98%   BMI 52 29 kg/m²     Physical Exam  Vitals reviewed  Constitutional:       General: She is not in acute distress  Appearance: Normal appearance  She is well-developed  She is not ill-appearing, toxic-appearing or diaphoretic  HENT:      Head: Normocephalic and atraumatic  Neck:      Thyroid: No thyromegaly  Cardiovascular:      Rate and Rhythm: Normal rate and regular rhythm  Heart sounds: Normal heart sounds  No murmur heard  No friction rub  No gallop  Pulmonary:      Effort: Pulmonary effort is normal  No respiratory distress  Breath sounds: Normal breath sounds  No wheezing, rhonchi or rales  Abdominal:      General: Bowel sounds are normal       Palpations: Abdomen is soft  There is no mass  Tenderness: There is no abdominal tenderness  Musculoskeletal:         General: No deformity  Cervical back: Neck supple  Right lower leg: No edema  Left lower leg: No edema  Lymphadenopathy:      Cervical: No cervical adenopathy  Skin:     General: Skin is warm  Neurological:      General: No focal deficit present  Mental Status: She is alert  Psychiatric:         Mood and Affect: Mood normal          Behavior: Behavior normal          Thought Content:  Thought content normal          Judgment: Judgment normal        Christine Coello PA-C

## 2023-08-04 ENCOUNTER — APPOINTMENT (OUTPATIENT)
Dept: LAB | Facility: HOSPITAL | Age: 43
End: 2023-08-04
Payer: COMMERCIAL

## 2023-08-04 DIAGNOSIS — E89.0 HYPOTHYROIDISM FOLLOWING RADIOIODINE THERAPY: ICD-10-CM

## 2023-08-04 DIAGNOSIS — I83.893 VARICOSE VEINS OF BOTH LEGS WITH EDEMA: ICD-10-CM

## 2023-08-04 DIAGNOSIS — E78.1 PRIMARY HYPERTRIGLYCERIDEMIA: ICD-10-CM

## 2023-08-04 DIAGNOSIS — D50.8 IRON DEFICIENCY ANEMIA SECONDARY TO INADEQUATE DIETARY IRON INTAKE: ICD-10-CM

## 2023-08-04 DIAGNOSIS — E87.6 HYPOKALEMIA: ICD-10-CM

## 2023-08-04 DIAGNOSIS — I10 ESSENTIAL HYPERTENSION: ICD-10-CM

## 2023-08-04 DIAGNOSIS — G47.33 OBSTRUCTIVE SLEEP APNEA TREATED WITH CONTINUOUS POSITIVE AIRWAY PRESSURE (CPAP): ICD-10-CM

## 2023-08-04 DIAGNOSIS — Z99.89 OBSTRUCTIVE SLEEP APNEA TREATED WITH CONTINUOUS POSITIVE AIRWAY PRESSURE (CPAP): ICD-10-CM

## 2023-08-04 LAB — TSH SERPL DL<=0.05 MIU/L-ACNC: 4.48 UIU/ML (ref 0.45–4.5)

## 2023-08-04 PROCEDURE — 36415 COLL VENOUS BLD VENIPUNCTURE: CPT

## 2023-08-04 PROCEDURE — 84443 ASSAY THYROID STIM HORMONE: CPT

## 2023-08-07 ENCOUNTER — OFFICE VISIT (OUTPATIENT)
Dept: FAMILY MEDICINE CLINIC | Facility: CLINIC | Age: 43
End: 2023-08-07

## 2023-08-07 VITALS
BODY MASS INDEX: 47.09 KG/M2 | OXYGEN SATURATION: 98 % | HEIGHT: 66 IN | WEIGHT: 293 LBS | TEMPERATURE: 98.3 F | SYSTOLIC BLOOD PRESSURE: 130 MMHG | HEART RATE: 77 BPM | RESPIRATION RATE: 16 BRPM | DIASTOLIC BLOOD PRESSURE: 80 MMHG

## 2023-08-07 DIAGNOSIS — E87.6 HYPOKALEMIA: ICD-10-CM

## 2023-08-07 DIAGNOSIS — M25.571 ACUTE RIGHT ANKLE PAIN: ICD-10-CM

## 2023-08-07 DIAGNOSIS — E89.0 HYPOTHYROIDISM FOLLOWING RADIOIODINE THERAPY: ICD-10-CM

## 2023-08-07 DIAGNOSIS — D50.8 IRON DEFICIENCY ANEMIA SECONDARY TO INADEQUATE DIETARY IRON INTAKE: ICD-10-CM

## 2023-08-07 DIAGNOSIS — E66.01 MORBID OBESITY WITH BODY MASS INDEX (BMI) OF 50.0 TO 59.9 IN ADULT (HCC): ICD-10-CM

## 2023-08-07 DIAGNOSIS — Z12.4 CERVICAL CANCER SCREENING: ICD-10-CM

## 2023-08-07 DIAGNOSIS — E78.1 PRIMARY HYPERTRIGLYCERIDEMIA: ICD-10-CM

## 2023-08-07 DIAGNOSIS — G47.33 OBSTRUCTIVE SLEEP APNEA TREATED WITH CONTINUOUS POSITIVE AIRWAY PRESSURE (CPAP): ICD-10-CM

## 2023-08-07 DIAGNOSIS — I83.893 VARICOSE VEINS OF BOTH LEGS WITH EDEMA: ICD-10-CM

## 2023-08-07 DIAGNOSIS — I10 ESSENTIAL HYPERTENSION: ICD-10-CM

## 2023-08-07 DIAGNOSIS — Z99.89 OBSTRUCTIVE SLEEP APNEA TREATED WITH CONTINUOUS POSITIVE AIRWAY PRESSURE (CPAP): ICD-10-CM

## 2023-08-07 DIAGNOSIS — Z00.00 WELL ADULT EXAM: Primary | ICD-10-CM

## 2023-08-07 RX ORDER — LEVOTHYROXINE SODIUM 175 UG/1
175 TABLET ORAL DAILY
Qty: 90 TABLET | Refills: 1 | Status: SHIPPED | OUTPATIENT
Start: 2023-08-07

## 2023-08-07 RX ORDER — HYDROCHLOROTHIAZIDE 25 MG/1
25 TABLET ORAL DAILY
Qty: 90 TABLET | Refills: 1 | Status: SHIPPED | OUTPATIENT
Start: 2023-08-07

## 2023-08-07 NOTE — PROGRESS NOTES
Name: Francisco Butt      : 1980      MRN: [id number]  Encounter Provider: Cheng Nuenz PA-C  Encounter Date: 2023   Encounter department: [address] [place] [address]     1. Well adult exam    2. Morbid obesity with body mass index (BMI) of 50.0 to 59.9 in adult ([address])    3. Hypothyroidism following radioiodine therapy  -     levothyroxine 175 mcg tablet; Take 1 tablet (175 mcg total) by mouth daily    4. Obstructive sleep apnea treated with continuous positive airway pressure (CPAP)    5. Varicose veins of both legs with edema    6. Essential hypertension  -     hydrochlorothiazide (HYDRODIURIL) 25 mg tablet; Take 1 tablet (25 mg total) by mouth daily    7. Primary hypertriglyceridemia    8. Iron deficiency anemia secondary to inadequate dietary iron intake    9. Hypokalemia    10. Cervical cancer screening  -     Ambulatory Referral to Gynecology; Future    11. Acute right ankle pain  -     XR foot 3+ vw right; Future; Expected date: 2023    Annual physical has been done as a separate visit today. - She went for blood work but did not realize that the  did not do all the labs ordered from March. TSH is normal at this time  - She will continue on her iron supplement/multivitamin  - Continue with iron rich foods  - Continue with foods with potassium  - Continue levothyroxine 175 mcg daily  - I did recommend follow-up in 5 months    M*Modal software was used to dictate this note. It may contain errors with dictating incorrect words/spelling. Please contact provider directly for any questions. Subjective      Patient presents today for routine follow-up. She states that she has been noticing pain in her right posterior ankle. More so when she has pressure on her ankle. Denies any injury. She states that she has been doing some stretches. She avoids wearing a closed shoe. She continues to hold lisinopril.   She states that her blood pressure has been under good control with the hydrochlorothiazide. Review of Systems   Constitutional: Negative. Respiratory: Negative for cough and shortness of breath. Cardiovascular: Negative for chest pain and leg swelling. Musculoskeletal:        As stated in HPI       Current Outpatient Medications on File Prior to Visit   Medication Sig   • [DISCONTINUED] hydrochlorothiazide (HYDRODIURIL) 25 mg tablet Take 1 tablet (25 mg total) by mouth daily   • [DISCONTINUED] levothyroxine 175 mcg tablet Take 1 tablet (175 mcg total) by mouth daily   • ibuprofen (MOTRIN) 800 mg tablet Take 800 mg by mouth every 6 to 8 hours as needed for pain (Patient not taking: Reported on 3/7/2023)   • Multiple Vitamin (multivitamin) capsule Take 1 capsule by mouth daily (Patient not taking: Reported on 8/7/2023)   • [DISCONTINUED] lisinopril (ZESTRIL) 10 mg tablet Take 1 tablet (10 mg total) by mouth daily (Patient not taking: Reported on 8/7/2023)       Objective     /80 (BP Location: Left arm, Patient Position: Sitting, Cuff Size: Large)   Pulse 77   Temp 98.3 °F (36.8 °C) (Tympanic)   Resp 16   Ht 5' 6" (1.676 m)   Wt (!) 141 kg (311 lb 9.6 oz)   SpO2 98%   BMI 50.29 kg/m²     Physical Exam  Vitals reviewed. Constitutional:       General: She is not in acute distress. Appearance: Normal appearance. She is well-developed. She is not ill-appearing, toxic-appearing or diaphoretic. HENT:      Head: Normocephalic and atraumatic. Neck:      Thyroid: No thyromegaly. Cardiovascular:      Rate and Rhythm: Normal rate and regular rhythm. Heart sounds: Normal heart sounds. No murmur heard. No friction rub. No gallop. Pulmonary:      Effort: Pulmonary effort is normal. No respiratory distress. Breath sounds: Normal breath sounds. No wheezing, rhonchi or rales. Abdominal:      General: Bowel sounds are normal.      Palpations: Abdomen is soft. There is no mass. Tenderness: There is no abdominal tenderness. Musculoskeletal:         General: No deformity. Cervical back: Neck supple. Right lower leg: No edema. Left lower leg: No edema. Lymphadenopathy:      Cervical: No cervical adenopathy. Skin:     General: Skin is warm. Neurological:      General: No focal deficit present. Mental Status: She is alert. Psychiatric:         Mood and Affect: Mood normal.         Behavior: Behavior normal.         Thought Content:  Thought content normal.         Judgment: Judgment normal.       Christine Coello PA-C

## 2023-08-07 NOTE — PROGRESS NOTES
Name: Kindra Butt      : 1980      MRN: [id number]  Encounter Provider: Mc Reveles PA-C  Encounter Date: 2023   Encounter department: [address] [place] [address]     1. Well adult exam    2. Morbid obesity with body mass index (BMI) of 50.0 to 59.9 in adult [facility])      -She has not seen a dentist in over a year. I did recommend she see the dentist upstairs  - She has been losing some weight by changing her diet. I did encourage walking at least 15 minutes a day  -Mammogram ordered  -Refer to gynecology for screening Pap smear  - Routine physical in 1 year    M*Modal software was used to dictate this note. It may contain errors with dictating incorrect words/spelling. Please contact provider directly for any questions. Subjective      Patient presents today for routine physical.  Her other concerns will be addressed as a separate visit. She has not seen a dentist in over a year  She does wear glasses and sees the eye doctor yearly  She has been eating more veggies which is because some of her weight loss. She does not exercise  Denies any smoking, alcohol, drug use or vaping  Due for her mammogram  Due for her Pap smear    Review of Systems   Constitutional: Negative. Respiratory: Negative for cough and shortness of breath. Cardiovascular: Negative for chest pain and leg swelling.    Musculoskeletal:        As stated in HPI       Current Outpatient Medications on File Prior to Visit   Medication Sig   • hydrochlorothiazide (HYDRODIURIL) 25 mg tablet Take 1 tablet (25 mg total) by mouth daily   • levothyroxine 175 mcg tablet Take 1 tablet (175 mcg total) by mouth daily   • ibuprofen (MOTRIN) 800 mg tablet Take 800 mg by mouth every 6 to 8 hours as needed for pain (Patient not taking: Reported on 3/7/2023)   • lisinopril (ZESTRIL) 10 mg tablet Take 1 tablet (10 mg total) by mouth daily (Patient not taking: Reported on 2023) • Multiple Vitamin (multivitamin) capsule Take 1 capsule by mouth daily (Patient not taking: Reported on 8/7/2023)       Objective     /80 (BP Location: Left arm, Patient Position: Sitting, Cuff Size: Large)   Pulse 77   Temp 98.3 °F (36.8 °C) (Tympanic)   Resp 16   Ht 5' 6" (1.676 m)   Wt (!) 141 kg (311 lb 9.6 oz)   SpO2 98%   BMI 50.29 kg/m²     Physical Exam  Vitals reviewed. Constitutional:       General: She is not in acute distress. Appearance: Normal appearance. She is well-developed. She is not ill-appearing, toxic-appearing or diaphoretic. HENT:      Head: Normocephalic and atraumatic. Neck:      Thyroid: No thyromegaly. Cardiovascular:      Rate and Rhythm: Normal rate and regular rhythm. Heart sounds: Normal heart sounds. No murmur heard. No friction rub. No gallop. Pulmonary:      Effort: Pulmonary effort is normal. No respiratory distress. Breath sounds: Normal breath sounds. No wheezing, rhonchi or rales. Abdominal:      General: Bowel sounds are normal.      Palpations: Abdomen is soft. There is no mass. Tenderness: There is no abdominal tenderness. Musculoskeletal:         General: No deformity. Cervical back: Neck supple. Right lower leg: No edema. Left lower leg: No edema. Lymphadenopathy:      Cervical: No cervical adenopathy. Skin:     General: Skin is warm. Neurological:      General: No focal deficit present. Mental Status: She is alert. Psychiatric:         Mood and Affect: Mood normal.         Behavior: Behavior normal.         Thought Content:  Thought content normal.         Judgment: Judgment normal.       Christine Coello PA-C

## 2023-08-11 NOTE — RESULT ENCOUNTER NOTE
----- Message from Sharron Medrano MA sent at 8/11/2023 10:50 AM EDT -----  Regarding: FW: Dosage   Contact: [phone]  Please advise   ----- Message -----  From: Pretty Devine  Sent: 8/11/2023  10:42 AM EDT  To: [facility] Clinical Support Staff  Subject: Dosage                                           Any follow up? It’s been a week with no response      Current potassium level is good at this time  The levels at 3 7, normal is 3 5-5 5   I did send a message through [address]

## 2023-09-01 ENCOUNTER — HOSPITAL ENCOUNTER (OUTPATIENT)
Dept: RADIOLOGY | Facility: HOSPITAL | Age: 43
End: 2023-09-01
Payer: COMMERCIAL

## 2023-09-01 ENCOUNTER — APPOINTMENT (OUTPATIENT)
Dept: LAB | Facility: HOSPITAL | Age: 43
End: 2023-09-01
Payer: COMMERCIAL

## 2023-09-01 DIAGNOSIS — M25.571 ACUTE RIGHT ANKLE PAIN: ICD-10-CM

## 2023-09-01 LAB
ALBUMIN SERPL BCP-MCNC: 4.1 G/DL (ref 3.5–5)
ALP SERPL-CCNC: 76 U/L (ref 34–104)
ALT SERPL W P-5'-P-CCNC: 7 U/L (ref 7–52)
ANION GAP SERPL CALCULATED.3IONS-SCNC: 8 MMOL/L
AST SERPL W P-5'-P-CCNC: 12 U/L (ref 13–39)
BASOPHILS # BLD AUTO: 0.02 THOUSANDS/ÂΜL (ref 0–0.1)
BASOPHILS NFR BLD AUTO: 0 % (ref 0–1)
BILIRUB SERPL-MCNC: 0.62 MG/DL (ref 0.2–1)
BUN SERPL-MCNC: 11 MG/DL (ref 5–25)
CALCIUM SERPL-MCNC: 8.6 MG/DL (ref 8.4–10.2)
CHLORIDE SERPL-SCNC: 102 MMOL/L (ref 96–108)
CHOLEST SERPL-MCNC: 151 MG/DL
CO2 SERPL-SCNC: 27 MMOL/L (ref 21–32)
CREAT SERPL-MCNC: 0.78 MG/DL (ref 0.6–1.3)
EOSINOPHIL # BLD AUTO: 0.17 THOUSAND/ÂΜL (ref 0–0.61)
EOSINOPHIL NFR BLD AUTO: 3 % (ref 0–6)
ERYTHROCYTE [DISTWIDTH] IN BLOOD BY AUTOMATED COUNT: 19.6 % (ref 11.6–15.1)
GFR SERPL CREATININE-BSD FRML MDRD: 94 ML/MIN/1.73SQ M
GLUCOSE P FAST SERPL-MCNC: 87 MG/DL (ref 65–99)
HCT VFR BLD AUTO: 33.7 % (ref 34.8–46.1)
HDLC SERPL-MCNC: 50 MG/DL
HGB BLD-MCNC: 9.2 G/DL (ref 11.5–15.4)
IMM GRANULOCYTES # BLD AUTO: 0.01 THOUSAND/UL (ref 0–0.2)
IMM GRANULOCYTES NFR BLD AUTO: 0 % (ref 0–2)
LDLC SERPL CALC-MCNC: 80 MG/DL (ref 0–100)
LYMPHOCYTES # BLD AUTO: 1.03 THOUSANDS/ÂΜL (ref 0.6–4.47)
LYMPHOCYTES NFR BLD AUTO: 19 % (ref 14–44)
MCH RBC QN AUTO: 19.7 PG (ref 26.8–34.3)
MCHC RBC AUTO-ENTMCNC: 27.3 G/DL (ref 31.4–37.4)
MCV RBC AUTO: 72 FL (ref 82–98)
MONOCYTES # BLD AUTO: 0.38 THOUSAND/ÂΜL (ref 0.17–1.22)
MONOCYTES NFR BLD AUTO: 7 % (ref 4–12)
NEUTROPHILS # BLD AUTO: 3.85 THOUSANDS/ÂΜL (ref 1.85–7.62)
NEUTS SEG NFR BLD AUTO: 71 % (ref 43–75)
NONHDLC SERPL-MCNC: 101 MG/DL
NRBC BLD AUTO-RTO: 0 /100 WBCS
PLATELET # BLD AUTO: 344 THOUSANDS/UL (ref 149–390)
PMV BLD AUTO: 10.3 FL (ref 8.9–12.7)
POTASSIUM SERPL-SCNC: 3.8 MMOL/L (ref 3.5–5.3)
PROT SERPL-MCNC: 7.1 G/DL (ref 6.4–8.4)
RBC # BLD AUTO: 4.67 MILLION/UL (ref 3.81–5.12)
SODIUM SERPL-SCNC: 137 MMOL/L (ref 135–147)
TRIGL SERPL-MCNC: 107 MG/DL
WBC # BLD AUTO: 5.46 THOUSAND/UL (ref 4.31–10.16)

## 2023-09-01 PROCEDURE — 73630 X-RAY EXAM OF FOOT: CPT

## 2023-09-05 DIAGNOSIS — D50.8 IRON DEFICIENCY ANEMIA SECONDARY TO INADEQUATE DIETARY IRON INTAKE: Primary | ICD-10-CM

## 2023-09-11 DIAGNOSIS — M25.571 ACUTE RIGHT ANKLE PAIN: Primary | ICD-10-CM

## 2023-09-11 NOTE — RESULT ENCOUNTER NOTE
Please let her know that the x-ray of her ankle does show a spur at the back of the heel bone. There is some obvious soft tissue swelling. They suspect Achilles tendinitis. I would recommend physical therapy and a referral to podiatry. I will put orders in the system.   Thank you

## 2023-09-18 NOTE — PROGRESS NOTES
A/P    1. Annual exam    Last PAP - ?? Next due today with co testing    Scheduling of pap discussed in detail      Mammogram - last 6/26/2018- #2   Next do ordered 3 x not completed yet    Self breast exams discussed    2. Mother is BRCA 1 +   Maternal family     Grandmom, 2 cousins, aunt all breast cancer   Genetics onc    Mammogram stresses. 44 y.o.,No LMP recorded. C/O none       Past medical / social / surgical / family history reviewed and updated   Medication and allergies discussed in detail and updated     Review of Systems - History obtained from chart review and the patient  General ROS: negative  Psychological ROS: negative  Ophthalmic ROS: negative  ENT ROS: negative  Allergy and Immunology ROS: negative  Hematological and Lymphatic ROS: negative  Endocrine ROS: negative  Breast ROS: negative for breast lumps  Respiratory ROS: no cough, shortness of breath, or wheezing  Cardiovascular ROS: no chest pain or dyspnea on exertion  Gastrointestinal ROS: no abdominal pain, change in bowel habits, or black or bloody stools  Genito-Urinary ROS: no dysuria, trouble voiding, or hematuria  Musculoskeletal ROS: negative  Neurological ROS: no TIA or stroke symptoms  Dermatological ROS: negative    Ht 5' 6" (1.676 m)   Wt (!) 141 kg (310 lb 12.8 oz)   LMP 09/06/2023 (Exact Date)   BMI 50.16 kg/m²         Physical Exam  Vitals reviewed. Constitutional:       Appearance: She is well-developed. Neck:      Thyroid: No thyromegaly. Cardiovascular:      Rate and Rhythm: Normal rate. Heart sounds: Normal heart sounds. Pulmonary:      Effort: Pulmonary effort is normal. No accessory muscle usage or respiratory distress. Chest:      Chest wall: No tenderness. Breasts:     Breasts are symmetrical.      Right: No inverted nipple, mass or tenderness. Left: No inverted nipple, mass or tenderness. Abdominal:      General: There is no distension. Palpations: Abdomen is soft. Tenderness: There is no abdominal tenderness. There is no guarding or rebound. Genitourinary:     Exam position: Supine. Labia:         Right: No rash, tenderness, lesion or injury. Left: No rash, tenderness, lesion or injury. Vagina: Normal. No foreign body. No vaginal discharge or bleeding. Cervix: No cervical motion tenderness or discharge. Uterus: Not enlarged and not fixed. Adnexa:         Right: No mass, tenderness or fullness. Left: No mass, tenderness or fullness. Rectum: No external hemorrhoid. Musculoskeletal:      Cervical back: Normal range of motion. Lymphadenopathy:      Cervical: No cervical adenopathy. Upper Body:      Right upper body: No supraclavicular adenopathy. Left upper body: No supraclavicular adenopathy. Neurological:      Mental Status: She is alert and oriented to person, place, and time. Psychiatric:         Speech: Speech normal.         Behavior: Behavior normal.         Thought Content:  Thought content normal.         Judgment: Judgment normal.

## 2023-09-20 ENCOUNTER — OFFICE VISIT (OUTPATIENT)
Dept: OBGYN CLINIC | Facility: MEDICAL CENTER | Age: 43
End: 2023-09-20
Payer: COMMERCIAL

## 2023-09-20 ENCOUNTER — TELEPHONE (OUTPATIENT)
Dept: HEMATOLOGY ONCOLOGY | Facility: CLINIC | Age: 43
End: 2023-09-20

## 2023-09-20 VITALS — BODY MASS INDEX: 47.09 KG/M2 | WEIGHT: 293 LBS | HEIGHT: 66 IN

## 2023-09-20 DIAGNOSIS — Z84.81 FAMILY HISTORY OF BREAST CANCER GENE MUTATION IN FIRST DEGREE RELATIVE: ICD-10-CM

## 2023-09-20 DIAGNOSIS — Z12.4 ENCOUNTER FOR SCREENING FOR CERVICAL CANCER: Primary | ICD-10-CM

## 2023-09-20 DIAGNOSIS — Z12.4 CERVICAL CANCER SCREENING: ICD-10-CM

## 2023-09-20 PROCEDURE — S0610 ANNUAL GYNECOLOGICAL EXAMINA: HCPCS | Performed by: OBSTETRICS & GYNECOLOGY

## 2023-09-20 PROCEDURE — G0476 HPV COMBO ASSAY CA SCREEN: HCPCS | Performed by: OBSTETRICS & GYNECOLOGY

## 2023-09-20 PROCEDURE — G0145 SCR C/V CYTO,THINLAYER,RESCR: HCPCS | Performed by: OBSTETRICS & GYNECOLOGY

## 2023-09-20 RX ORDER — ASCORBIC ACID 500 MG
500 TABLET ORAL DAILY
COMMUNITY
Start: 2023-09-07 | End: 2024-09-06

## 2023-09-20 RX ORDER — FERROUS SULFATE 325(65) MG
325 TABLET ORAL
COMMUNITY
Start: 2023-09-07 | End: 2024-09-06

## 2023-09-20 RX ORDER — ERGOCALCIFEROL 1.25 MG/1
50000 CAPSULE ORAL WEEKLY
COMMUNITY
Start: 2023-09-07

## 2023-09-20 NOTE — TELEPHONE ENCOUNTER
I spoke with Gi Worrell to schedule their consultation with Cancer Risk and Genetics. Scheduling Outcome: Patient is scheduled for an appointment on 3/6/24 at 11am with Christopher Luevano    Personal/Family History Related to Appointment:     Personal History of Cancer: Patient reports no personal history of cancer    Family History of Cancer: Patient reports family history of mother: breast ca /m aunt: Breast ca/ m cousin  breast ca    Is patient of [address] [place] [address],  [address]?: No    History of Genetic Testing:  Personal History of Genetic Testing: Patient report no personal history of Genetic Testing. Family History of Genetic Testing: Patient reports that member(s) of their family have had Genetic Testing. Details: Mother positive     Progeny:  Is patient able to complete our family history questionnaire on a computer?:  Yes    Patient's preferred e-mail address: [email or web address]

## 2023-09-21 LAB
HPV HR 12 DNA CVX QL NAA+PROBE: NEGATIVE
HPV16 DNA CVX QL NAA+PROBE: NEGATIVE
HPV18 DNA CVX QL NAA+PROBE: NEGATIVE

## 2023-09-29 LAB
LAB AP GYN PRIMARY INTERPRETATION: NORMAL
Lab: NORMAL

## 2023-10-11 ENCOUNTER — APPOINTMENT (OUTPATIENT)
Dept: LAB | Facility: HOSPITAL | Age: 43
End: 2023-10-11
Payer: COMMERCIAL

## 2023-10-11 LAB
FERRITIN SERPL-MCNC: 9 NG/ML (ref 11–307)
IRON SATN MFR SERPL: 10 % (ref 15–50)
IRON SERPL-MCNC: 38 UG/DL (ref 50–212)
TIBC SERPL-MCNC: 369 UG/DL (ref 250–450)
UIBC SERPL-MCNC: 331 UG/DL (ref 155–355)

## 2023-10-13 DIAGNOSIS — D50.8 IRON DEFICIENCY ANEMIA SECONDARY TO INADEQUATE DIETARY IRON INTAKE: Primary | ICD-10-CM

## 2023-11-06 ENCOUNTER — APPOINTMENT (OUTPATIENT)
Dept: LAB | Facility: HOSPITAL | Age: 43
End: 2023-11-06
Payer: COMMERCIAL

## 2023-11-06 DIAGNOSIS — D50.8 IRON DEFICIENCY ANEMIA SECONDARY TO INADEQUATE DIETARY IRON INTAKE: ICD-10-CM

## 2023-11-06 DIAGNOSIS — E66.01 MORBID OBESITY (HCC): ICD-10-CM

## 2023-11-06 DIAGNOSIS — E55.9 AVITAMINOSIS D: ICD-10-CM

## 2023-11-06 DIAGNOSIS — E89.0 POSTSURGICAL HYPOTHYROIDISM: ICD-10-CM

## 2023-11-06 DIAGNOSIS — Z76.89 MENSTRUAL EXTRACTION: ICD-10-CM

## 2023-11-06 DIAGNOSIS — Z83.3 FAMILY HISTORY OF DIABETES MELLITUS: ICD-10-CM

## 2023-11-06 LAB
25(OH)D3 SERPL-MCNC: 25.2 NG/ML (ref 30–100)
ALBUMIN SERPL BCP-MCNC: 4.2 G/DL (ref 3.5–5)
ALP SERPL-CCNC: 73 U/L (ref 34–104)
ALT SERPL W P-5'-P-CCNC: 8 U/L (ref 7–52)
ANION GAP SERPL CALCULATED.3IONS-SCNC: 9 MMOL/L
AST SERPL W P-5'-P-CCNC: 12 U/L (ref 13–39)
BASOPHILS # BLD AUTO: 0.02 THOUSANDS/ÂΜL (ref 0–0.1)
BASOPHILS NFR BLD AUTO: 0 % (ref 0–1)
BILIRUB SERPL-MCNC: 0.67 MG/DL (ref 0.2–1)
BUN SERPL-MCNC: 12 MG/DL (ref 5–25)
CALCIUM SERPL-MCNC: 9.2 MG/DL (ref 8.4–10.2)
CHLORIDE SERPL-SCNC: 100 MMOL/L (ref 96–108)
CHOLEST SERPL-MCNC: 141 MG/DL
CO2 SERPL-SCNC: 29 MMOL/L (ref 21–32)
CREAT SERPL-MCNC: 0.63 MG/DL (ref 0.6–1.3)
EOSINOPHIL # BLD AUTO: 0.18 THOUSAND/ÂΜL (ref 0–0.61)
EOSINOPHIL NFR BLD AUTO: 4 % (ref 0–6)
ERYTHROCYTE [DISTWIDTH] IN BLOOD BY AUTOMATED COUNT: 22 % (ref 11.6–15.1)
EST. AVERAGE GLUCOSE BLD GHB EST-MCNC: 108 MG/DL
FERRITIN SERPL-MCNC: 21 NG/ML (ref 11–307)
GFR SERPL CREATININE-BSD FRML MDRD: 110 ML/MIN/1.73SQ M
GLUCOSE P FAST SERPL-MCNC: 98 MG/DL (ref 65–99)
HBA1C MFR BLD: 5.4 %
HCT VFR BLD AUTO: 40.5 % (ref 34.8–46.1)
HDLC SERPL-MCNC: 46 MG/DL
HGB BLD-MCNC: 11.8 G/DL (ref 11.5–15.4)
IMM GRANULOCYTES # BLD AUTO: 0.01 THOUSAND/UL (ref 0–0.2)
IMM GRANULOCYTES NFR BLD AUTO: 0 % (ref 0–2)
IRON SATN MFR SERPL: 12 % (ref 15–50)
IRON SERPL-MCNC: 39 UG/DL (ref 50–212)
LDLC SERPL CALC-MCNC: 73 MG/DL (ref 0–100)
LYMPHOCYTES # BLD AUTO: 0.97 THOUSANDS/ÂΜL (ref 0.6–4.47)
LYMPHOCYTES NFR BLD AUTO: 19 % (ref 14–44)
MCH RBC QN AUTO: 23.3 PG (ref 26.8–34.3)
MCHC RBC AUTO-ENTMCNC: 29.1 G/DL (ref 31.4–37.4)
MCV RBC AUTO: 80 FL (ref 82–98)
MONOCYTES # BLD AUTO: 0.28 THOUSAND/ÂΜL (ref 0.17–1.22)
MONOCYTES NFR BLD AUTO: 6 % (ref 4–12)
NEUTROPHILS # BLD AUTO: 3.53 THOUSANDS/ÂΜL (ref 1.85–7.62)
NEUTS SEG NFR BLD AUTO: 71 % (ref 43–75)
NONHDLC SERPL-MCNC: 95 MG/DL
NRBC BLD AUTO-RTO: 0 /100 WBCS
PLATELET # BLD AUTO: 290 THOUSANDS/UL (ref 149–390)
PMV BLD AUTO: 10 FL (ref 8.9–12.7)
POTASSIUM SERPL-SCNC: 3.1 MMOL/L (ref 3.5–5.3)
PROT SERPL-MCNC: 7.2 G/DL (ref 6.4–8.4)
RBC # BLD AUTO: 5.07 MILLION/UL (ref 3.81–5.12)
SODIUM SERPL-SCNC: 138 MMOL/L (ref 135–147)
T4 FREE SERPL-MCNC: 1.16 NG/DL (ref 0.61–1.12)
TIBC SERPL-MCNC: 336 UG/DL (ref 250–450)
TRIGL SERPL-MCNC: 108 MG/DL
TSH SERPL DL<=0.05 MIU/L-ACNC: 0.4 UIU/ML (ref 0.45–4.5)
UIBC SERPL-MCNC: 297 UG/DL (ref 155–355)
WBC # BLD AUTO: 4.99 THOUSAND/UL (ref 4.31–10.16)

## 2023-11-06 PROCEDURE — 80053 COMPREHEN METABOLIC PANEL: CPT

## 2023-11-06 PROCEDURE — 85025 COMPLETE CBC W/AUTO DIFF WBC: CPT

## 2023-11-06 PROCEDURE — 84443 ASSAY THYROID STIM HORMONE: CPT

## 2023-11-06 PROCEDURE — 83036 HEMOGLOBIN GLYCOSYLATED A1C: CPT

## 2023-11-06 PROCEDURE — 80061 LIPID PANEL: CPT

## 2023-11-06 PROCEDURE — 84439 ASSAY OF FREE THYROXINE: CPT

## 2023-11-06 PROCEDURE — 36415 COLL VENOUS BLD VENIPUNCTURE: CPT

## 2023-11-06 PROCEDURE — 82306 VITAMIN D 25 HYDROXY: CPT

## 2023-11-09 ENCOUNTER — TELEPHONE (OUTPATIENT)
Dept: HEMATOLOGY ONCOLOGY | Facility: CLINIC | Age: 43
End: 2023-11-09

## 2023-11-09 DIAGNOSIS — D50.8 IRON DEFICIENCY ANEMIA SECONDARY TO INADEQUATE DIETARY IRON INTAKE: Primary | ICD-10-CM

## 2023-11-09 NOTE — TELEPHONE ENCOUNTER
Appointment Schedule   Who are you speaking with? Patient   If it is not the patient, are they listed on an active communication consent form? N/A   Which provider is the appointment scheduled with? Bhavana Marte PA-C   At which location is the appointment scheduled for? [facility]   When is the appointment scheduled? Please list date and time 1/4/24 @ 9   What is the reason for this appointment? Iron deficiency anemia secondary to inadequate dietary iron intake    Did patient voice understanding of the details of this appointment? Yes   Was the no show policy reviewed with patient?  Yes

## 2024-01-04 ENCOUNTER — APPOINTMENT (OUTPATIENT)
Dept: LAB | Facility: HOSPITAL | Age: 44
End: 2024-01-04
Payer: COMMERCIAL

## 2024-01-04 ENCOUNTER — OFFICE VISIT (OUTPATIENT)
Dept: HEMATOLOGY ONCOLOGY | Facility: CLINIC | Age: 44
End: 2024-01-04
Payer: COMMERCIAL

## 2024-01-04 VITALS
TEMPERATURE: 96.9 F | DIASTOLIC BLOOD PRESSURE: 78 MMHG | HEART RATE: 100 BPM | SYSTOLIC BLOOD PRESSURE: 134 MMHG | BODY MASS INDEX: 47.09 KG/M2 | WEIGHT: 293 LBS | HEIGHT: 66 IN | RESPIRATION RATE: 18 BRPM | OXYGEN SATURATION: 97 %

## 2024-01-04 DIAGNOSIS — Z84.81 FAMILY HISTORY OF BREAST CANCER GENE MUTATION IN FIRST DEGREE RELATIVE: ICD-10-CM

## 2024-01-04 DIAGNOSIS — D50.0 IRON DEFICIENCY ANEMIA DUE TO CHRONIC BLOOD LOSS: Primary | ICD-10-CM

## 2024-01-04 DIAGNOSIS — N92.1 MENORRHAGIA WITH IRREGULAR CYCLE: ICD-10-CM

## 2024-01-04 DIAGNOSIS — D50.0 IRON DEFICIENCY ANEMIA DUE TO CHRONIC BLOOD LOSS: ICD-10-CM

## 2024-01-04 LAB
BASOPHILS # BLD AUTO: 0.03 THOUSANDS/ÂΜL (ref 0–0.1)
BASOPHILS NFR BLD AUTO: 1 % (ref 0–1)
EOSINOPHIL # BLD AUTO: 0.17 THOUSAND/ÂΜL (ref 0–0.61)
EOSINOPHIL NFR BLD AUTO: 3 % (ref 0–6)
ERYTHROCYTE [DISTWIDTH] IN BLOOD BY AUTOMATED COUNT: 15.4 % (ref 11.6–15.1)
FERRITIN SERPL-MCNC: 11 NG/ML (ref 11–307)
FOLATE SERPL-MCNC: 10.9 NG/ML
HCT VFR BLD AUTO: 39.1 % (ref 34.8–46.1)
HGB BLD-MCNC: 12 G/DL (ref 11.5–15.4)
IMM GRANULOCYTES # BLD AUTO: 0 THOUSAND/UL (ref 0–0.2)
IMM GRANULOCYTES NFR BLD AUTO: 0 % (ref 0–2)
IRON SATN MFR SERPL: 12 % (ref 15–50)
IRON SERPL-MCNC: 44 UG/DL (ref 50–212)
LYMPHOCYTES # BLD AUTO: 1.23 THOUSANDS/ÂΜL (ref 0.6–4.47)
LYMPHOCYTES NFR BLD AUTO: 24 % (ref 14–44)
MCH RBC QN AUTO: 25.4 PG (ref 26.8–34.3)
MCHC RBC AUTO-ENTMCNC: 30.7 G/DL (ref 31.4–37.4)
MCV RBC AUTO: 83 FL (ref 82–98)
MONOCYTES # BLD AUTO: 0.44 THOUSAND/ÂΜL (ref 0.17–1.22)
MONOCYTES NFR BLD AUTO: 9 % (ref 4–12)
NEUTROPHILS # BLD AUTO: 3.3 THOUSANDS/ÂΜL (ref 1.85–7.62)
NEUTS SEG NFR BLD AUTO: 63 % (ref 43–75)
NRBC BLD AUTO-RTO: 0 /100 WBCS
PLATELET # BLD AUTO: 252 THOUSANDS/UL (ref 149–390)
PMV BLD AUTO: 10.3 FL (ref 8.9–12.7)
RBC # BLD AUTO: 4.73 MILLION/UL (ref 3.81–5.12)
TIBC SERPL-MCNC: 381 UG/DL (ref 250–450)
UIBC SERPL-MCNC: 337 UG/DL (ref 155–355)
VIT B12 SERPL-MCNC: 78 PG/ML (ref 180–914)
WBC # BLD AUTO: 5.17 THOUSAND/UL (ref 4.31–10.16)

## 2024-01-04 PROCEDURE — 82607 VITAMIN B-12: CPT

## 2024-01-04 PROCEDURE — 36415 COLL VENOUS BLD VENIPUNCTURE: CPT

## 2024-01-04 PROCEDURE — 82728 ASSAY OF FERRITIN: CPT

## 2024-01-04 PROCEDURE — 82746 ASSAY OF FOLIC ACID SERUM: CPT

## 2024-01-04 PROCEDURE — 83550 IRON BINDING TEST: CPT

## 2024-01-04 PROCEDURE — 83540 ASSAY OF IRON: CPT

## 2024-01-04 PROCEDURE — 99244 OFF/OP CNSLTJ NEW/EST MOD 40: CPT | Performed by: PHYSICIAN ASSISTANT

## 2024-01-04 RX ORDER — SODIUM CHLORIDE 9 MG/ML
20 INJECTION, SOLUTION INTRAVENOUS ONCE
OUTPATIENT
Start: 2024-01-18

## 2024-01-04 RX ORDER — PHENTERMINE HYDROCHLORIDE 37.5 MG/1
TABLET ORAL
COMMUNITY
Start: 2023-12-22

## 2024-01-04 NOTE — PATIENT INSTRUCTIONS
[facility]  [place] - [phone]    Your Team Members:  Advanced Practitioner:  Geetha Wise PA-C  Oncology Nurse:   EVITA Garza ([phone]) M-F 8am - 4:30pm    Please answer Private and Unavailable Calls - this may be your team(s) contacting you.  If you have medical questions/concerns/issues - contact us either by (1) My Chart (2) [facility]     Iron Rich Diet   WHAT YOU NEED TO KNOW:   An iron-rich diet includes foods that are good sources of iron. People need extra iron during childhood, adolescence (teenage years), and pregnancy. Iron is a mineral that your body needs to make hemoglobin. Hemoglobin is part of your blood and helps carry oxygen from your lungs to the rest of your body. Eat iron-rich foods and vitamin C every day to prevent iron deficiency anemia. Iron deficiency anemia can lead to other health problems in adults and growth or development problems in children.  DISCHARGE INSTRUCTIONS:   Daily iron needs:   Males:      1 to 3 years old: 7 mg    4 to 8 years old: 10 mg    9 to 13 years old: 8 mg    14 to 18 years old: 11 mg    19 years and older: 8 mg    Females:      1 to 3 years old: 7 mg    4 to 8 years old: 10 mg    9 to 13 years old: 8 mg    14 to 18 years old: 15 mg    19 to 50 years: 18 mg    Over 51 years old: 8 mg    Pregnant women:  27 mg    Foods that contain iron:   Meat, fish, and poultry are good sources of iron. They contain heme iron, a form of iron that your body absorbs very well. Fruit, vegetables, eggs, and grains such as pasta, rice, and cereal also contain iron. They contain nonheme iron, a form of iron that is not absorbed as well as heme iron. You can absorb more iron from these foods by eating a food that is high in vitamin C at the same time. You can also absorb more nonheme iron by eating a food from the meat, fish, and poultry group at the same time.    Fish and shellfish contain some mercury, a metal that can be harmful to the  body. Children and unborn babies are at higher risk for harm caused by mercury. Children and pregnant women should avoid eating fish high in mercury, such as shark and swordfish. They should also eat only fish that are lower in mercury, such as salmon, canned light tuna, and catfish. Limit the amount of low-mercury fish and shellfish you eat to less than 12 ounces per week.    Iron-rich foods:   Foods that contain 2 mg or more per serving:      3 ounces of cooked beef (laura, eye of round) or cooked turkey (dark meat)    ½ cup of beans (black, kidney, or lentil, or soybeans)    ½ cup of tofu    1 medium baked potato    1 cup of cooked artichoke or cooked spinach    ¾ cup of instant oatmeal    1 cup of corn flakes    Foods that contain 1 to 2 mg per serving:      3 ounces of chicken    3 ounces of pork    3 ounces of turkey (light meat)    3 ounces of light tuna    ½ cup of seedless, packed raisins    1 slice of whole-wheat or white bread       Good sources of vitamin C:  Eat a serving of vitamin C with any iron-rich food to help your body absorb more iron. The following fruits and vegetables are good sources of vitamin C:  1 cup of fresh orange juice (124 mg) or pink grapefruit juice (83 mg)    1 cup of strawberries (106 mg)    1 cup of diced cantaloupe (68 mg)    1 cup of sweet yellow pepper (283 mg)    1 cup of fresh, boiled broccoli (116 mg) or cooked brussels sprouts (97 mg)    1 cup of kale (53 mg)    1 cup of tomato juice (45 mg)       Other guidelines to follow:   Tea and coffee can decrease the amount of iron that your body absorbs from iron-rich foods. Drink coffee and tea separately from meals that contain iron-rich foods.    Have foods and liquids high in calcium separately from iron-rich foods. Calcium prevents iron from being absorbed. Cow's milk and products made from it, such as cheese and yogurt, are high in calcium. Children older than 1 year only need about 24 ounces of cow's milk each day. Other  foods high in calcium include leafy greens, green vegetables, almonds, and canned sardines.       © Copyright [employer] 2023 Information is for End User's use only and may not be sold, redistributed or otherwise used for commercial purposes.  The above information is an  only. It is not intended as medical advice for individual conditions or treatments. Talk to your doctor, nurse or pharmacist before following any medical regimen to see if it is safe and effective for you.

## 2024-01-04 NOTE — PROGRESS NOTES
[address]  [facility]AT[facility] [place]  [address]  [facility] [address]  Hematology Ambulatory Consult  Maritza DonatoZairedanitza, 1980, [id number]  1/4/2024      Assessment and Plan   1. Iron deficiency anemia due to chronic blood loss  This is a 43-year-old female with past medical history of iron deficiency anemia who underwent oral iron supplementation and has not demonstrated significant increase in ferritin.  Moreover the patient is having some stomach upset related to oral iron supplementation.    IV iron was recommended.   Two types of IV iron were discussed: Venofer and Feraheme.  Patient understands dosing and schedule differences between the two medications.  If Feraheme is not covered by insurance, we will automatically supplement with Venofer 200 mg IV x 7 doses.      We discussed potential side effects which include but are not limited to IV site reactions, tattooing, hypotension, arthralgias, headache, nausea, and anaphylaxis.  If patient experiences any side effects they are to call the office to discuss premedications are necessary for subsequent dosing.      Etiology of iron deficiency is likely connected to heavy menses see below.  Etiology workup will consist of urinalysis and stool sampling after the patient has completed menses for that cycle.  Patient is 46, no reported symptoms that would require immediate referral to GI.  Patient's heavy menses should be addressed by GYN.  Patient has been iron deficient/anemic for quite some time-laboratory assessment back to 2018 was consistent with iron deficiency anemia.    - Ambulatory referral to Hematology / Oncology  - CBC and differential; Future  - Iron Panel (Includes Ferritin, Iron Sat%, Iron, and TIBC); Future  - Occult Blood, Fecal Immunochemical; Future  - Vitamin B12; Future  - Folate; Future  - UA w Reflex to Microscopic w Reflex to Culture; Future  - CBC and differential; Future  - Vitamin B12;  Future  - Iron Panel (Includes Ferritin, Iron Sat%, Iron, and TIBC); Future  - Folate; Future    2. Menorrhagia with irregular cycle  As above.  Recommend follow-up with GYN.    3. Family history of breast cancer gene mutation in first degree relative  BRCA1 in Mother.    Oncology genetics follow-up in March 2024.  Encouraged follow-up with mammography.  Patient has prescription just has not scheduled.  Offered to help and declined today.     The patient is scheduled for follow-up in approximately 3.5 months.     Patient voiced agreement and understanding to the above.   Patient knows to call the Hematology/Oncology office with any questions and concerns regarding the above.    Barrier(s) to care: None.   The patient is able to self care.    Geetha Wise PA-C  Medical Oncology/Hematology  [facility]    Subjective     Chief Complaint   Patient presents with    Consult       Referring provider    Christine Coello PA-C  [address] [place] [address]    History of present illness:   This is a 43-year-old female with past medical history of obesity, depression, hypertension, postsurgical hypothyroidism following with endocrinology who presents to the hematology clinic for evaluation of iron deficiency anemia.    4/4/2017     WBC 7.28, hemoglobin 13.1, MCV 83, RDW 16.7, platelet 245  12/29/2018 WBC 6.0, hemoglobin 11.3, MCV 79, RDW 16.6, platelets 267  5/1/2020     WBC 4.6, hemoglobin 12.0, MCV 78, RDW 18.2, platelet 256  5/7/2021     WBC 4.4, hemoglobin 11.6, MCV 87 D8, RDW 17.2, platelet 283  3/7/22         WBC 6.1, hemoglobin 10.6, MCV 74, RDW 17.4, platelet 296  9/1/2023     WBC 5.4, hemoglobin 9.2, MCV 72, RDW 19.6, platelet 344  10/11/2023    Ferritin = 9; started on oral iron supplements    Iron saturation 10%, TIBC 369  11/6/2023   WBC 4.9, hemoglobin 11.8, MCV 80, RDW 20.0, platelet 298  Ferritin 21; recommended increasing oral supplements to twice a day with vitamin C  Iron  saturation 12 TIBC 336    Menstrual history: Patient has noted crease in cycle length to approximately 21 days.  Occasionally patient will have her period twice a month.  Patient bleeds heavily for 3 days changing a pad or tampon every 2-3 hours.  Patient is not on birth control.  Patient had tried it 25 years ago and has not gone back to it.  No tobacco use.    01/04/24: Mild stomach upset associated with nausea with oral iron.  Patient notes intermittent compliance.     Notes intermittent headaches some worse than others.  Fatigue.  No easy bruising.  Sleeping well.    No family history of colon cancer.  Patient has never had a colonoscopy.  Patient is up-to-date on Pap smear last completed in 9/23. Neg for high risk HPV    Review of Systems   Constitutional:  Positive for fatigue. Negative for appetite change, fever and unexpected weight change.   HENT:  Negative for nosebleeds.    Respiratory:  Negative for cough, choking and shortness of breath.         Negative hemoptysis.   Cardiovascular:  Negative for chest pain, palpitations and leg swelling.   Gastrointestinal: Negative.  Negative for abdominal distention, abdominal pain, anal bleeding, blood in stool, constipation, diarrhea, nausea and vomiting.   Endocrine: Negative.  Negative for cold intolerance.   Genitourinary: Negative.  Negative for hematuria, menstrual problem, vaginal bleeding, vaginal discharge and vaginal pain.   Musculoskeletal: Negative.  Negative for arthralgias, myalgias, neck pain and neck stiffness.   Skin: Negative.  Negative for color change, pallor and rash.   Allergic/Immunologic: Negative.  Negative for immunocompromised state.   Neurological: Negative.  Negative for weakness and headaches.   Hematological:  Negative for adenopathy. Does not bruise/bleed easily.   All other systems reviewed and are negative.      Past Medical History:   Diagnosis Date    Class 3 severe obesity due to excess calories with serious comorbidity and body  mass index (BMI) of 45.0 to 49.9 in adult ([facility]) 12/10/2018    Depression     Disease of thyroid gland     Edema of both legs 2012    Hypertension     Iron deficiency anemia secondary to inadequate dietary iron intake 1/2/2019    Macrocytosis 2012    Vitamin D deficiency      Past Surgical History:   Procedure Laterality Date    THYROID SURGERY      ablation    TUBAL LIGATION Bilateral 2009     Family History   Problem Relation Age of Onset    Breast cancer Mother 37    Breast cancer Maternal Grandmother     Breast cancer Family     Breast cancer Cousin     Breast cancer Maternal Aunt      Social History     Socioeconomic History    Marital status: Single     Spouse name: Not on file    Number of children: Not on file    Years of education: Not on file    Highest education level: Not on file   Occupational History    Not on file   Tobacco Use    Smoking status: Never    Smokeless tobacco: Never   Vaping Use    Vaping status: Never Used   Substance and Sexual Activity    Alcohol use: Yes    Drug use: No    Sexual activity: Yes     Partners: Male     Birth control/protection: Female Sterilization   Other Topics Concern    Not on file   Social History Narrative    Not on file     Social Determinants of Health     Financial Resource Strain: Not on file   Food Insecurity: Not on file   Transportation Needs: Not on file   Physical Activity: Not on file   Stress: Not on file   Social Connections: Not on file   Intimate Partner Violence: Not on file   Housing Stability: Not on file         Current Outpatient Medications:     ascorbic acid (VITAMIN C) 500 MG tablet, Take 500 mg by mouth daily, Disp: , Rfl:     ergocalciferol (VITAMIN D2) 50,000 units, Take 50,000 Units by mouth once a week, Disp: , Rfl:     ferrous sulfate 325 (65 Fe) mg tablet, Take 325 mg by mouth, Disp: , Rfl:     hydrochlorothiazide (HYDRODIURIL) 25 mg tablet, Take 1 tablet (25 mg total) by mouth daily, Disp: 90 tablet, Rfl: 1    levothyroxine 175 mcg  "tablet, Take 1 tablet (175 mcg total) by mouth daily, Disp: 90 tablet, Rfl: 1    phentermine (ADIPEX-P) 37.5 MG tablet, TAKE 1 TABLET (37.5 MG TOTAL) BY MOUTH EVERY MORNING BEFORE BREAKFAST., Disp: , Rfl:     ibuprofen (MOTRIN) 800 mg tablet, Take 800 mg by mouth every 6 to 8 hours as needed for pain (Patient not taking: Reported on 3/7/2023), Disp: , Rfl:     Multiple Vitamin (multivitamin) capsule, Take 1 capsule by mouth daily (Patient not taking: Reported on 8/7/2023), Disp: , Rfl:   No Known Allergies    Objective   /78 (BP Location: Left arm)   Pulse 100   Temp (!) 96.9 °F (36.1 °C) (Tympanic)   Resp 18   Ht 5' 6\" (1.676 m)   Wt (!) 137 kg (301 lb)   SpO2 97%   BMI 48.58 kg/m²   Physical Exam  Constitutional:       General: She is not in acute distress.     Appearance: She is well-developed. She is obese.   HENT:      Head: Normocephalic and atraumatic.      Mouth/Throat:      Pharynx: No oropharyngeal exudate.   Eyes:      General: No scleral icterus.     Pupils: Pupils are equal, round, and reactive to light.   Cardiovascular:      Rate and Rhythm: Normal rate and regular rhythm.      Heart sounds: No murmur heard.  Pulmonary:      Effort: Pulmonary effort is normal. No respiratory distress.      Breath sounds: Normal breath sounds.   Abdominal:      General: Bowel sounds are normal. There is no distension.      Palpations: Abdomen is soft.      Tenderness: There is no abdominal tenderness.   Musculoskeletal:         General: Normal range of motion.      Cervical back: Normal range of motion.      Right lower leg: No edema.      Left lower leg: No edema.   Lymphadenopathy:      Cervical: No cervical adenopathy.   Skin:     General: Skin is warm.      Coloration: Skin is not pale.      Findings: No rash.   Neurological:      Mental Status: She is alert and oriented to person, place, and time.      Cranial Nerves: No cranial nerve deficit.   Psychiatric:         Thought Content: Thought content " normal.         Result Review  Labs:  No visits with results within 3 Week(s) from this visit.   Latest known visit with results is:   Appointment on 11/06/2023   Component Date Value Ref Range Status    Sodium 11/06/2023 138  135 - 147 mmol/L Final    Potassium 11/06/2023 3.1 (L)  3.5 - 5.3 mmol/L Final    Chloride 11/06/2023 100  96 - 108 mmol/L Final    CO2 11/06/2023 29  21 - 32 mmol/L Final    ANION GAP 11/06/2023 9  mmol/L Final    BUN 11/06/2023 12  5 - 25 mg/dL Final    Creatinine 11/06/2023 0.63  0.60 - 1.30 mg/dL Final    Standardized to IDMS reference method    Glucose, Fasting 11/06/2023 98  65 - 99 mg/dL Final    Calcium 11/06/2023 9.2  8.4 - 10.2 mg/dL Final    AST 11/06/2023 12 (L)  13 - 39 U/L Final    ALT 11/06/2023 8  7 - 52 U/L Final    Specimen collection should occur prior to Sulfasalazine administration due to the potential for falsely depressed results.     Alkaline Phosphatase 11/06/2023 73  34 - 104 U/L Final    Total Protein 11/06/2023 7.2  6.4 - 8.4 g/dL Final    Albumin 11/06/2023 4.2  3.5 - 5.0 g/dL Final    Total Bilirubin 11/06/2023 0.67  0.20 - 1.00 mg/dL Final    Use of this assay is not recommended for patients undergoing treatment with eltrombopag due to the potential for falsely elevated results.  N-acetyl-p-benzoquinone imine (metabolite of Acetaminophen) will generate erroneously low results in samples for patients that have taken an overdose of Acetaminophen.    eGFR 11/06/2023 110  ml/min/1.73sq m Final    Hemoglobin A1C 11/06/2023 5.4  Normal 4.0-5.6%; PreDiabetic 5.7-6.4%; Diabetic >=6.5%; Glycemic control for adults with diabetes <7.0% % Final    EAG 11/06/2023 108  mg/dl Final    Vit D, 25-Hydroxy 11/06/2023 25.2 (L)  30.0 - 100.0 ng/mL Final    Vitamin D guidelines established by Clinical Guidelines Subcommittee  of the Endocrine Society Task Force, 2011    Deficiency <20ng/ml   Insufficiency 20-30ng/ml   Sufficient  ng/ml     Free T4 11/06/2023 1.16 (H)  0.61 -  1.12 ng/dL Final    Specimens with biotin concentrations > 10 ng/mL can lead to significant (> 10%) positive bias in result.    Cholesterol 11/06/2023 141  See Comment mg/dL Final    Cholesterol:         Pediatric <18 Years        Desirable          <170 mg/dL      Borderline High    170-199 mg/dL      High               >=200 mg/dL        Adult >=18 Years            Desirable         <200 mg/dL      Borderline High   200-239 mg/dL      High              >239 mg/dL      Triglycerides 11/06/2023 108  See Comment mg/dL Final    Triglyceride:     0-9Y            <75mg/dL     10Y-17Y         <90 mg/dL       >=18Y     Normal          <150 mg/dL     Borderline High 150-199 mg/dL     High            200-499 mg/dL        Very High       >499 mg/dL    Specimen collection should occur prior to Metamizole administration due to the potential for falsely depressed results.    HDL, Direct 11/06/2023 46 (L)  >=50 mg/dL Final    LDL Calculated 11/06/2023 73  0 - 100 mg/dL Final    LDL Cholesterol:     Optimal           <100 mg/dl     Near Optimal      100-129 mg/dl     Above Optimal       Borderline High 130-159 mg/dl       High            160-189 mg/dl       Very High       >189 mg/dl         This screening LDL is a calculated result.   It does not have the accuracy of the Direct Measured LDL in the monitoring of patients with hyperlipidemia and/or statin therapy.   Direct Measure LDL (TQQ307) must be ordered separately in these patients.    Non-HDL-Chol (CHOL-HDL) 11/06/2023 95  mg/dl Final    TSH 3RD GENERATON 11/06/2023 0.402 (L)  0.450 - 4.500 uIU/mL Final    The recommended reference ranges for TSH during pregnancy are as follows:   First trimester 0.100 to 2.500 uIU/mL   Second trimester  0.200 to 3.000 uIU/mL   Third trimester 0.300 to 3.000 uIU/m    Note: Normal ranges may not apply to patients who are transgender, non-binary, or whose legal sex, sex at birth, and gender identity differ.  Adult TSH (3rd generation)  reference range follows the recommended guidelines of the American Thyroid Association, January, 2020.    WBC 11/06/2023 4.99  4.31 - 10.16 Thousand/uL Final    RBC 11/06/2023 5.07  3.81 - 5.12 Million/uL Final    Hemoglobin 11/06/2023 11.8  11.5 - 15.4 g/dL Final    Hematocrit 11/06/2023 40.5  34.8 - 46.1 % Final    MCV 11/06/2023 80 (L)  82 - 98 fL Final    MCH 11/06/2023 23.3 (L)  26.8 - 34.3 pg Final    MCHC 11/06/2023 29.1 (L)  31.4 - 37.4 g/dL Final    RDW 11/06/2023 22.0 (H)  11.6 - 15.1 % Final    MPV 11/06/2023 10.0  8.9 - 12.7 fL Final    Platelets 11/06/2023 290  149 - 390 Thousands/uL Final    nRBC 11/06/2023 0  /100 WBCs Final    Neutrophils Relative 11/06/2023 71  43 - 75 % Final    Immat GRANS % 11/06/2023 0  0 - 2 % Final    Lymphocytes Relative 11/06/2023 19  14 - 44 % Final    Monocytes Relative 11/06/2023 6  4 - 12 % Final    Eosinophils Relative 11/06/2023 4  0 - 6 % Final    Basophils Relative 11/06/2023 0  0 - 1 % Final    Neutrophils Absolute 11/06/2023 3.53  1.85 - 7.62 Thousands/µL Final    Immature Grans Absolute 11/06/2023 0.01  0.00 - 0.20 Thousand/uL Final    Lymphocytes Absolute 11/06/2023 0.97  0.60 - 4.47 Thousands/µL Final    Monocytes Absolute 11/06/2023 0.28  0.17 - 1.22 Thousand/µL Final    Eosinophils Absolute 11/06/2023 0.18  0.00 - 0.61 Thousand/µL Final    Basophils Absolute 11/06/2023 0.02  0.00 - 0.10 Thousands/µL Final         Please note:  This report has been generated by a voice recognition software system. Therefore there may be syntax, spelling, and/or grammatical errors. Please call if you have any questions.

## 2024-01-05 ENCOUNTER — TELEPHONE (OUTPATIENT)
Dept: HEMATOLOGY ONCOLOGY | Facility: CLINIC | Age: 44
End: 2024-01-05

## 2024-01-05 PROBLEM — E53.8 B12 DEFICIENCY: Status: ACTIVE | Noted: 2024-01-05

## 2024-01-05 RX ORDER — CYANOCOBALAMIN 1000 UG/ML
1000 INJECTION, SOLUTION INTRAMUSCULAR; SUBCUTANEOUS ONCE
Start: 2024-01-18 | End: 2024-01-18

## 2024-01-05 NOTE — TELEPHONE ENCOUNTER
----- Message from Geetha Wise PA-C sent at 1/5/2024  3:02 PM EST -----  Add b 12 IM injection with Venofer.  Also have her take B12 3000 mcg Daily x 2 months then 1500 mcg Daily until follow up.     Call our Folic Acid 1 mg PO daily for 4 months      Attempted to call patient with above information.  Left voice message with direct call back number, requesting return call.

## 2024-01-08 ENCOUNTER — OFFICE VISIT (OUTPATIENT)
Dept: FAMILY MEDICINE CLINIC | Facility: CLINIC | Age: 44
End: 2024-01-08
Payer: COMMERCIAL

## 2024-01-08 VITALS
HEART RATE: 72 BPM | OXYGEN SATURATION: 99 % | TEMPERATURE: 97.4 F | BODY MASS INDEX: 47.09 KG/M2 | SYSTOLIC BLOOD PRESSURE: 128 MMHG | HEIGHT: 66 IN | WEIGHT: 293 LBS | DIASTOLIC BLOOD PRESSURE: 76 MMHG

## 2024-01-08 DIAGNOSIS — I10 ESSENTIAL HYPERTENSION: ICD-10-CM

## 2024-01-08 DIAGNOSIS — D50.0 IRON DEFICIENCY ANEMIA DUE TO CHRONIC BLOOD LOSS: ICD-10-CM

## 2024-01-08 DIAGNOSIS — R22.9 LUMP OF SKIN: ICD-10-CM

## 2024-01-08 DIAGNOSIS — E53.8 B12 DEFICIENCY: Primary | ICD-10-CM

## 2024-01-08 DIAGNOSIS — G47.33 OBSTRUCTIVE SLEEP APNEA TREATED WITH CONTINUOUS POSITIVE AIRWAY PRESSURE (CPAP): ICD-10-CM

## 2024-01-08 DIAGNOSIS — E53.8 B12 DEFICIENCY: ICD-10-CM

## 2024-01-08 DIAGNOSIS — Z12.31 ENCOUNTER FOR SCREENING MAMMOGRAM FOR MALIGNANT NEOPLASM OF BREAST: ICD-10-CM

## 2024-01-08 DIAGNOSIS — E87.6 HYPOKALEMIA: ICD-10-CM

## 2024-01-08 DIAGNOSIS — D50.8 IRON DEFICIENCY ANEMIA SECONDARY TO INADEQUATE DIETARY IRON INTAKE: ICD-10-CM

## 2024-01-08 DIAGNOSIS — E78.1 PRIMARY HYPERTRIGLYCERIDEMIA: ICD-10-CM

## 2024-01-08 DIAGNOSIS — E89.0 HYPOTHYROIDISM FOLLOWING RADIOIODINE THERAPY: Primary | ICD-10-CM

## 2024-01-08 DIAGNOSIS — E66.01 OBESITY, CLASS III, BMI 40-49.9 (MORBID OBESITY) (HCC): ICD-10-CM

## 2024-01-08 DIAGNOSIS — I83.893 VARICOSE VEINS OF BOTH LEGS WITH EDEMA: ICD-10-CM

## 2024-01-08 PROBLEM — Z83.3 FAMILY HISTORY OF DIABETES MELLITUS TYPE II: Status: ACTIVE | Noted: 2023-09-07

## 2024-01-08 PROBLEM — Z86.39 HISTORY OF GRAVES' DISEASE: Status: ACTIVE | Noted: 2023-10-12

## 2024-01-08 PROCEDURE — 99214 OFFICE O/P EST MOD 30 MIN: CPT | Performed by: PHYSICIAN ASSISTANT

## 2024-01-08 RX ORDER — HYDROCHLOROTHIAZIDE 25 MG/1
25 TABLET ORAL DAILY
Qty: 90 TABLET | Refills: 1 | Status: SHIPPED | OUTPATIENT
Start: 2024-01-08

## 2024-01-08 RX ORDER — FOLIC ACID 1 MG/1
1 TABLET ORAL DAILY
Qty: 30 TABLET | Refills: 3 | Status: SHIPPED | OUTPATIENT
Start: 2024-01-08

## 2024-01-08 RX ORDER — CYANOCOBALAMIN 1000 UG/ML
1000 INJECTION, SOLUTION INTRAMUSCULAR; SUBCUTANEOUS ONCE
OUTPATIENT
Start: 2024-01-18

## 2024-01-08 RX ORDER — LANOLIN ALCOHOL/MO/W.PET/CERES
1000 CREAM (GRAM) TOPICAL DAILY
Qty: 90 TABLET | Refills: 1 | Status: SHIPPED | OUTPATIENT
Start: 2024-01-08

## 2024-01-08 NOTE — PROGRESS NOTES
Name: Maritza Butt      : 1980      MRN: [id number]  Encounter Provider: Christine Coello PA-C  Encounter Date: 2024   Encounter department: [facility]    Assessment & Plan     1. Hypothyroidism following radioiodine therapy    2. Obesity, Class III, BMI 40-49.9 (morbid obesity) (HCC)    3. Obstructive sleep apnea treated with continuous positive airway pressure (CPAP)  -     Ambulatory Referral to Sleep Medicine; Future    4. Essential hypertension  -     hydrochlorothiazide (HYDRODIURIL) 25 mg tablet; Take 1 tablet (25 mg total) by mouth daily    5. Varicose veins of both legs with edema    6. Primary hypertriglyceridemia    7. Iron deficiency anemia secondary to inadequate dietary iron intake    8. Hypokalemia  -     Basic metabolic panel; Future    9. B12 deficiency  -     vitamin B-12 (VITAMIN B-12) 1,000 mcg tablet; Take 1 tablet (1,000 mcg total) by mouth daily    10. Encounter for screening mammogram for malignant neoplasm of breast  -     Mammo screening bilateral w 3d & cad; Future; Expected date: 2024    11. Lump of skin  -     US head neck soft tissue; Future; Expected date: 2024    Lab results have been reviewed.  -She will continue with Dr. Molina for her hypothyroidism.  TSH from 2023 is mildly decreased-level is 0.402 previously 4.480.  She is currently on levothyroxine 175 mcg daily.    He also may be giving her injections for her vitamin B12 deficiency.  In the meantime I did recommend she take over-the-counter vitamin B12 1000 mcg daily    - Soft tissue ultrasound of mass under right chin has been ordered  - Refer to sleep medicine to discuss other options for sleep apnea since she is not able to tolerate the CPAP.  Advised of increased risk for cardiac problems with uncontrolled sleep apnea  - I did give her a diet again for potassium rich foods.  Given in July also.  She has been eating bananas off and on.  She will  check another BMP when she is proceeding with blood work in several weeks for the specialist  - She prefers to hold on flu vaccine at this time  - Mammogram ordered  - If phentermine is being prescribed by Dr. Molina for weight loss  - Continue on iron supplement for iron deficiency anemia  - Continue on a low-fat diet  - Elevate legs as needed for swelling  - I did recommend follow-up in 5 months    M*Modal software was used to dictate this note. It may contain errors with dictating incorrect words/spelling. Please contact provider directly for any questions.          Subjective      Patient presents today for routine follow-up.  She does continue to follow with Dr. Molina for her thyroid, etc.  She states that she has not been able to utilize the CPAP for her sleep apnea.  She has not discussed other options with the sleep specialist.  She is scheduled to see Dr. Molina this Friday/1/12/2024.    She states in November she was having some throat discomfort.  The throat pain has resolved but she has noticed a lump on the right side under her chin.  She denies any increase in size.      Review of Systems   Constitutional: Negative.    Respiratory:  Negative for cough and shortness of breath.    Cardiovascular:  Negative for chest pain and leg swelling.   Skin:         Lump under chin on right side       Current Outpatient Medications on File Prior to Visit   Medication Sig    ascorbic acid (VITAMIN C) 500 MG tablet Take 500 mg by mouth daily    ergocalciferol (VITAMIN D2) 50,000 units Take 50,000 Units by mouth once a week    ferrous sulfate 325 (65 Fe) mg tablet Take 325 mg by mouth    levothyroxine 175 mcg tablet Take 1 tablet (175 mcg total) by mouth daily    phentermine (ADIPEX-P) 37.5 MG tablet TAKE 1 TABLET (37.5 MG TOTAL) BY MOUTH EVERY MORNING BEFORE BREAKFAST.    [DISCONTINUED] hydrochlorothiazide (HYDRODIURIL) 25 mg tablet Take 1 tablet (25 mg total) by mouth daily    ibuprofen (MOTRIN) 800 mg tablet  "Take 800 mg by mouth every 6 to 8 hours as needed for pain (Patient not taking: Reported on 3/7/2023)    Multiple Vitamin (multivitamin) capsule Take 1 capsule by mouth daily (Patient not taking: Reported on 8/7/2023)       Objective     /76 (BP Location: Left arm, Patient Position: Sitting, Cuff Size: Standard)   Pulse 72   Temp (!) 97.4 °F (36.3 °C) (Temporal)   Ht 5' 6\" (1.676 m)   Wt 135 kg (297 lb)   SpO2 99%   BMI 47.94 kg/m²     Physical Exam  Vitals reviewed.   Constitutional:       General: She is not in acute distress.     Appearance: Normal appearance. She is well-developed. She is not ill-appearing, toxic-appearing or diaphoretic.   HENT:      Head: Normocephalic and atraumatic.      Mouth/Throat:      Comments: There is a palpable nonmobile mass which is nontender about 1/2 cm on the right chin/sublingual region.  Neck:      Thyroid: No thyromegaly.   Cardiovascular:      Rate and Rhythm: Normal rate and regular rhythm.      Heart sounds: Normal heart sounds. No murmur heard.     No friction rub. No gallop.   Pulmonary:      Effort: Pulmonary effort is normal. No respiratory distress.      Breath sounds: Normal breath sounds. No wheezing, rhonchi or rales.   Abdominal:      General: Bowel sounds are normal.      Palpations: Abdomen is soft. There is no mass.      Tenderness: There is no abdominal tenderness.   Musculoskeletal:         General: No deformity.      Cervical back: Neck supple.      Right lower leg: No edema.      Left lower leg: No edema.   Lymphadenopathy:      Cervical: No cervical adenopathy.   Skin:     General: Skin is warm.   Neurological:      General: No focal deficit present.      Mental Status: She is alert.   Psychiatric:         Mood and Affect: Mood normal.         Behavior: Behavior normal.         Thought Content: Thought content normal.         Judgment: Judgment normal.       Christine Coello PA-C    "

## 2024-01-15 ENCOUNTER — HOSPITAL ENCOUNTER (OUTPATIENT)
Dept: ULTRASOUND IMAGING | Facility: HOSPITAL | Age: 44
Discharge: HOME | End: 2024-01-15
Payer: COMMERCIAL

## 2024-01-15 DIAGNOSIS — R22.9 LUMP OF SKIN: ICD-10-CM

## 2024-01-15 PROCEDURE — 76536 US EXAM OF HEAD AND NECK: CPT

## 2024-01-15 NOTE — LETTER
[facility]  [address] [place] [address]      January 24, 2024    MRN: [id number]     Phone: [phone]     Dear Ms. Butt,    You recently had a(n) Ultrasound performed on 1/15/2024 at  [facility] that was requested by Christine Coello PA-C. The study was reviewed by a radiologist, which is a physician who specializes in medical imaging. The radiologist issued a report describing his or her findings. In that report there was a finding that the radiologist felt warranted further discussion with your health care provider and that discussion would be beneficial to you.      The results were sent to Christine Coello PA-C on 01/20/2024  3:26 PM. We recommend that you contact Christine Coello PA-C at [phone] or set up an appointment to discuss the results of the imaging test. If you have already heard from Christine Coello PA-C regarding the results of your study, you can disregard this letter.     This letter is not meant to alarm you, but intended to encourage you to follow-up on your results with the provider that sent you for the imaging study. In addition, we have enclosed answers to frequently asked questions by other patients who have also received a letter to review results with their health care provider (see page two).      Thank you for choosing [facility] for your medical imaging needs.                                                                                                                                                        FREQUENTLY ASKED QUESTIONS    Why am I receiving this letter?  [place] State Law requires us to notify patients who have findings on imaging exams that may require more testing or follow-up with a health professional within the next 3 months.        How serious is the finding on the imaging test?  This letter is sent to all patients who may need follow-up or more testing within the next 3  months.  Receiving this letter does not necessarily mean you have a life-threatening imaging finding or disease.  Recommendations in the radiologist’s imaging report are general in nature and it is up to your healthcare provider to say whether those recommendations make sense for your situation.  You are strongly encouraged to talk to your health care provider about the results and ask whether additional steps need to be taken.    Where can I get a copy of the final report for my recent radiology exam?  To get a full copy of the report you can access your records online at [email or web address] or please contact [facility]’s Medical Records Department at [phone] Monday through Friday between 8 am and 6 pm.         What do I need to do now?           Please contact your health care provider who requested the imaging study to discuss what further actions (if any) are needed.  You may have already heard from (your ordering provider) in regard to this test in which case you can disregard this letter.        NOTICE IN ACCORDANCE WITH THE [place] STATE “PATIENT TEST RESULT INFORMATION ACT OF 2018”    You are receiving this notice as a result of a determination by your diagnostic imaging service that further discussions of your test results are warranted and would be beneficial to you.    The complete results of your test or tests have been or will be sent to the health care practitioner that ordered the test or tests. It is recommended that you contact your health care practitioner to discuss your results as soon as possible.

## 2024-01-22 DIAGNOSIS — R93.89 ABNORMAL ULTRASOUND OF NECK: ICD-10-CM

## 2024-01-22 DIAGNOSIS — R22.1 NECK MASS: Primary | ICD-10-CM

## 2024-01-22 NOTE — RESULT ENCOUNTER NOTE
Please let her know that the ultrasound of her neck is not decisive with type of lump.  CT of neck with contrast is recommended.  I did put an order in the system.  Thank you

## 2024-02-03 ENCOUNTER — HOSPITAL ENCOUNTER (OUTPATIENT)
Dept: CT IMAGING | Facility: HOSPITAL | Age: 44
Discharge: HOME | End: 2024-02-03
Payer: COMMERCIAL

## 2024-02-03 DIAGNOSIS — R93.89 ABNORMAL ULTRASOUND OF NECK: ICD-10-CM

## 2024-02-03 DIAGNOSIS — R22.1 NECK MASS: ICD-10-CM

## 2024-02-03 PROCEDURE — G1004 CDSM NDSC: HCPCS

## 2024-02-03 PROCEDURE — 70491 CT SOFT TISSUE NECK W/DYE: CPT

## 2024-02-03 RX ADMIN — IOHEXOL 85 ML: 350 INJECTION, SOLUTION INTRAVENOUS at 08:40

## 2024-02-09 ENCOUNTER — TELEPHONE (OUTPATIENT)
Age: 44
End: 2024-02-09

## 2024-02-09 NOTE — TELEPHONE ENCOUNTER
Findings significant for reactive lymph node and recent tooth extraction.  Recommend continue monitoring of lymph node until resolution by PCP as well as follow-up with dentist regarding tooth extraction.

## 2024-02-09 NOTE — TELEPHONE ENCOUNTER
Patient informed on message, patient stated that she removed the tooth herself. Patient will call to find dentist and schedule follow up with Christine thank you.

## 2024-02-19 ENCOUNTER — TELEPHONE (OUTPATIENT)
Dept: FAMILY MEDICINE CLINIC | Facility: CLINIC | Age: 44
End: 2024-02-19

## 2024-02-19 NOTE — TELEPHONE ENCOUNTER
----- Message from Christine Coello PA-C sent at 2/19/2024  8:17 AM EST -----  Patient is primarily Indonesian-speaking.  Please call her regarding the recent CT scan she had of her neck.  There are inflamed lymph nodes on the right side.  The radiologist also noticed that she had a recent tooth extraction on the right side which might be related to her inflamed lymph nodes.  I would recommend following up with her dentist to make sure she does not have any infection in that area.  Any increase in the lymph nodes she should let me know.  Thank you

## 2024-02-21 PROBLEM — Z12.4 CERVICAL CANCER SCREENING: Status: RESOLVED | Noted: 2020-03-02 | Resolved: 2024-02-21

## 2024-02-23 ENCOUNTER — OFFICE VISIT (OUTPATIENT)
Dept: FAMILY MEDICINE CLINIC | Facility: CLINIC | Age: 44
End: 2024-02-23
Payer: COMMERCIAL

## 2024-02-23 VITALS
WEIGHT: 292.6 LBS | HEIGHT: 66 IN | SYSTOLIC BLOOD PRESSURE: 124 MMHG | BODY MASS INDEX: 47.02 KG/M2 | HEART RATE: 76 BPM | TEMPERATURE: 98.1 F | DIASTOLIC BLOOD PRESSURE: 82 MMHG | OXYGEN SATURATION: 97 %

## 2024-02-23 DIAGNOSIS — R59.0 CERVICAL LYMPHADENOPATHY: Primary | ICD-10-CM

## 2024-02-23 DIAGNOSIS — R93.89 ABNORMAL CT SCAN, NECK: ICD-10-CM

## 2024-02-23 PROCEDURE — 99213 OFFICE O/P EST LOW 20 MIN: CPT | Performed by: PHYSICIAN ASSISTANT

## 2024-02-23 RX ORDER — LEVOTHYROXINE SODIUM 0.15 MG/1
TABLET ORAL
COMMUNITY
Start: 2024-02-04

## 2024-02-23 RX ORDER — TIRZEPATIDE 5 MG/.5ML
INJECTION, SOLUTION SUBCUTANEOUS
COMMUNITY

## 2024-02-23 NOTE — PROGRESS NOTES
Name: Maritza Butt      : 1980      MRN: [id number]  Encounter Provider: Christine Coello PA-C  Encounter Date: 2024   Encounter department: [facility]    Assessment & Plan     1. Cervical lymphadenopathy    2. Abnormal CT scan, neck    CT scan results have been reviewed.  She does have subcentimeter cervical adenopathy that does not require any further follow-up.  I was not able to palpate any cervical lymph nodes.  She does have a small lymph node in the submandibular region which she states has been the same size for a while.  I did advise her if there is any increase in size and any lumps in her neck or submandibular region she should follow-up.  -No dental issues at this time.  She did pull her own tooth that was very loose in January.  She denies any known infection at that time.  Her exam is good here in the office today.  She has not seen a dentist.  - Otherwise she will follow-up as scheduled in     M*Writer.ly software was used to dictate this note. It may contain errors with dictating incorrect words/spelling. Please contact provider directly for any questions.          Subjective      Patient presents today for follow-up from a recent CT scan done on 2/3/2024.  She states sometime in January she had a loose right lower molar that she pulled on her own.  She states that it did not seem to be infected.  She does not have any pain, fever, chills at this time.  She does feel a small lump under the submandibular region but she has not felt any lumps in her neck.  She has lost over 19 pounds by initially starting with phentermine and now she is on Zepp bound injections prescribed by specialist.      Review of Systems   Constitutional:  Negative for chills and fever.   HENT:  Negative for dental problem and mouth sores.        Current Outpatient Medications on File Prior to Visit   Medication Sig    ascorbic acid (VITAMIN C) 500 MG tablet Take 500  "mg by mouth daily    ergocalciferol (VITAMIN D2) 50,000 units Take 50,000 Units by mouth once a week    ferrous sulfate 325 (65 Fe) mg tablet Take 325 mg by mouth    folic acid (KP Folic Acid) 1 mg tablet Take 1 tablet (1 mg total) by mouth daily    hydrochlorothiazide (HYDRODIURIL) 25 mg tablet Take 1 tablet (25 mg total) by mouth daily    levothyroxine 150 mcg tablet TAKE 1 TABLET (150 MCG TOTAL) BY MOUTH DAILY. ONLY ON SUNDAY MORNING    levothyroxine 175 mcg tablet Take 1 tablet (175 mcg total) by mouth daily    vitamin B-12 (VITAMIN B-12) 1,000 mcg tablet Take 1 tablet (1,000 mcg total) by mouth daily    Zepbound 5 MG/0.5ML auto-injector INJECT 5 MG UNDER THE SKIN EVERY 7 DAYS.    [DISCONTINUED] Vitamin C 250 MG TABS Take 500 mg by mouth daily    [DISCONTINUED] ibuprofen (MOTRIN) 800 mg tablet Take 800 mg by mouth every 6 to 8 hours as needed for pain (Patient not taking: Reported on 2/23/2024)    [DISCONTINUED] Multiple Vitamin (multivitamin) capsule Take 1 capsule by mouth daily (Patient not taking: Reported on 8/7/2023)    [DISCONTINUED] phentermine (ADIPEX-P) 37.5 MG tablet TAKE 1 TABLET (37.5 MG TOTAL) BY MOUTH EVERY MORNING BEFORE BREAKFAST. (Patient not taking: Reported on 2/23/2024)       Objective     /82 (BP Location: Left arm, Patient Position: Sitting, Cuff Size: Large)   Pulse 76   Temp 98.1 °F (36.7 °C) (Tympanic)   Ht 5' 6\" (1.676 m)   Wt 133 kg (292 lb 9.6 oz)   SpO2 97%   BMI 47.23 kg/m²     Physical Exam  Vitals reviewed.   Constitutional:       General: She is not in acute distress.     Appearance: Normal appearance. She is not ill-appearing, toxic-appearing or diaphoretic.   HENT:      Head: Normocephalic and atraumatic.      Mouth/Throat:      Comments: Oral exam: No obvious gum swelling.  She does have a palpable submandibular lymph node which she states has been the same size for a while.  Cardiovascular:      Rate and Rhythm: Normal rate and regular rhythm.      Heart sounds: " Normal heart sounds. No murmur heard.  Pulmonary:      Effort: Pulmonary effort is normal. No respiratory distress.      Breath sounds: Normal breath sounds. No wheezing, rhonchi or rales.   Abdominal:      General: Abdomen is flat. Bowel sounds are normal.      Tenderness: There is no abdominal tenderness.   Musculoskeletal:      Cervical back: Neck supple.   Lymphadenopathy:      Cervical: No cervical adenopathy.   Skin:     General: Skin is warm.   Neurological:      General: No focal deficit present.      Mental Status: She is alert.   Psychiatric:         Mood and Affect: Mood normal.         Behavior: Behavior normal.         Thought Content: Thought content normal.         Judgment: Judgment normal.       Christine Coello PA-C

## 2024-03-06 ENCOUNTER — DOCUMENTATION (OUTPATIENT)
Dept: GENETICS | Facility: CLINIC | Age: 44
End: 2024-03-06

## 2024-03-06 ENCOUNTER — CLINICAL SUPPORT (OUTPATIENT)
Dept: GENETICS | Facility: CLINIC | Age: 44
End: 2024-03-06
Payer: COMMERCIAL

## 2024-03-06 DIAGNOSIS — Z80.3 FAMILY HISTORY OF BREAST CANCER: ICD-10-CM

## 2024-03-06 DIAGNOSIS — Z84.81 FAMILY HISTORY OF BREAST CANCER GENE MUTATION IN FIRST DEGREE RELATIVE: Primary | ICD-10-CM

## 2024-03-06 PROCEDURE — 36415 COLL VENOUS BLD VENIPUNCTURE: CPT

## 2024-03-06 NOTE — PROGRESS NOTES
"Pre-Test Genetic Counseling Consult Note    Patient Name: Maritza Butt   /Age: 1980/43 y.o.  Referring Provider: Stephen Vail MD    Date of Service: 3/6/2024  Genetic Counselor: Melani Maldonado MS, [facility] [place]  Interpretation Services: None  Location: In-person consult at [place]  Length of Visit: 60 Minutes    Maritza was referred to the [facility] Cancer Risk and Genetic Assessment Program due to her family history of breast cancer and familial BRCA1 variant.  She presents today to discuss the possibility of a hereditary cancer syndrome, options for genetic testing, and implications for her and her family. Her mother Sienna accompanied her to the appointment.        Cancer History and Treatment:     Personal History: No personal history of cancer      Screening Hx:     Breast:  Breast Imaging: Most recent mammogram is from 2018; she has her next mammogram scheduled for 2024  Breast Biopsy: No prior biopsy   Breast Density: Unknown    Colon:  Colonoscopy: No prior colonoscopy     Gynecologic:  Ovaries and Uterus intact     Skin:  No current screening    Reproductive History  Age at menarche: 10  Age at first live birth:  19  Menopause: Premenopausal  Hormone replacement: NA    Medical and Surgical History  Pertinent surgical history:   Past Surgical History:   Procedure Laterality Date    THYROID SURGERY      ablation    TUBAL LIGATION Bilateral       Pertinent medical history:  Past Medical History:   Diagnosis Date    Class 3 severe obesity due to excess calories with serious comorbidity and body mass index (BMI) of 45.0 to 49.9 in adult (HCC) 12/10/2018    Depression     Disease of thyroid gland     Edema of both legs     Hypertension     Iron deficiency anemia secondary to inadequate dietary iron intake 2019    Macrocytosis 2012    Vitamin D deficiency        Other History:  Height:   Ht Readings from Last 1 Encounters:   24 5' 6\" (1.676 m)     Weight:   Wt Readings " from Last 1 Encounters:   02/23/24 133 kg (292 lb 9.6 oz)     Relevant Family History   Patient reports no Ashkenazi Moravian ancestry.     Maternal Family History:  Mother (age 59) with bilateral breast cancer diagnosed at age 37; she underwent genetic testing via [employer] in 2008 and was reportedly BRCA1 positive, we were unable to get a copy of this genetic testing result during our appointment; however Maritza's mother signed a [employer] medical release form and we are requesting these records  Maternal Aunt (age 56) with breast cancer at age 37  Maternal Aunt (age 59) with breast cancer at age 58  Maternal Cousin (d age 49) female cousin through an unaffected uncle with breast cancer diagnosed at age 46  Maternal Cousin (age 50) female cousin through an unaffected uncle with breast cancer diagnosed at age 46; this relatives daughter was diagnosed with breast cancer at age 31  Maternal Grandmother (d age 62) with breast cancer at age 45  Maternal Great-Aunt (sister of grandmother with breast cancer)  Maternal Grandfather (d age 72) with no history of cancer     Paternal Family History:  Father (d age 62) with no history of cancer  Paternal Aunt (d age ?) with uterine cancer  Paternal Cousin (d age ?) male cousin with throat cancer  There is limited information on paternal relatives as many relatives live in [place]     Please refer to the scanned pedigree in the Media Tab for a complete family history     *All history is reported as provided by the patient; records are not available for review, except where indicated.     Assessment:  We discussed sporadic, familial and hereditary cancer.  We also discussed the many factors that influence our risk for cancer such as age, environmental exposures, lifestyle choices and family history.      We reviewed the indications suggestive of a hereditary predisposition to cancer.    Genetic testing is indicated for Maritza based on the following criteria:     Meets  NCCN V2.2024 General Testing Criteria (Located on page CRIT-1 in the Genetic/Familial High Risk Assessment: Breast, Ovarian and Pancreatic Guideline): Individuals with a blood relative with a pathogenic/likely pathogenic variant.  Specifically, Maritza's mother (FDR) reportedly carries a BRCA1 mutation.      Meets NCCN V2.2024 Testing Criteria for High-Penetrance Breast Cancer Susceptibility Genes: Family history of a mother (FDR) with bilateral breast cancer diagnosed at age 37; this relative is reportedly BRCA positive however we did not have a copy of the result to confirm this.  Additionally, this relative was tested in 2008 for BRCA1 and BRCA2 genes only and needs update testing.  There is a significant family history of breast cancer in maternal grandmother, aunts and cousins.      We discussed that Maritza's risk of carrying the familial BRCA variant previously identified in her mother is 50%.  We discussed the cancer risks and medical management recommendations associated with the BRCA1 gene.      The risks, benefits, and limitations of genetic testing were reviewed with the patient, as well as genetic discrimination laws, and possible test results (positive, negative, variants of uncertain significance) and their clinical implications. If positive for a mutation, options for managing cancer risk including increased surveillance, chemoprevention, and in some cases prophylactic surgery were discussed. Maritza was informed that if a hereditary cancer syndrome was identified in her, first degree relatives (parents, siblings, and children) have a chance of also inheriting the condition. Genetic testing would allow for predictive genetic testing in other relatives, who may also be at risk depending on their degree of relation.     Billing:  Most individuals pay <$100 for hereditary cancer genetic testing. If insurance covers the cost of the testing, individuals may still pay out of pocket secondary to co-pays,  co-insurance, or deductibles. If the cost of the testing exceeds $100, the lab will reach out to the patient via phone or e-mail. The patient will then have the option to proceed with the testing, cancel the testing, or elect the self-pay option of $250. Maritza verbalized understanding.     Plan: Patient decided to proceed with testing and provided consent.    Summary:     Sample Collection:  The patient's blood sample was drawn in the office on 3/6/24 by medical assistant Deshawn Richard    Genetic Testing Preformed: [employer]: Cancer® +OffersBy.Me® (59 genes): APC, ERIN, AXIN2, BAP1, BARD1, BMPR1A, BRCA1, BRCA2, BRIP1, CDH1, CDK4, CDKN1B, CDKN2A, CHEK2, CTNNA1, DICER1, EGLN1, EPCAM, FH, FLCN, GREM1, HOXB13, KIF1B, KIT, MAX, MEN1, MET, MITF, MLH1, MSH2, MSH3, MSH6, MUTYH, NF1, NTHL1, PALB2, PDGFRA PMS2, POLD1, POLE, POT1, PTEN, RAD51C, RAD51D, RB1, RET, SDHA, SDHAF2, SDHB, SDHC, SDHD, SMAD4, SMARCA4, STK11, BPOT347, TP53, TSC1, TSC2, VHL      Result Call Information:  In the event that we need to reach Maritza via telephone:  I confirmed the patient's mobile number on file as the best number to call with results  I confirmed with the patient that we can leave a voicemail on the provided numbers    Results take approximately 2-3 weeks to complete once test is started.    Maritza will be notified via [employer] once results are available.      Additional recommendations for surveillance/medical management will be made pending genetic test results.

## 2024-03-13 ENCOUNTER — TELEPHONE (OUTPATIENT)
Dept: HEMATOLOGY ONCOLOGY | Facility: CLINIC | Age: 44
End: 2024-03-13

## 2024-03-13 NOTE — TELEPHONE ENCOUNTER
Appointment Change  Cancel, Reschedule, Change to Virtual      Who are you speaking with? Patient   If it is not the patient, is the caller listed on the communication consent form? N/A   Which provider is the appointment scheduled with? Geetha Wise PA-C   When was the original appointment scheduled?    Please list date and time 4/22/24 10:00 AM   At which location is the appointment scheduled to take place? [place]   Was the appointment rescheduled?     Was the appointment changed from an in person visit to a virtual visit?    If so, please list the details of the change. 5/6/24 11:00 AM   What is the reason for the appointment change? Provider requested change due to scheduling conflict.        Was STAR transport scheduled? No   Does STAR transport need to be scheduled for the new visit (if applicable) No   Does the patient need an infusion appointment rescheduled? No   Does the patient have an upcoming infusion appointment scheduled? If so, when? No   Is the patient undergoing chemotherapy? No   For appointments cancelled with less than 24 hours:  Was the no-show policy reviewed? Yes

## 2024-04-03 ENCOUNTER — HOSPITAL ENCOUNTER (OUTPATIENT)
Dept: MAMMOGRAPHY | Facility: CLINIC | Age: 44
Discharge: HOME | End: 2024-04-03
Payer: COMMERCIAL

## 2024-04-03 VITALS — WEIGHT: 292 LBS | BODY MASS INDEX: 46.93 KG/M2 | HEIGHT: 66 IN

## 2024-04-03 DIAGNOSIS — Z12.31 ENCOUNTER FOR SCREENING MAMMOGRAM FOR MALIGNANT NEOPLASM OF BREAST: ICD-10-CM

## 2024-04-03 PROCEDURE — 77063 BREAST TOMOSYNTHESIS BI: CPT

## 2024-04-03 PROCEDURE — 77067 SCR MAMMO BI INCL CAD: CPT

## 2024-04-20 ENCOUNTER — LAB (OUTPATIENT)
Dept: LAB | Facility: HOSPITAL | Age: 44
End: 2024-04-20
Payer: COMMERCIAL

## 2024-04-20 DIAGNOSIS — D50.0 IRON DEFICIENCY ANEMIA DUE TO CHRONIC BLOOD LOSS: ICD-10-CM

## 2024-04-20 DIAGNOSIS — E87.6 HYPOKALEMIA: ICD-10-CM

## 2024-04-20 LAB
ANION GAP SERPL CALCULATED.3IONS-SCNC: 9 MMOL/L (ref 4–13)
BASOPHILS # BLD AUTO: 0.02 THOUSANDS/ÂΜL (ref 0–0.1)
BASOPHILS NFR BLD AUTO: 1 % (ref 0–1)
BUN SERPL-MCNC: 12 MG/DL (ref 5–25)
CALCIUM SERPL-MCNC: 9 MG/DL (ref 8.4–10.2)
CHLORIDE SERPL-SCNC: 102 MMOL/L (ref 96–108)
CO2 SERPL-SCNC: 26 MMOL/L (ref 21–32)
CREAT SERPL-MCNC: 0.68 MG/DL (ref 0.6–1.3)
EOSINOPHIL # BLD AUTO: 0.11 THOUSAND/ÂΜL (ref 0–0.61)
EOSINOPHIL NFR BLD AUTO: 3 % (ref 0–6)
ERYTHROCYTE [DISTWIDTH] IN BLOOD BY AUTOMATED COUNT: 13.9 % (ref 11.6–15.1)
FERRITIN SERPL-MCNC: 22 NG/ML (ref 11–307)
FOLATE SERPL-MCNC: 22 NG/ML
GFR SERPL CREATININE-BSD FRML MDRD: 108 ML/MIN/1.73SQ M
GLUCOSE P FAST SERPL-MCNC: 93 MG/DL (ref 65–99)
HCT VFR BLD AUTO: 41.7 % (ref 34.8–46.1)
HGB BLD-MCNC: 13.5 G/DL (ref 11.5–15.4)
IMM GRANULOCYTES # BLD AUTO: 0.01 THOUSAND/UL (ref 0–0.2)
IMM GRANULOCYTES NFR BLD AUTO: 0 % (ref 0–2)
IRON SATN MFR SERPL: 13 % (ref 15–50)
IRON SERPL-MCNC: 41 UG/DL (ref 50–212)
LYMPHOCYTES # BLD AUTO: 1.1 THOUSANDS/ÂΜL (ref 0.6–4.47)
LYMPHOCYTES NFR BLD AUTO: 25 % (ref 14–44)
MCH RBC QN AUTO: 27.4 PG (ref 26.8–34.3)
MCHC RBC AUTO-ENTMCNC: 32.4 G/DL (ref 31.4–37.4)
MCV RBC AUTO: 85 FL (ref 82–98)
MONOCYTES # BLD AUTO: 0.51 THOUSAND/ÂΜL (ref 0.17–1.22)
MONOCYTES NFR BLD AUTO: 12 % (ref 4–12)
NEUTROPHILS # BLD AUTO: 2.62 THOUSANDS/ÂΜL (ref 1.85–7.62)
NEUTS SEG NFR BLD AUTO: 59 % (ref 43–75)
NRBC BLD AUTO-RTO: 0 /100 WBCS
PLATELET # BLD AUTO: 233 THOUSANDS/UL (ref 149–390)
PMV BLD AUTO: 11 FL (ref 8.9–12.7)
POTASSIUM SERPL-SCNC: 3.1 MMOL/L (ref 3.5–5.3)
RBC # BLD AUTO: 4.93 MILLION/UL (ref 3.81–5.12)
SODIUM SERPL-SCNC: 137 MMOL/L (ref 135–147)
TIBC SERPL-MCNC: 322 UG/DL (ref 250–450)
UIBC SERPL-MCNC: 281 UG/DL (ref 155–355)
VIT B12 SERPL-MCNC: 234 PG/ML (ref 180–914)
WBC # BLD AUTO: 4.37 THOUSAND/UL (ref 4.31–10.16)

## 2024-04-20 PROCEDURE — 82728 ASSAY OF FERRITIN: CPT

## 2024-04-20 PROCEDURE — 85025 COMPLETE CBC W/AUTO DIFF WBC: CPT

## 2024-04-20 PROCEDURE — 36415 COLL VENOUS BLD VENIPUNCTURE: CPT

## 2024-04-20 PROCEDURE — 80048 BASIC METABOLIC PNL TOTAL CA: CPT

## 2024-04-20 PROCEDURE — 82607 VITAMIN B-12: CPT

## 2024-04-20 PROCEDURE — 82746 ASSAY OF FOLIC ACID SERUM: CPT

## 2024-04-20 PROCEDURE — 83550 IRON BINDING TEST: CPT

## 2024-04-20 PROCEDURE — 83540 ASSAY OF IRON: CPT

## 2024-04-22 DIAGNOSIS — E87.6 HYPOKALEMIA: Primary | ICD-10-CM

## 2024-04-22 RX ORDER — POTASSIUM CHLORIDE 20 MEQ/1
20 TABLET, EXTENDED RELEASE ORAL DAILY
Qty: 90 TABLET | Refills: 1 | Status: SHIPPED | OUTPATIENT
Start: 2024-04-22

## 2024-04-22 NOTE — RESULT ENCOUNTER NOTE
Please call her to let her know that her potassium level is still low at 3.1.  I am sending a potassium supplement to her pharmacy 20 mEq once daily.  I would like her to check another basic metabolic panel/BMP in 2 weeks.  I did send the medication to [facility] and I put another order in the system for the BMP.  Thank you

## 2024-04-25 ENCOUNTER — APPOINTMENT (OUTPATIENT)
Dept: LAB | Facility: HOSPITAL | Age: 44
End: 2024-04-25
Payer: COMMERCIAL

## 2024-04-25 DIAGNOSIS — E87.6 HYPOKALEMIA: Primary | ICD-10-CM

## 2024-04-25 LAB — TSH SERPL DL<=0.05 MIU/L-ACNC: 0.49 UIU/ML (ref 0.45–4.5)

## 2024-04-25 PROCEDURE — 36415 COLL VENOUS BLD VENIPUNCTURE: CPT

## 2024-04-25 PROCEDURE — 84443 ASSAY THYROID STIM HORMONE: CPT

## 2024-05-01 ENCOUNTER — TELEPHONE (OUTPATIENT)
Dept: FAMILY MEDICINE CLINIC | Facility: CLINIC | Age: 44
End: 2024-05-01

## 2024-05-01 DIAGNOSIS — E53.8 B12 DEFICIENCY: ICD-10-CM

## 2024-05-01 DIAGNOSIS — D50.0 IRON DEFICIENCY ANEMIA DUE TO CHRONIC BLOOD LOSS: ICD-10-CM

## 2024-05-01 NOTE — TELEPHONE ENCOUNTER
Contacted pt on 5/1 regarding their follow up appt scheduled for 6/13, PCP is not in office on this day, left a vm with the patient to call the office back and reschedule this appt

## 2024-05-02 RX ORDER — FOLIC ACID 1 MG/1
1000 TABLET ORAL DAILY
Qty: 30 TABLET | Refills: 0 | Status: SHIPPED | OUTPATIENT
Start: 2024-05-02

## 2024-05-03 ENCOUNTER — TELEPHONE (OUTPATIENT)
Dept: HEMATOLOGY ONCOLOGY | Facility: CLINIC | Age: 44
End: 2024-05-03

## 2024-05-03 NOTE — TELEPHONE ENCOUNTER
Called Serbian speaking patient to inquire about missing lab tests:    Occult Blood, Fecal Immunochemical    UA w Reflex to Microscopic w Reflex to Culture      Patient stated that when she completed the bloodwork she was menstruating and did not want her menstrual blood to interfere with the results of the urine ans fecal testing.    Patient agreed to return to the lab tomorrow 5/4/2024 for these 2 tests.

## 2024-05-04 ENCOUNTER — APPOINTMENT (OUTPATIENT)
Dept: LAB | Facility: HOSPITAL | Age: 44
End: 2024-05-04
Payer: COMMERCIAL

## 2024-05-04 LAB
BACTERIA UR QL AUTO: ABNORMAL /HPF
BILIRUB UR QL STRIP: NEGATIVE
CLARITY UR: CLEAR
COLOR UR: ABNORMAL
GLUCOSE UR STRIP-MCNC: NEGATIVE MG/DL
HEMOCCULT STL QL IA: NEGATIVE
HGB UR QL STRIP.AUTO: NEGATIVE
KETONES UR STRIP-MCNC: NEGATIVE MG/DL
LEUKOCYTE ESTERASE UR QL STRIP: NEGATIVE
NITRITE UR QL STRIP: NEGATIVE
NON-SQ EPI CELLS URNS QL MICRO: ABNORMAL /HPF
PH UR STRIP.AUTO: 6.5 [PH]
PROT UR STRIP-MCNC: ABNORMAL MG/DL
RBC #/AREA URNS AUTO: ABNORMAL /HPF
SP GR UR STRIP.AUTO: 1.02 (ref 1–1.04)
UROBILINOGEN UA: 4 MG/DL
WBC #/AREA URNS AUTO: ABNORMAL /HPF

## 2024-05-04 PROCEDURE — G0328 FECAL BLOOD SCRN IMMUNOASSAY: HCPCS

## 2024-05-04 PROCEDURE — 81001 URINALYSIS AUTO W/SCOPE: CPT

## 2024-05-04 PROCEDURE — 81003 URINALYSIS AUTO W/O SCOPE: CPT

## 2024-05-06 ENCOUNTER — OFFICE VISIT (OUTPATIENT)
Dept: HEMATOLOGY ONCOLOGY | Facility: CLINIC | Age: 44
End: 2024-05-06
Payer: COMMERCIAL

## 2024-05-06 VITALS
SYSTOLIC BLOOD PRESSURE: 120 MMHG | BODY MASS INDEX: 45.32 KG/M2 | OXYGEN SATURATION: 96 % | DIASTOLIC BLOOD PRESSURE: 72 MMHG | HEIGHT: 66 IN | RESPIRATION RATE: 17 BRPM | TEMPERATURE: 97.3 F | HEART RATE: 81 BPM | WEIGHT: 282 LBS

## 2024-05-06 DIAGNOSIS — Z84.81 FAMILY HISTORY OF BREAST CANCER GENE MUTATION IN FIRST DEGREE RELATIVE: ICD-10-CM

## 2024-05-06 DIAGNOSIS — E53.8 B12 DEFICIENCY: ICD-10-CM

## 2024-05-06 DIAGNOSIS — D50.8 IRON DEFICIENCY ANEMIA SECONDARY TO INADEQUATE DIETARY IRON INTAKE: Primary | ICD-10-CM

## 2024-05-06 DIAGNOSIS — N92.0 MENORRHAGIA WITH REGULAR CYCLE: ICD-10-CM

## 2024-05-06 PROCEDURE — 99215 OFFICE O/P EST HI 40 MIN: CPT | Performed by: PHYSICIAN ASSISTANT

## 2024-05-06 RX ORDER — FERROUS SULFATE 325(65) MG
325 TABLET ORAL
Qty: 90 TABLET | Refills: 3 | Status: SHIPPED | OUTPATIENT
Start: 2024-05-06 | End: 2025-05-06

## 2024-05-06 NOTE — PATIENT INSTRUCTIONS
[facility]  [place] - [phone]    Your Team Members:  Advanced Practitioner:  Geetha iWse PA-C    Please answer Private and Unavailable Calls - this may be your team(s) contacting you.  If you have medical questions/concerns/issues - contact us either by (1) My Chart (2) [facility]       B 12 extra strength gummy take 1 gummy daily for a total daily dose of 1500 mcg.  -[employer] or [employer]

## 2024-05-06 NOTE — PROGRESS NOTES
[address]  [facility] [place]  [address]  [facility] [id number]  Hematology Ambulatory Follow-Up  Maritza Butt, 1980, [id number]  5/6/2024      Assessment and Plan   1. Iron deficiency anemia secondary to inadequate dietary iron intake; 2. Menorrhagia with regular cycle  This is a 44-year-old female with past medical history of iron deficiency anemia with heavy menses.  Patient unfortunately was unable secondary to communication issues undergo IV iron supplementation however, she did take oral supplementation which has resulted with normalized hemoglobin however iron stores remain poor.  Considering the increase in hemoglobin and some improvement in iron stores patient wishes to continue on oral supplements at this time.  Patient is unable to get ferritin level above 30 ng/dL, with we will revisit IV iron supplementation.    I have recommended that the patient follow-up with gynecology to discuss heavy menstrual bleeding.    Regimen:  Ferrous sulfate 325 mg tablet by mouth daily.  Follow-up in 4 months with blood work prior.  If labs remain stable and improved, discharged from hematology office will occur.    - ferrous sulfate 325 (65 Fe) mg tablet; Take 1 tablet (325 mg total) by mouth daily with breakfast  Dispense: 90 tablet; Refill: 3  - Iron Panel (Includes Ferritin, Iron Sat%, Iron, and TIBC); Future  - Vitamin B12; Future  - Folate; Future  - CBC and differential; Future    3. B12 deficiency  Patient's B12 level remains in the 200 range.  Patient is taking an oral tablet and not a gummy.  We discussed the benefits of gummy over the tablet.  Patient will finish out current bottle and will transition oral gummy supplementation.    Recommendation:  B12 1500 mcg daily-gummy.    - Iron Panel (Includes Ferritin, Iron Sat%, Iron, and TIBC); Future  - Vitamin B12; Future  - Folate; Future  - CBC and differential; Future    4. Family history of breast  cancer gene mutation in first degree relative  Patient's personal cancer screening was negative-no genetic variants present.  BRCA1/2 Analyses with [employer] completed on 3/6/2024.      The patient is scheduled for follow-up in approximately 4 months.     Patient voiced agreement and understanding to the above.   Patient advised to call the Hematology/Oncology office with any questions and concerns regarding the above.    Barrier(s) to care: None  The patient is able to self care.    Geetha Wise PA-C  Medical Oncology/Hematology  [facility]    Subjective     Chief Complaint   Patient presents with    Follow-up       History of present illness:   This is a 43-year-old female with past medical history of obesity, depression, hypertension, postsurgical hypothyroidism following with endocrinology who presents to the hematology clinic for evaluation of iron deficiency anemia.     4/4/2017     WBC 7.28, hemoglobin 13.1, MCV 83, RDW 16.7, platelet 245  12/29/2018 WBC 6.0, hemoglobin 11.3, MCV 79, RDW 16.6, platelets 267  5/1/2020     WBC 4.6, hemoglobin 12.0, MCV 78, RDW 18.2, platelet 256  5/7/2021     WBC 4.4, hemoglobin 11.6, MCV 87 D8, RDW 17.2, platelet 283  3/7/22         WBC 6.1, hemoglobin 10.6, MCV 74, RDW 17.4, platelet 296  9/1/2023     WBC 5.4, hemoglobin 9.2, MCV 72, RDW 19.6, platelet 344  10/11/2023                          Ferritin = 9; started on oral iron supplements                          Iron saturation 10%, TIBC 369  11/6/2023   WBC 4.9, hemoglobin 11.8, MCV 80, RDW 20.0, platelet 298  Ferritin 21; recommended increasing oral supplements to twice a day with vitamin C  Iron saturation 12 TIBC 336     Menstrual history: Patient has noted crease in cycle length to approximately 21 days.  Occasionally patient will have her period twice a month.  Patient bleeds heavily for 3 days changing a pad or tampon every 2-3 hours.  Patient is not on birth  control.  Patient had tried it 25 years ago and has not gone back to it.  No tobacco use.     01/04/24: Mild stomach upset associated with nausea with oral iron.  Patient notes intermittent compliance.      Notes intermittent headaches some worse than others.  Fatigue.  No easy bruising.  Sleeping well.     No family history of colon cancer.  Patient has never had a colonoscopy.  Patient is up-to-date on Pap smear last completed in 9/23. Neg for high risk HPV    January appointment recommended IV iron supplementation however, this was never completed due to communication issue.  Patient continued on oral supplementation.    1/4/2024: WBC 5.1, hemoglobin 12.0, MCV 83, platelet count 252, ferritin 11   Iron saturation 12%, TIBC 381   B12 78, folate 10.9   Recommended B12 tablets plus folic acid 1 mg.    3/6/2024:  BRCA1/2 Analyses with [employer]® [employer]® completed on 3/6/2024.    Negative results.    4/20/2024 WBC 4.3, hemoglobin 13.5, MCV 85, platelet count 233, ferritin 22   Folate = 22, B12 234    5/4/2024: Fecal occult blood testing negative, urinalysis negative for blood.    Interval history:  feels better. Didn't follow up wih GYN.  Genetic screenings negative.     Review of Systems   Constitutional:  Negative for appetite change, fatigue, fever and unexpected weight change.   HENT:  Negative for nosebleeds.    Respiratory:  Negative for cough, choking and shortness of breath.         Negative hemoptysis.   Cardiovascular:  Negative for chest pain, palpitations and leg swelling.   Gastrointestinal: Negative.  Negative for abdominal distention, abdominal pain, anal bleeding, blood in stool, constipation, diarrhea, nausea and vomiting.   Endocrine: Negative.  Negative for cold intolerance.   Genitourinary:  Positive for menstrual problem. Negative for hematuria, vaginal bleeding, vaginal discharge and vaginal pain.   Musculoskeletal: Negative.  Negative for arthralgias, myalgias, neck pain and neck  stiffness.   Skin: Negative.  Negative for color change, pallor and rash.   Allergic/Immunologic: Negative.  Negative for immunocompromised state.   Neurological: Negative.  Negative for weakness and headaches.   Hematological:  Negative for adenopathy. Does not bruise/bleed easily.   All other systems reviewed and are negative.      Patient Active Problem List   Diagnosis    Essential hypertension    Vitamin D deficiency    Iron deficiency anemia secondary to inadequate dietary iron intake    Hypokalemia    Dysfunctional uterine bleeding    Varicose veins of both legs with edema    Obesity, Class III, BMI 40-49.9 (morbid obesity) ([facility])    Hypothyroidism following radioiodine therapy    Obstructive sleep apnea treated with continuous positive airway pressure (CPAP)    Left leg paresthesias    Primary hypertriglyceridemia    Encounter for screening mammogram for malignant neoplasm of breast    Acute right ankle pain    Family history of breast cancer gene mutation in first degree relative    B12 deficiency    History of Graves' disease    Family history of diabetes mellitus type II    Neck mass    Abnormal ultrasound of neck     Past Medical History:   Diagnosis Date    Class 3 severe obesity due to excess calories with serious comorbidity and body mass index (BMI) of 45.0 to 49.9 in adult ([facility]) 12/10/2018    Depression     Disease of thyroid gland     Edema of both legs 2012    Hypertension     Iron deficiency anemia secondary to inadequate dietary iron intake 1/2/2019    Macrocytosis 2012    Vitamin D deficiency      Past Surgical History:   Procedure Laterality Date    THYROID SURGERY      ablation    TUBAL LIGATION Bilateral 2009     Family History   Problem Relation Age of Onset    Breast cancer Mother 37    No Known Problems Father     No Known Problems Sister     No Known Problems Daughter     Breast cancer Maternal Grandmother 45    No Known Problems Maternal Grandfather     No Known Problems Paternal  Grandmother     No Known Problems Paternal Grandfather     Breast cancer Maternal Aunt 37    Breast cancer Maternal Aunt 57    No Known Problems Maternal Aunt     No Known Problems Maternal Aunt     No Known Problems Maternal Aunt     Breast cancer Cousin     Breast cancer Family     No Known Problems Paternal Aunt      Social History     Socioeconomic History    Marital status: Single     Spouse name: Not on file    Number of children: Not on file    Years of education: Not on file    Highest education level: Not on file   Occupational History    Not on file   Tobacco Use    Smoking status: Never    Smokeless tobacco: Never   Vaping Use    Vaping status: Never Used   Substance and Sexual Activity    Alcohol use: Yes    Drug use: No    Sexual activity: Yes     Partners: Male     Birth control/protection: Female Sterilization   Other Topics Concern    Not on file   Social History Narrative    Not on file     Social Determinants of Health     Financial Resource Strain: Not on file   Food Insecurity: Not on file   Transportation Needs: Not on file   Physical Activity: Not on file   Stress: Not on file   Social Connections: Not on file   Intimate Partner Violence: Not on file   Housing Stability: Not on file       Current Outpatient Medications:     ascorbic acid (VITAMIN C) 500 MG tablet, Take 500 mg by mouth daily, Disp: , Rfl:     ergocalciferol (VITAMIN D2) 50,000 units, Take 50,000 Units by mouth once a week, Disp: , Rfl:     ferrous sulfate 325 (65 Fe) mg tablet, Take 1 tablet (325 mg total) by mouth daily with breakfast, Disp: 90 tablet, Rfl: 3    folic acid (FOLVITE) 1 mg tablet, Take 1 tablet by mouth once daily, Disp: 30 tablet, Rfl: 0    hydrochlorothiazide (HYDRODIURIL) 25 mg tablet, Take 1 tablet (25 mg total) by mouth daily, Disp: 90 tablet, Rfl: 1    levothyroxine 150 mcg tablet, TAKE 1 TABLET (150 MCG TOTAL) BY MOUTH DAILY. ONLY ON SUNDAY MORNING, Disp: , Rfl:     levothyroxine 175 mcg tablet, Take 1  "tablet (175 mcg total) by mouth daily, Disp: 90 tablet, Rfl: 1    potassium chloride (Klor-Con M20) 20 mEq tablet, Take 1 tablet (20 mEq total) by mouth daily, Disp: 90 tablet, Rfl: 1    vitamin B-12 (VITAMIN B-12) 1,000 mcg tablet, Take 1 tablet (1,000 mcg total) by mouth daily, Disp: 90 tablet, Rfl: 1    Zepbound 5 MG/0.5ML auto-injector, INJECT 5 MG UNDER THE SKIN EVERY 7 DAYS., Disp: , Rfl:   No Known Allergies    Objective   /72 (BP Location: Right arm, Patient Position: Sitting, Cuff Size: Adult)   Pulse 81   Temp (!) 97.3 °F (36.3 °C)   Resp 17   Ht 5' 6\" (1.676 m)   Wt 128 kg (282 lb)   SpO2 96%   BMI 45.52 kg/m²    Physical Exam  Constitutional:       General: She is not in acute distress.     Appearance: She is well-developed. She is obese.   HENT:      Head: Normocephalic and atraumatic.   Eyes:      General: No scleral icterus.     Conjunctiva/sclera: Conjunctivae normal.   Cardiovascular:      Rate and Rhythm: Normal rate.   Pulmonary:      Effort: Pulmonary effort is normal. No respiratory distress.   Skin:     General: Skin is warm.      Coloration: Skin is not pale.      Findings: No rash.   Neurological:      Mental Status: She is alert and oriented to person, place, and time.   Psychiatric:         Thought Content: Thought content normal.         Result Review  Labs:  Appointment on 04/25/2024   Component Date Value Ref Range Status    TSH 3RD GENERATON 04/25/2024 0.494  0.450 - 4.500 uIU/mL Final    The recommended reference ranges for TSH during pregnancy are as follows:   First trimester 0.100 to 2.500 uIU/mL   Second trimester  0.200 to 3.000 uIU/mL   Third trimester 0.300 to 3.000 uIU/m    Note: Normal ranges may not apply to patients who are transgender, non-binary, or whose legal sex, sex at birth, and gender identity differ.  Adult TSH (3rd generation) reference range follows the recommended guidelines of the American Thyroid Association, January, 2020.   Lab on 04/20/2024 "   Component Date Value Ref Range Status    WBC 04/20/2024 4.37  4.31 - 10.16 Thousand/uL Final    RBC 04/20/2024 4.93  3.81 - 5.12 Million/uL Final    Hemoglobin 04/20/2024 13.5  11.5 - 15.4 g/dL Final    Hematocrit 04/20/2024 41.7  34.8 - 46.1 % Final    MCV 04/20/2024 85  82 - 98 fL Final    MCH 04/20/2024 27.4  26.8 - 34.3 pg Final    MCHC 04/20/2024 32.4  31.4 - 37.4 g/dL Final    RDW 04/20/2024 13.9  11.6 - 15.1 % Final    MPV 04/20/2024 11.0  8.9 - 12.7 fL Final    Platelets 04/20/2024 233  149 - 390 Thousands/uL Final    nRBC 04/20/2024 0  /100 WBCs Final    Segmented % 04/20/2024 59  43 - 75 % Final    Immature Grans % 04/20/2024 0  0 - 2 % Final    Lymphocytes % 04/20/2024 25  14 - 44 % Final    Monocytes % 04/20/2024 12  4 - 12 % Final    Eosinophils Relative 04/20/2024 3  0 - 6 % Final    Basophils Relative 04/20/2024 1  0 - 1 % Final    Absolute Neutrophils 04/20/2024 2.62  1.85 - 7.62 Thousands/µL Final    Absolute Immature Grans 04/20/2024 0.01  0.00 - 0.20 Thousand/uL Final    Absolute Lymphocytes 04/20/2024 1.10  0.60 - 4.47 Thousands/µL Final    Absolute Monocytes 04/20/2024 0.51  0.17 - 1.22 Thousand/µL Final    Eosinophils Absolute 04/20/2024 0.11  0.00 - 0.61 Thousand/µL Final    Basophils Absolute 04/20/2024 0.02  0.00 - 0.10 Thousands/µL Final    Vitamin B-12 04/20/2024 234  180 - 914 pg/mL Final    Folate 04/20/2024 22.0  >5.9 ng/mL Final    The World Health Organization has determined deficient folate concentrations are considered to be <4.0 ng/mL.    Sodium 04/20/2024 137  135 - 147 mmol/L Final    Potassium 04/20/2024 3.1 (L)  3.5 - 5.3 mmol/L Final    Chloride 04/20/2024 102  96 - 108 mmol/L Final    CO2 04/20/2024 26  21 - 32 mmol/L Final    ANION GAP 04/20/2024 9  4 - 13 mmol/L Final    BUN 04/20/2024 12  5 - 25 mg/dL Final    Creatinine 04/20/2024 0.68  0.60 - 1.30 mg/dL Final    Standardized to IDMS reference method    Glucose, Fasting 04/20/2024 93  65 - 99 mg/dL Final    Calcium  04/20/2024 9.0  8.4 - 10.2 mg/dL Final    eGFR 04/20/2024 108  ml/min/1.73sq m Final    Iron Saturation 04/20/2024 13 (L)  15 - 50 % Final    TIBC 04/20/2024 322  250 - 450 ug/dL Final    Iron 04/20/2024 41 (L)  50 - 212 ug/dL Final    Patients treated with metal-binding drugs (ie. Deferoxamine) may have depressed iron values.    UIBC 04/20/2024 281  155 - 355 ug/dL Final    Ferritin 04/20/2024 22  11 - 307 ng/mL Final       Please note:  This report has been generated by a voice recognition software system. Therefore there may be syntax, spelling, and/or grammatical errors. Please call if you have any questions.

## 2024-05-16 ENCOUNTER — HOSPITAL ENCOUNTER (OUTPATIENT)
Dept: MAMMOGRAPHY | Facility: CLINIC | Age: 44
End: 2024-05-16
Payer: COMMERCIAL

## 2024-05-16 VITALS — BODY MASS INDEX: 45.32 KG/M2 | HEIGHT: 66 IN | WEIGHT: 282 LBS

## 2024-05-16 DIAGNOSIS — R92.8 ABNORMAL MAMMOGRAM: ICD-10-CM

## 2024-05-16 PROCEDURE — 76642 ULTRASOUND BREAST LIMITED: CPT

## 2024-05-16 PROCEDURE — 77066 DX MAMMO INCL CAD BI: CPT

## 2024-05-16 PROCEDURE — G0279 TOMOSYNTHESIS, MAMMO: HCPCS

## 2024-05-16 NOTE — PROGRESS NOTES
Met with patient and Dr. Kocher regarding recommendation for;      ___x__ RIGHT __x____LEFT      _x____Ultrasound guided  ______Stereotactic  Breast biopsy.      ___x__Verbalized understanding.      Blood thinners:  _____yes ___x__no    Date stopped: ___________    Biopsy teaching sheet given and reviewed with patient verbalizing understanding and questions/concerns addressed at this time:  ____x___yes ______no

## 2024-05-30 ENCOUNTER — HOSPITAL ENCOUNTER (OUTPATIENT)
Dept: ULTRASOUND IMAGING | Facility: CLINIC | Age: 44
Discharge: HOME | End: 2024-05-30
Payer: COMMERCIAL

## 2024-05-30 ENCOUNTER — HOSPITAL ENCOUNTER (OUTPATIENT)
Dept: MAMMOGRAPHY | Facility: CLINIC | Age: 44
Discharge: HOME | End: 2024-05-30
Payer: COMMERCIAL

## 2024-05-30 VITALS — DIASTOLIC BLOOD PRESSURE: 76 MMHG | HEART RATE: 76 BPM | SYSTOLIC BLOOD PRESSURE: 114 MMHG

## 2024-05-30 DIAGNOSIS — R92.8 ABNORMAL ULTRASOUND OF BREAST: ICD-10-CM

## 2024-05-30 PROCEDURE — 88305 TISSUE EXAM BY PATHOLOGIST: CPT | Performed by: PATHOLOGY

## 2024-05-30 PROCEDURE — 19084 BX BREAST ADD LESION US IMAG: CPT

## 2024-05-30 PROCEDURE — 19083 BX BREAST 1ST LESION US IMAG: CPT

## 2024-05-30 PROCEDURE — A4648 IMPLANTABLE TISSUE MARKER: HCPCS

## 2024-05-30 RX ORDER — LIDOCAINE HYDROCHLORIDE 10 MG/ML
5 INJECTION, SOLUTION EPIDURAL; INFILTRATION; INTRACAUDAL; PERINEURAL ONCE
Status: COMPLETED | OUTPATIENT
Start: 2024-05-30 | End: 2024-05-30

## 2024-05-30 RX ADMIN — LIDOCAINE HYDROCHLORIDE 5 ML: 10 INJECTION, SOLUTION EPIDURAL; INFILTRATION; INTRACAUDAL; PERINEURAL at 10:59

## 2024-05-30 RX ADMIN — LIDOCAINE HYDROCHLORIDE 5 ML: 10 INJECTION, SOLUTION EPIDURAL; INFILTRATION; INTRACAUDAL; PERINEURAL at 10:48

## 2024-05-30 NOTE — PROGRESS NOTES
Procedure type: Site 1    ___x__ultrasound guided _____stereotactic    Breast:    __x___Left _____Right    Location: 10 o'clock 9cmfn    Needle: 12G    # of passes: 4    Clip: Open Coil      Procedure type: Site 2    __x___ultrasound guided _____stereotactic    Breast:    _____Left ___x__Right    Location: 12 o'clock 9cmfn    Needle: 12G    # of passes: 3    Clip: Butterfly      Performed by: Dr. Dietrich    Pressure held for 5 minutes by: Maricel Rocha Strips:    ___X__yes _____no    Band aid:    __X___yes_____no    Tolerated procedure:    __X___yes _____no

## 2024-05-31 ENCOUNTER — TELEPHONE (OUTPATIENT)
Dept: MAMMOGRAPHY | Facility: CLINIC | Age: 44
End: 2024-05-31

## 2024-05-31 PROCEDURE — 88305 TISSUE EXAM BY PATHOLOGIST: CPT | Performed by: PATHOLOGY

## 2024-05-31 NOTE — PROGRESS NOTES
Post procedure call completed    Bleeding: _____yes __X___no (Pt denies)    Pain: _____yes ___X___no (Pt denies)    Redness/Swelling: ______yes ___X___no (Pt denies)    Band aid removed: _____yes ___X__no (discussed removing when she showers)    Steri-Strips intact: ___X___yes _____no (discussed with patient to remove steri strips on Tuesday if they have not come off on their own)    Pt with no questions at this time, adv will call when results available, adv to call with any questions or concerns, has name/# for contact

## 2024-06-10 ENCOUNTER — APPOINTMENT (OUTPATIENT)
Dept: LAB | Facility: HOSPITAL | Age: 44
End: 2024-06-10
Payer: COMMERCIAL

## 2024-06-10 DIAGNOSIS — N92.0 MENORRHAGIA WITH REGULAR CYCLE: ICD-10-CM

## 2024-06-10 DIAGNOSIS — D50.8 IRON DEFICIENCY ANEMIA SECONDARY TO INADEQUATE DIETARY IRON INTAKE: ICD-10-CM

## 2024-06-10 DIAGNOSIS — E53.8 B12 DEFICIENCY: ICD-10-CM

## 2024-06-10 LAB
ANION GAP SERPL CALCULATED.3IONS-SCNC: 5 MMOL/L (ref 4–13)
BUN SERPL-MCNC: 12 MG/DL (ref 5–25)
CALCIUM SERPL-MCNC: 8.8 MG/DL (ref 8.4–10.2)
CHLORIDE SERPL-SCNC: 105 MMOL/L (ref 96–108)
CO2 SERPL-SCNC: 27 MMOL/L (ref 21–32)
CREAT SERPL-MCNC: 0.67 MG/DL (ref 0.6–1.3)
GFR SERPL CREATININE-BSD FRML MDRD: 108 ML/MIN/1.73SQ M
GLUCOSE P FAST SERPL-MCNC: 90 MG/DL (ref 65–99)
POTASSIUM SERPL-SCNC: 4.1 MMOL/L (ref 3.5–5.3)
SODIUM SERPL-SCNC: 137 MMOL/L (ref 135–147)

## 2024-06-10 PROCEDURE — 80048 BASIC METABOLIC PNL TOTAL CA: CPT

## 2024-06-27 ENCOUNTER — TELEPHONE (OUTPATIENT)
Dept: FAMILY MEDICINE CLINIC | Facility: CLINIC | Age: 44
End: 2024-06-27

## 2024-07-08 ENCOUNTER — OFFICE VISIT (OUTPATIENT)
Dept: FAMILY MEDICINE CLINIC | Facility: CLINIC | Age: 44
End: 2024-07-08
Payer: COMMERCIAL

## 2024-07-08 VITALS
HEART RATE: 75 BPM | WEIGHT: 275 LBS | TEMPERATURE: 97.2 F | OXYGEN SATURATION: 97 % | HEIGHT: 66 IN | DIASTOLIC BLOOD PRESSURE: 62 MMHG | BODY MASS INDEX: 44.2 KG/M2 | RESPIRATION RATE: 16 BRPM | SYSTOLIC BLOOD PRESSURE: 112 MMHG

## 2024-07-08 DIAGNOSIS — I10 ESSENTIAL HYPERTENSION: Primary | ICD-10-CM

## 2024-07-08 DIAGNOSIS — E87.6 HYPOKALEMIA: ICD-10-CM

## 2024-07-08 DIAGNOSIS — E78.1 PRIMARY HYPERTRIGLYCERIDEMIA: ICD-10-CM

## 2024-07-08 DIAGNOSIS — D50.8 IRON DEFICIENCY ANEMIA SECONDARY TO INADEQUATE DIETARY IRON INTAKE: ICD-10-CM

## 2024-07-08 DIAGNOSIS — E53.8 B12 DEFICIENCY: ICD-10-CM

## 2024-07-08 DIAGNOSIS — E89.0 HYPOTHYROIDISM FOLLOWING RADIOIODINE THERAPY: ICD-10-CM

## 2024-07-08 PROCEDURE — 99214 OFFICE O/P EST MOD 30 MIN: CPT | Performed by: PHYSICIAN ASSISTANT

## 2024-07-08 RX ORDER — HYDROCHLOROTHIAZIDE 25 MG/1
25 TABLET ORAL DAILY
Qty: 90 TABLET | Refills: 1 | Status: SHIPPED | OUTPATIENT
Start: 2024-07-08

## 2024-07-08 NOTE — PROGRESS NOTES
Ambulatory Visit  Name: Maritza Butt      : 1980      MRN: [id number]  Encounter Provider: Christine Coello PA-C  Encounter Date: 2024   Encounter department: [facility]    Assessment & Plan   1. Essential hypertension  -     hydroCHLOROthiazide 25 mg tablet; Take 1 tablet (25 mg total) by mouth daily  2. Hypothyroidism following radioiodine therapy  3. Iron deficiency anemia secondary to inadequate dietary iron intake  4. B12 deficiency  5. Hypokalemia  6. Primary hypertriglyceridemia    -BMP results done on 6/10/2024 have been reviewed  - Potassium levels at 4.1.    Continue potassium chloride 20 mEq daily  - Continue hydrochlorothiazide 25 mg daily  - She continues on an iron supplement and vitamin C  - She continues to levothyroxine 150 mcg and the 175 g as directed by Dr. Molina  - She does take vitamin B12 over-the-counter 1000 mcg daily  - She does have a pending CBC and vitamin B12 level along with an iron panel in the system that she plans on doing in the near future  - I did recommend follow-up in 5 months to include her annual physical    M*Modal software was used to dictate this note. It may contain errors with dictating incorrect words/spelling. Please contact provider directly for any questions.        History of Present Illness     Patient presents today for routine follow-up for hypertension and hypothyroidism.  She does continue to follow with Dr. Molina for her thyroid.  She states she did have a recent mammogram and breast biopsy and is scheduled again for follow-up testing in November.        Review of Systems   Constitutional: Negative.    Respiratory:  Negative for cough and shortness of breath.    Cardiovascular:  Negative for chest pain and leg swelling.       Objective     /62 (BP Location: Right arm, Patient Position: Sitting, Cuff Size: Standard)   Pulse 75   Temp (!) 97.2 °F (36.2 °C) (Temporal)   Resp 16    "Ht 5' 6\" (1.676 m)   Wt 125 kg (275 lb)   SpO2 97%   BMI 44.39 kg/m²     Physical Exam  Vitals reviewed.   Constitutional:       General: She is not in acute distress.     Appearance: Normal appearance. She is well-developed. She is not ill-appearing, toxic-appearing or diaphoretic.   HENT:      Head: Normocephalic and atraumatic.   Neck:      Thyroid: No thyromegaly.   Cardiovascular:      Rate and Rhythm: Normal rate and regular rhythm.      Heart sounds: Normal heart sounds. No murmur heard.  Pulmonary:      Effort: Pulmonary effort is normal. No respiratory distress.      Breath sounds: Normal breath sounds. No wheezing, rhonchi or rales.   Abdominal:      General: Bowel sounds are normal.      Palpations: Abdomen is soft. There is no mass.      Tenderness: There is no abdominal tenderness.   Musculoskeletal:         General: No deformity.      Cervical back: Neck supple.      Right lower leg: No edema.      Left lower leg: No edema.   Lymphadenopathy:      Cervical: No cervical adenopathy.   Skin:     General: Skin is warm.   Neurological:      General: No focal deficit present.      Mental Status: She is alert.   Psychiatric:         Mood and Affect: Mood normal.         Behavior: Behavior normal.         Thought Content: Thought content normal.         Judgment: Judgment normal.       Administrative Statements     "

## 2024-07-22 ENCOUNTER — APPOINTMENT (OUTPATIENT)
Dept: LAB | Facility: HOSPITAL | Age: 44
End: 2024-07-22
Payer: COMMERCIAL

## 2024-07-22 DIAGNOSIS — E89.0 POSTSURGICAL HYPOTHYROIDISM: ICD-10-CM

## 2024-07-22 LAB
T4 FREE SERPL-MCNC: 0.75 NG/DL (ref 0.61–1.12)
TSH SERPL DL<=0.05 MIU/L-ACNC: 2.55 UIU/ML (ref 0.45–4.5)

## 2024-07-22 PROCEDURE — 84439 ASSAY OF FREE THYROXINE: CPT

## 2024-07-22 PROCEDURE — 84443 ASSAY THYROID STIM HORMONE: CPT

## 2024-07-22 PROCEDURE — 36415 COLL VENOUS BLD VENIPUNCTURE: CPT

## 2024-09-04 ENCOUNTER — TELEPHONE (OUTPATIENT)
Dept: HEMATOLOGY ONCOLOGY | Facility: CLINIC | Age: 44
End: 2024-09-04

## 2024-09-04 DIAGNOSIS — D50.0 IRON DEFICIENCY ANEMIA DUE TO CHRONIC BLOOD LOSS: ICD-10-CM

## 2024-09-04 DIAGNOSIS — E53.8 B12 DEFICIENCY: Primary | ICD-10-CM

## 2024-09-04 DIAGNOSIS — D50.8 IRON DEFICIENCY ANEMIA SECONDARY TO INADEQUATE DIETARY IRON INTAKE: ICD-10-CM

## 2024-09-04 NOTE — TELEPHONE ENCOUNTER
Called and spoke with patient to remind her to have her labs collected prior to her 9/6 appointment with DESIRAE Wise.    Patient mentioned that she planned on having labs collected tomorrow 9/5.

## 2024-09-05 ENCOUNTER — APPOINTMENT (OUTPATIENT)
Dept: LAB | Facility: HOSPITAL | Age: 44
End: 2024-09-05
Payer: COMMERCIAL

## 2024-09-05 DIAGNOSIS — E53.8 B12 DEFICIENCY: ICD-10-CM

## 2024-09-05 DIAGNOSIS — D50.8 IRON DEFICIENCY ANEMIA SECONDARY TO INADEQUATE DIETARY IRON INTAKE: ICD-10-CM

## 2024-09-05 DIAGNOSIS — D50.0 IRON DEFICIENCY ANEMIA DUE TO CHRONIC BLOOD LOSS: ICD-10-CM

## 2024-09-05 LAB
BASOPHILS # BLD AUTO: 0.03 THOUSANDS/ÂΜL (ref 0–0.1)
BASOPHILS NFR BLD AUTO: 1 % (ref 0–1)
EOSINOPHIL # BLD AUTO: 0.23 THOUSAND/ÂΜL (ref 0–0.61)
EOSINOPHIL NFR BLD AUTO: 4 % (ref 0–6)
ERYTHROCYTE [DISTWIDTH] IN BLOOD BY AUTOMATED COUNT: 13 % (ref 11.6–15.1)
FERRITIN SERPL-MCNC: 27 NG/ML (ref 24–307)
FOLATE SERPL-MCNC: 14.4 NG/ML
HCT VFR BLD AUTO: 40.9 % (ref 36.5–46.1)
HGB BLD-MCNC: 13.5 G/DL (ref 12–15.4)
IMM GRANULOCYTES # BLD AUTO: 0.01 THOUSAND/UL (ref 0–0.2)
IMM GRANULOCYTES NFR BLD AUTO: 0 % (ref 0–2)
IRON SATN MFR SERPL: 31 % (ref 15–50)
IRON SERPL-MCNC: 96 UG/DL (ref 50–212)
LYMPHOCYTES # BLD AUTO: 0.79 THOUSANDS/ÂΜL (ref 0.6–4.47)
LYMPHOCYTES NFR BLD AUTO: 15 % (ref 14–44)
MCH RBC QN AUTO: 29.3 PG (ref 26.8–34.3)
MCHC RBC AUTO-ENTMCNC: 33 G/DL (ref 31.4–37.4)
MCV RBC AUTO: 89 FL (ref 82–98)
MONOCYTES # BLD AUTO: 0.44 THOUSAND/ÂΜL (ref 0.17–1.22)
MONOCYTES NFR BLD AUTO: 9 % (ref 4–12)
NEUTROPHILS # BLD AUTO: 3.67 THOUSANDS/ÂΜL (ref 1.85–7.62)
NEUTS SEG NFR BLD AUTO: 71 % (ref 43–75)
NRBC BLD AUTO-RTO: 0 /100 WBCS
PLATELET # BLD AUTO: 214 THOUSANDS/UL (ref 149–390)
PMV BLD AUTO: 11 FL (ref 8.9–12.7)
RBC # BLD AUTO: 4.61 MILLION/UL (ref 3.88–5.12)
TIBC SERPL-MCNC: 312 UG/DL (ref 250–450)
UIBC SERPL-MCNC: 216 UG/DL (ref 155–355)
VIT B12 SERPL-MCNC: 76 PG/ML (ref 180–914)
WBC # BLD AUTO: 5.17 THOUSAND/UL (ref 4.31–10.16)

## 2024-09-06 ENCOUNTER — TELEPHONE (OUTPATIENT)
Dept: HEMATOLOGY ONCOLOGY | Facility: CLINIC | Age: 44
End: 2024-09-06

## 2024-09-06 ENCOUNTER — OFFICE VISIT (OUTPATIENT)
Dept: HEMATOLOGY ONCOLOGY | Facility: CLINIC | Age: 44
End: 2024-09-06
Payer: COMMERCIAL

## 2024-09-06 VITALS
HEART RATE: 71 BPM | BODY MASS INDEX: 44.03 KG/M2 | RESPIRATION RATE: 18 BRPM | SYSTOLIC BLOOD PRESSURE: 116 MMHG | DIASTOLIC BLOOD PRESSURE: 72 MMHG | WEIGHT: 274 LBS | HEIGHT: 66 IN | TEMPERATURE: 97.8 F | OXYGEN SATURATION: 100 %

## 2024-09-06 DIAGNOSIS — Z84.81 FAMILY HISTORY OF BREAST CANCER GENE MUTATION IN FIRST DEGREE RELATIVE: ICD-10-CM

## 2024-09-06 DIAGNOSIS — D50.0 IRON DEFICIENCY ANEMIA DUE TO CHRONIC BLOOD LOSS: ICD-10-CM

## 2024-09-06 DIAGNOSIS — N92.0 MENORRHAGIA WITH REGULAR CYCLE: ICD-10-CM

## 2024-09-06 DIAGNOSIS — E53.8 B12 DEFICIENCY: Primary | ICD-10-CM

## 2024-09-06 PROCEDURE — 99214 OFFICE O/P EST MOD 30 MIN: CPT | Performed by: PHYSICIAN ASSISTANT

## 2024-09-06 RX ORDER — CYANOCOBALAMIN 1000 UG/ML
1000 INJECTION, SOLUTION INTRAMUSCULAR; SUBCUTANEOUS ONCE
Status: CANCELLED | OUTPATIENT
Start: 2024-09-13

## 2024-09-06 NOTE — TELEPHONE ENCOUNTER
Good morning,                          Please schedule patient for infusions per provider.        Thank you

## 2024-09-06 NOTE — PROGRESS NOTES
[address]  [facility] HEMATOLOG[facility] [place]  [address]  [facility] [id number]  Hematology Ambulatory Follow-Up  Maritza Butt, 1980, [id number]  9/6/2024      Assessment and Plan   1. B12 deficiency  Patient's B12 level is lower than before.  Patient is not complaint taking gummy supplement.    We discussed options.  Patient believes that the gummy she has at home is 1000 mcg.  I have advised her to take 2 Gummies daily until follow-up in 3 months.  Furthermore, due to low levels of B12, IM injections were recommended today patient will undergo weekly injections at [facility] for 6 weeks.    Recommendation:  B12 2000 mcg daily-gummy  +  B12 1000 IM injection weekly x 6    - Vitamin B12; Future  - Folate; Future  - CBC and differential; Future    2. Menorrhagia with regular cycle; 3. Iron deficiency anemia due to chronic blood loss  Past medical history of iron deficiency anemia with heavy menses.  Patient unfortunately was unable secondary to communication issues undergo IV iron supplementation however, she did take oral supplementation which has resulted with normalized hemoglobin however iron stores remain poor.  Considering the increase in hemoglobin and some improvement in iron stores patient wishes to continue on oral supplements at this time.  Patient is unable to get ferritin level above 30 ng/dL, with we will revisit IV iron supplementation.    I have recommended that the patient follow-up with gynecology, no recent OV visit.      Regimen:  Ferrous sulfate 325 mg tablet by mouth daily.    - Iron Panel (Includes Ferritin, Iron Sat%, Iron, and TIBC); Future  - Vitamin B12; Future  - Folate; Future  - CBC and differential; Future    4. Family history of breast cancer gene mutation in first degree relative  Patient's personal cancer screening was negative-no genetic variants present.  BRCA1/2 Analyses with [employer]® +Endransight® completed  on 3/6/2024.    The patient is scheduled for follow-up in approximately 3 months.     Patient voiced agreement and understanding to the above.   Patient advised to call the Hematology/Oncology office with any questions and concerns regarding the above.    Barrier(s) to care: None  The patient is able to self care.    Geetha Wise PA-C  Medical Oncology/Hematology  [facility]    Subjective     Chief Complaint   Patient presents with    Follow-up       History of present illness:   This is a 44-year-old female with past medical history of obesity, depression, hypertension, postsurgical hypothyroidism following with endocrinology who presents to the hematology clinic for evaluation of iron deficiency anemia.     4/4/2017     WBC 7.28, hemoglobin 13.1, MCV 83, RDW 16.7, platelet 245  12/29/2018 WBC 6.0, hemoglobin 11.3, MCV 79, RDW 16.6, platelets 267  5/1/2020     WBC 4.6, hemoglobin 12.0, MCV 78, RDW 18.2, platelet 256  5/7/2021     WBC 4.4, hemoglobin 11.6, MCV 87 D8, RDW 17.2, platelet 283  3/7/22         WBC 6.1, hemoglobin 10.6, MCV 74, RDW 17.4, platelet 296  9/1/2023     WBC 5.4, hemoglobin 9.2, MCV 72, RDW 19.6, platelet 344  10/11/2023                          Ferritin = 9; started on oral iron supplements                          Iron saturation 10%, TIBC 369  11/6/2023   WBC 4.9, hemoglobin 11.8, MCV 80, RDW 20.0, platelet 298  Ferritin 21; recommended increasing oral supplements to twice a day with vitamin C  Iron saturation 12 TIBC 336     Menstrual history: Patient has noted crease in cycle length to approximately 21 days.  Occasionally patient will have her period twice a month.  Patient bleeds heavily for 3 days changing a pad or tampon every 2-3 hours.  Patient is not on birth control.  Patient had tried it 25 years ago and has not gone back to it.  No tobacco use.     01/04/24: Mild stomach upset associated with nausea with oral iron.  Patient notes intermittent  compliance.      Notes intermittent headaches some worse than others.  Fatigue.  No easy bruising.  Sleeping well.     No family history of colon cancer.  Patient has never had a colonoscopy.  Patient is up-to-date on Pap smear last completed in 9/23. Neg for high risk HPV    January appointment recommended IV iron supplementation however, this was never completed due to communication issue.  Patient continued on oral supplementation.    1/4/2024: WBC 5.1, hemoglobin 12.0, MCV 83, platelet count 252, ferritin 11   Iron saturation 12%, TIBC 381   B12 78, folate 10.9   Recommended B12 tablets plus folic acid 1 mg.    3/6/2024:  BRCA1/2 Analyses with [employer]Can[employer]® +RNAinsight® completed on 3/6/2024.    Negative results.    4/20/2024 WBC 4.3, hemoglobin 13.5, MCV 85, platelet count 233, ferritin 22   Folate = 22, B12 234    5/4/2024: Fecal occult blood testing negative, urinalysis negative for blood.    9/5/2024 WBC 5.17, hemoglobin 13.5, hematocrit 40.9, platelet count 212   Iron saturation 31%, ferritin 27.   B12 76, folate 14    Interval history:  feels better. Not taking B 12 meds.     Review of Systems   Constitutional:  Negative for appetite change, fatigue, fever and unexpected weight change.   HENT:  Negative for nosebleeds.    Respiratory:  Negative for cough, choking and shortness of breath.         Negative hemoptysis.   Cardiovascular:  Negative for chest pain, palpitations and leg swelling.   Gastrointestinal: Negative.  Negative for abdominal distention, abdominal pain, anal bleeding, blood in stool, constipation, diarrhea, nausea and vomiting.   Endocrine: Negative.  Negative for cold intolerance.   Genitourinary:  Positive for menstrual problem. Negative for hematuria, vaginal bleeding, vaginal discharge and vaginal pain.   Musculoskeletal: Negative.  Negative for arthralgias, myalgias, neck pain and neck stiffness.   Skin: Negative.  Negative for color change, pallor and rash.    Allergic/Immunologic: Negative.  Negative for immunocompromised state.   Neurological: Negative.  Negative for weakness and headaches.   Hematological:  Negative for adenopathy. Does not bruise/bleed easily.   All other systems reviewed and are negative.      Patient Active Problem List   Diagnosis    Essential hypertension    Vitamin D deficiency    Iron deficiency anemia secondary to inadequate dietary iron intake    Hypokalemia    Dysfunctional uterine bleeding    Varicose veins of both legs with edema    Obesity, Class III, BMI 40-49.9 (morbid obesity) ([facility])    Hypothyroidism following radioiodine therapy    Obstructive sleep apnea treated with continuous positive airway pressure (CPAP)    Left leg paresthesias    Primary hypertriglyceridemia    Encounter for screening mammogram for malignant neoplasm of breast    Acute right ankle pain    Family history of breast cancer gene mutation in first degree relative    B12 deficiency    History of Graves' disease    Family history of diabetes mellitus type II    Neck mass    Abnormal ultrasound of neck     Past Medical History:   Diagnosis Date    Class 3 severe obesity due to excess calories with serious comorbidity and body mass index (BMI) of 45.0 to 49.9 in adult (HCC) 12/10/2018    Depression     Disease of thyroid gland     Edema of both legs 2012    Hypertension     Iron deficiency anemia secondary to inadequate dietary iron intake 1/2/2019    Macrocytosis 2012    Vitamin D deficiency      Past Surgical History:   Procedure Laterality Date    BREAST BIOPSY Bilateral 05/30/2024    THYROID SURGERY      ablation    TUBAL LIGATION Bilateral 2009    US GUIDANCE BREAST BIOPSY LEFT EACH ADDITIONAL Left 5/30/2024    US GUIDED BREAST BIOPSY RIGHT COMPLETE Right 5/30/2024     Family History   Problem Relation Age of Onset    Breast cancer Mother 37    No Known Problems Father     No Known Problems Sister     No Known Problems Daughter     Breast cancer Maternal  Grandmother 45    No Known Problems Maternal Grandfather     No Known Problems Paternal Grandmother     No Known Problems Paternal Grandfather     Breast cancer Maternal Aunt 37    Breast cancer Maternal Aunt 57    No Known Problems Maternal Aunt     No Known Problems Maternal Aunt     No Known Problems Maternal Aunt     Breast cancer Cousin     Breast cancer Family     No Known Problems Paternal Aunt      Social History     Socioeconomic History    Marital status: Single     Spouse name: None    Number of children: None    Years of education: None    Highest education level: None   Occupational History    None   Tobacco Use    Smoking status: Never    Smokeless tobacco: Never   Vaping Use    Vaping status: Never Used   Substance and Sexual Activity    Alcohol use: Yes    Drug use: No    Sexual activity: Yes     Partners: Male     Birth control/protection: Female Sterilization   Other Topics Concern    None   Social History Narrative    None     Social Determinants of Health     Financial Resource Strain: Not on file   Food Insecurity: Not on file   Transportation Needs: Not on file   Physical Activity: Not on file   Stress: Not on file   Social Connections: Not on file   Intimate Partner Violence: Not on file   Housing Stability: Not on file       Current Outpatient Medications:     ascorbic acid (VITAMIN C) 500 MG tablet, Take 500 mg by mouth daily, Disp: , Rfl:     ergocalciferol (VITAMIN D2) 50,000 units, Take 50,000 Units by mouth once a week, Disp: , Rfl:     ferrous sulfate 325 (65 Fe) mg tablet, Take 1 tablet (325 mg total) by mouth daily with breakfast, Disp: 90 tablet, Rfl: 3    folic acid (FOLVITE) 1 mg tablet, Take 1 tablet by mouth once daily, Disp: 30 tablet, Rfl: 0    hydroCHLOROthiazide 25 mg tablet, Take 1 tablet (25 mg total) by mouth daily, Disp: 90 tablet, Rfl: 1    levothyroxine 150 mcg tablet, TAKE 1 TABLET (150 MCG TOTAL) BY MOUTH DAILY. ONLY ON SUNDAY MORNING, Disp: , Rfl:     levothyroxine  "175 mcg tablet, Take 1 tablet (175 mcg total) by mouth daily, Disp: 90 tablet, Rfl: 1    potassium chloride (Klor-Con M20) 20 mEq tablet, Take 1 tablet (20 mEq total) by mouth daily, Disp: 90 tablet, Rfl: 1    vitamin B-12 (VITAMIN B-12) 1,000 mcg tablet, Take 1 tablet (1,000 mcg total) by mouth daily, Disp: 90 tablet, Rfl: 1    Zepbound 5 MG/0.5ML auto-injector, INJECT 5 MG UNDER THE SKIN EVERY 7 DAYS., Disp: , Rfl:   No Known Allergies    Objective   /72 (BP Location: Right arm, Patient Position: Sitting, Cuff Size: Adult)   Pulse 71   Temp 97.8 °F (36.6 °C)   Resp 18   Ht 5' 6\" (1.676 m)   Wt 124 kg (274 lb)   SpO2 100%   BMI 44.22 kg/m²    Physical Exam  Constitutional:       General: She is not in acute distress.     Appearance: She is well-developed. She is obese.   HENT:      Head: Normocephalic and atraumatic.   Eyes:      General: No scleral icterus.     Conjunctiva/sclera: Conjunctivae normal.   Cardiovascular:      Rate and Rhythm: Normal rate.   Pulmonary:      Effort: Pulmonary effort is normal. No respiratory distress.   Skin:     General: Skin is warm.      Coloration: Skin is not pale.      Findings: No rash.   Neurological:      Mental Status: She is alert and oriented to person, place, and time.   Psychiatric:         Thought Content: Thought content normal.         Result Review  Labs:  No visits with results within 3 Week(s) from this visit.   Latest known visit with results is:   Appointment on 07/22/2024   Component Date Value Ref Range Status    TSH 3RD GENERATON 07/22/2024 2.550  0.450 - 4.500 uIU/mL Final    The recommended reference ranges for TSH during pregnancy are as follows:   First trimester 0.100 to 2.500 uIU/mL   Second trimester  0.200 to 3.000 uIU/mL   Third trimester 0.300 to 3.000 uIU/m    Note: Normal ranges may not apply to patients who are transgender, non-binary, or whose legal sex, sex at birth, and gender identity differ.  Adult TSH (3rd generation) reference " range follows the recommended guidelines of the American Thyroid Association, January, 2020.    Free T4 07/22/2024 0.75  0.61 - 1.12 ng/dL Final    Specimens with biotin concentrations > 10 ng/mL can lead to significant (> 10%) positive bias in result.       Please note:  This report has been generated by a voice recognition software system. Therefore there may be syntax, spelling, and/or grammatical errors. Please call if you have any questions.

## 2024-09-06 NOTE — PATIENT INSTRUCTIONS
[facility] Medical Oncology and Hematology Team  [place] - [phone]    Your Team Member:  Advanced Practitioner:  Geetha Wise PA-C    Please answer Private and Unavailable Calls - this may be your team(s) contacting you.  If you have medical questions/concerns/issues - contact us either by (1) My Chart (2) [place]       REMEMBER TO TAKE B 12 GUMMIES 1000 MCG 2 TABLETS DAILY!

## 2024-09-13 ENCOUNTER — HOSPITAL ENCOUNTER (OUTPATIENT)
Dept: INFUSION CENTER | Facility: HOSPITAL | Age: 44
End: 2024-09-13
Attending: INTERNAL MEDICINE
Payer: COMMERCIAL

## 2024-09-13 DIAGNOSIS — E53.8 B12 DEFICIENCY: Primary | ICD-10-CM

## 2024-09-13 PROCEDURE — 96372 THER/PROPH/DIAG INJ SC/IM: CPT

## 2024-09-13 RX ORDER — CYANOCOBALAMIN 1000 UG/ML
1000 INJECTION, SOLUTION INTRAMUSCULAR; SUBCUTANEOUS ONCE
Status: CANCELLED | OUTPATIENT
Start: 2024-09-20

## 2024-09-13 RX ORDER — CYANOCOBALAMIN 1000 UG/ML
1000 INJECTION, SOLUTION INTRAMUSCULAR; SUBCUTANEOUS ONCE
Status: COMPLETED | OUTPATIENT
Start: 2024-09-13 | End: 2024-09-13

## 2024-09-13 RX ADMIN — CYANOCOBALAMIN 1000 MCG: 1000 INJECTION, SOLUTION INTRAMUSCULAR at 08:31

## 2024-09-13 NOTE — PROGRESS NOTES
Patient tolerated B12 injection without incident. Pt is aware of all future appointments via MyChart. Declined AVS.

## 2024-09-20 ENCOUNTER — HOSPITAL ENCOUNTER (OUTPATIENT)
Dept: INFUSION CENTER | Facility: HOSPITAL | Age: 44
End: 2024-09-20
Attending: INTERNAL MEDICINE
Payer: COMMERCIAL

## 2024-09-20 DIAGNOSIS — E53.8 B12 DEFICIENCY: Primary | ICD-10-CM

## 2024-09-20 PROCEDURE — 96372 THER/PROPH/DIAG INJ SC/IM: CPT

## 2024-09-20 RX ORDER — CYANOCOBALAMIN 1000 UG/ML
1000 INJECTION, SOLUTION INTRAMUSCULAR; SUBCUTANEOUS ONCE
Status: COMPLETED | OUTPATIENT
Start: 2024-09-20 | End: 2024-09-20

## 2024-09-20 RX ORDER — CYANOCOBALAMIN 1000 UG/ML
1000 INJECTION, SOLUTION INTRAMUSCULAR; SUBCUTANEOUS ONCE
Status: CANCELLED | OUTPATIENT
Start: 2024-09-27

## 2024-09-20 RX ADMIN — CYANOCOBALAMIN 1000 MCG: 1000 INJECTION, SOLUTION INTRAMUSCULAR at 09:27

## 2024-09-20 NOTE — PROGRESS NOTES
Maritza Butt  tolerated B12 well with no complications. Aware of future appt on 9/27/24 at 930. AVS declined. Patient left clinic ambulatory.

## 2024-09-27 ENCOUNTER — HOSPITAL ENCOUNTER (OUTPATIENT)
Dept: INFUSION CENTER | Facility: HOSPITAL | Age: 44
End: 2024-09-27
Attending: INTERNAL MEDICINE
Payer: COMMERCIAL

## 2024-09-27 DIAGNOSIS — E53.8 B12 DEFICIENCY: Primary | ICD-10-CM

## 2024-09-27 PROCEDURE — 96372 THER/PROPH/DIAG INJ SC/IM: CPT

## 2024-09-27 RX ORDER — CYANOCOBALAMIN 1000 UG/ML
1000 INJECTION, SOLUTION INTRAMUSCULAR; SUBCUTANEOUS ONCE
Status: COMPLETED | OUTPATIENT
Start: 2024-09-27 | End: 2024-09-27

## 2024-09-27 RX ORDER — CYANOCOBALAMIN 1000 UG/ML
1000 INJECTION, SOLUTION INTRAMUSCULAR; SUBCUTANEOUS ONCE
OUTPATIENT
Start: 2024-10-04

## 2024-09-27 RX ADMIN — CYANOCOBALAMIN 1000 MCG: 1000 INJECTION, SOLUTION INTRAMUSCULAR at 09:28

## 2024-09-27 NOTE — PROGRESS NOTES
Pt tolerated B12 injection to R deltoid without difficulty.  Confirmed next appt on 10/4 at 0930.  AVS declined.  Left ambulatory in stable condition.

## 2024-10-04 ENCOUNTER — HOSPITAL ENCOUNTER (OUTPATIENT)
Dept: INFUSION CENTER | Facility: HOSPITAL | Age: 44
End: 2024-10-04
Attending: INTERNAL MEDICINE
Payer: COMMERCIAL

## 2024-10-04 DIAGNOSIS — E53.8 B12 DEFICIENCY: Primary | ICD-10-CM

## 2024-10-04 PROCEDURE — 96372 THER/PROPH/DIAG INJ SC/IM: CPT

## 2024-10-04 RX ORDER — CYANOCOBALAMIN 1000 UG/ML
1000 INJECTION, SOLUTION INTRAMUSCULAR; SUBCUTANEOUS ONCE
Status: COMPLETED | OUTPATIENT
Start: 2024-10-04 | End: 2024-10-04

## 2024-10-04 RX ORDER — CYANOCOBALAMIN 1000 UG/ML
1000 INJECTION, SOLUTION INTRAMUSCULAR; SUBCUTANEOUS ONCE
Status: CANCELLED | OUTPATIENT
Start: 2024-10-11

## 2024-10-04 RX ADMIN — CYANOCOBALAMIN 1000 MCG: 1000 INJECTION, SOLUTION INTRAMUSCULAR at 09:04

## 2024-10-04 NOTE — PROGRESS NOTES
B12 IM inj given in LEFT deltoid. Tolerated well, offers no complaints. Confirmed next appt, declined AVS

## 2024-10-11 ENCOUNTER — HOSPITAL ENCOUNTER (OUTPATIENT)
Dept: INFUSION CENTER | Facility: HOSPITAL | Age: 44
End: 2024-10-11
Attending: INTERNAL MEDICINE
Payer: COMMERCIAL

## 2024-10-11 DIAGNOSIS — E53.8 B12 DEFICIENCY: Primary | ICD-10-CM

## 2024-10-11 PROCEDURE — 96372 THER/PROPH/DIAG INJ SC/IM: CPT

## 2024-10-11 RX ORDER — CYANOCOBALAMIN 1000 UG/ML
1000 INJECTION, SOLUTION INTRAMUSCULAR; SUBCUTANEOUS ONCE
Status: COMPLETED | OUTPATIENT
Start: 2024-10-11 | End: 2024-10-11

## 2024-10-11 RX ORDER — CYANOCOBALAMIN 1000 UG/ML
1000 INJECTION, SOLUTION INTRAMUSCULAR; SUBCUTANEOUS ONCE
Status: CANCELLED | OUTPATIENT
Start: 2024-10-18

## 2024-10-11 RX ADMIN — CYANOCOBALAMIN 1000 MCG: 1000 INJECTION INTRAMUSCULAR; SUBCUTANEOUS at 09:18

## 2024-10-11 NOTE — PROGRESS NOTES
Pt tolerated B12 injection to R deltoid without difficulty.  Confirmed next appt on 10/18 at 0930.  AVS declined.  Left ambulatory in stable condition.

## 2024-10-16 ENCOUNTER — APPOINTMENT (OUTPATIENT)
Dept: LAB | Facility: HOSPITAL | Age: 44
End: 2024-10-16
Payer: COMMERCIAL

## 2024-10-16 DIAGNOSIS — Z83.3 FAMILY HISTORY OF DIABETES MELLITUS: ICD-10-CM

## 2024-10-16 DIAGNOSIS — D50.0 IRON DEFICIENCY ANEMIA SECONDARY TO BLOOD LOSS (CHRONIC): Primary | ICD-10-CM

## 2024-10-16 DIAGNOSIS — E89.0 POSTSURGICAL HYPOTHYROIDISM: ICD-10-CM

## 2024-10-16 DIAGNOSIS — E55.9 AVITAMINOSIS D: ICD-10-CM

## 2024-10-16 LAB
25(OH)D3 SERPL-MCNC: 27.5 NG/ML (ref 30–100)
ALBUMIN SERPL BCG-MCNC: 4 G/DL (ref 3.5–5)
ALP SERPL-CCNC: 73 U/L (ref 34–104)
ALT SERPL W P-5'-P-CCNC: 7 U/L (ref 7–52)
ANION GAP SERPL CALCULATED.3IONS-SCNC: 7 MMOL/L (ref 4–13)
AST SERPL W P-5'-P-CCNC: 11 U/L (ref 5–45)
BASOPHILS # BLD AUTO: 0.02 THOUSANDS/ΜL (ref 0–0.1)
BASOPHILS NFR BLD AUTO: 1 % (ref 0–1)
BILIRUB SERPL-MCNC: 0.96 MG/DL (ref 0.2–1)
BUN SERPL-MCNC: 8 MG/DL (ref 5–25)
CALCIUM SERPL-MCNC: 9 MG/DL (ref 8.4–10.2)
CHLORIDE SERPL-SCNC: 104 MMOL/L (ref 96–108)
CHOLEST SERPL-MCNC: 146 MG/DL
CO2 SERPL-SCNC: 27 MMOL/L (ref 21–32)
CREAT SERPL-MCNC: 0.7 MG/DL (ref 0.6–1.3)
EOSINOPHIL # BLD AUTO: 0.17 THOUSAND/ΜL (ref 0–0.61)
EOSINOPHIL NFR BLD AUTO: 4 % (ref 0–6)
ERYTHROCYTE [DISTWIDTH] IN BLOOD BY AUTOMATED COUNT: 12.9 % (ref 11.6–15.1)
EST. AVERAGE GLUCOSE BLD GHB EST-MCNC: 97 MG/DL
GLUCOSE P FAST SERPL-MCNC: 77 MG/DL (ref 65–99)
HBA1C MFR BLD: 5 %
HCT VFR BLD AUTO: 41.1 % (ref 36.5–46.1)
HDLC SERPL-MCNC: 38 MG/DL
HGB BLD-MCNC: 13.8 G/DL (ref 12–15.4)
IMM GRANULOCYTES # BLD AUTO: 0.01 THOUSAND/UL (ref 0–0.2)
IMM GRANULOCYTES NFR BLD AUTO: 0 % (ref 0–2)
IRON SERPL-MCNC: 77 UG/DL (ref 50–212)
LDLC SERPL CALC-MCNC: 78 MG/DL (ref 0–100)
LYMPHOCYTES # BLD AUTO: 0.86 THOUSANDS/ΜL (ref 0.6–4.47)
LYMPHOCYTES NFR BLD AUTO: 20 % (ref 14–44)
MCH RBC QN AUTO: 29.6 PG (ref 26.8–34.3)
MCHC RBC AUTO-ENTMCNC: 33.6 G/DL (ref 31.4–37.4)
MCV RBC AUTO: 88 FL (ref 82–98)
MONOCYTES # BLD AUTO: 0.42 THOUSAND/ΜL (ref 0.17–1.22)
MONOCYTES NFR BLD AUTO: 10 % (ref 4–12)
NEUTROPHILS # BLD AUTO: 2.85 THOUSANDS/ΜL (ref 1.85–7.62)
NEUTS SEG NFR BLD AUTO: 65 % (ref 43–75)
NONHDLC SERPL-MCNC: 108 MG/DL
NRBC BLD AUTO-RTO: 0 /100 WBCS
PLATELET # BLD AUTO: 226 THOUSANDS/UL (ref 149–390)
PMV BLD AUTO: 10.8 FL (ref 8.9–12.7)
POTASSIUM SERPL-SCNC: 4 MMOL/L (ref 3.5–5.3)
PROT SERPL-MCNC: 7 G/DL (ref 6.4–8.4)
RBC # BLD AUTO: 4.66 MILLION/UL (ref 3.88–5.12)
SODIUM SERPL-SCNC: 138 MMOL/L (ref 135–147)
TRIGL SERPL-MCNC: 148 MG/DL
WBC # BLD AUTO: 4.33 THOUSAND/UL (ref 4.31–10.16)

## 2024-10-16 PROCEDURE — 82306 VITAMIN D 25 HYDROXY: CPT

## 2024-10-16 PROCEDURE — 80053 COMPREHEN METABOLIC PANEL: CPT

## 2024-10-16 PROCEDURE — 83036 HEMOGLOBIN GLYCOSYLATED A1C: CPT

## 2024-10-16 PROCEDURE — 85025 COMPLETE CBC W/AUTO DIFF WBC: CPT

## 2024-10-16 PROCEDURE — 80061 LIPID PANEL: CPT

## 2024-10-16 PROCEDURE — 36415 COLL VENOUS BLD VENIPUNCTURE: CPT

## 2024-10-16 PROCEDURE — 83540 ASSAY OF IRON: CPT

## 2024-10-18 ENCOUNTER — HOSPITAL ENCOUNTER (OUTPATIENT)
Dept: INFUSION CENTER | Facility: HOSPITAL | Age: 44
End: 2024-10-18
Attending: INTERNAL MEDICINE
Payer: COMMERCIAL

## 2024-10-18 DIAGNOSIS — E53.8 B12 DEFICIENCY: Primary | ICD-10-CM

## 2024-10-18 PROCEDURE — 96372 THER/PROPH/DIAG INJ SC/IM: CPT

## 2024-10-18 RX ORDER — CYANOCOBALAMIN 1000 UG/ML
1000 INJECTION, SOLUTION INTRAMUSCULAR; SUBCUTANEOUS ONCE
Status: CANCELLED | OUTPATIENT
Start: 2024-10-18

## 2024-10-18 RX ORDER — CYANOCOBALAMIN 1000 UG/ML
1000 INJECTION, SOLUTION INTRAMUSCULAR; SUBCUTANEOUS ONCE
Status: COMPLETED | OUTPATIENT
Start: 2024-10-18 | End: 2024-10-18

## 2024-10-18 RX ADMIN — CYANOCOBALAMIN 1000 MCG: 1000 INJECTION INTRAMUSCULAR; SUBCUTANEOUS at 09:02

## 2024-10-18 NOTE — PROGRESS NOTES
Maritza Butt  tolerated treatment well with no complications.  B12 admin in L deltoid     Maritza Butt is aware no further appts at this time    AVS printed and given to Maritza Butt:  No (Declined by Maritza Butt)

## 2024-11-19 ENCOUNTER — HOSPITAL ENCOUNTER (OUTPATIENT)
Dept: MAMMOGRAPHY | Facility: CLINIC | Age: 44
Discharge: HOME | End: 2024-11-19
Payer: COMMERCIAL

## 2024-11-19 VITALS — WEIGHT: 274 LBS | HEIGHT: 66 IN | BODY MASS INDEX: 44.03 KG/M2

## 2024-11-19 DIAGNOSIS — R92.8 ABNORMAL MAMMOGRAM: ICD-10-CM

## 2024-11-19 PROCEDURE — 76642 ULTRASOUND BREAST LIMITED: CPT

## 2024-11-19 PROCEDURE — G0279 TOMOSYNTHESIS, MAMMO: HCPCS

## 2024-11-19 PROCEDURE — 77065 DX MAMMO INCL CAD UNI: CPT

## 2024-12-06 ENCOUNTER — APPOINTMENT (OUTPATIENT)
Dept: LAB | Facility: HOSPITAL | Age: 44
End: 2024-12-06
Payer: COMMERCIAL

## 2024-12-06 DIAGNOSIS — E53.8 B12 DEFICIENCY: ICD-10-CM

## 2024-12-06 LAB
BASOPHILS # BLD AUTO: 0.02 THOUSANDS/ÂΜL (ref 0–0.1)
BASOPHILS NFR BLD AUTO: 0 % (ref 0–1)
EOSINOPHIL # BLD AUTO: 0.13 THOUSAND/ÂΜL (ref 0–0.61)
EOSINOPHIL NFR BLD AUTO: 2 % (ref 0–6)
ERYTHROCYTE [DISTWIDTH] IN BLOOD BY AUTOMATED COUNT: 12.7 % (ref 11.6–15.1)
FERRITIN SERPL-MCNC: 12 NG/ML (ref 24–307)
FOLATE SERPL-MCNC: 17.6 NG/ML
HCT VFR BLD AUTO: 40 % (ref 36.5–46.1)
HGB BLD-MCNC: 13.1 G/DL (ref 12–15.4)
IMM GRANULOCYTES # BLD AUTO: 0.01 THOUSAND/UL (ref 0–0.2)
IMM GRANULOCYTES NFR BLD AUTO: 0 % (ref 0–2)
IRON SATN MFR SERPL: 25 % (ref 15–50)
IRON SERPL-MCNC: 85 UG/DL (ref 50–212)
LYMPHOCYTES # BLD AUTO: 1.09 THOUSANDS/ÂΜL (ref 0.6–4.47)
LYMPHOCYTES NFR BLD AUTO: 21 % (ref 14–44)
MCH RBC QN AUTO: 29.3 PG (ref 26.8–34.3)
MCHC RBC AUTO-ENTMCNC: 32.8 G/DL (ref 31.4–37.4)
MCV RBC AUTO: 90 FL (ref 82–98)
MONOCYTES # BLD AUTO: 0.37 THOUSAND/ÂΜL (ref 0.17–1.22)
MONOCYTES NFR BLD AUTO: 7 % (ref 4–12)
NEUTROPHILS # BLD AUTO: 3.71 THOUSANDS/ÂΜL (ref 1.85–7.62)
NEUTS SEG NFR BLD AUTO: 70 % (ref 43–75)
NRBC BLD AUTO-RTO: 0 /100 WBCS
PLATELET # BLD AUTO: 231 THOUSANDS/UL (ref 149–390)
PMV BLD AUTO: 10.2 FL (ref 8.9–12.7)
RBC # BLD AUTO: 4.47 MILLION/UL (ref 3.88–5.12)
TIBC SERPL-MCNC: 342 UG/DL (ref 250–450)
UIBC SERPL-MCNC: 257 UG/DL (ref 155–355)
VIT B12 SERPL-MCNC: 149 PG/ML (ref 180–914)
WBC # BLD AUTO: 5.33 THOUSAND/UL (ref 4.31–10.16)

## 2024-12-06 PROCEDURE — 82607 VITAMIN B-12: CPT

## 2024-12-06 PROCEDURE — 82746 ASSAY OF FOLIC ACID SERUM: CPT

## 2024-12-06 PROCEDURE — 85025 COMPLETE CBC W/AUTO DIFF WBC: CPT

## 2024-12-06 PROCEDURE — 36415 COLL VENOUS BLD VENIPUNCTURE: CPT

## 2024-12-06 PROCEDURE — 83540 ASSAY OF IRON: CPT

## 2024-12-06 PROCEDURE — 82728 ASSAY OF FERRITIN: CPT

## 2024-12-06 PROCEDURE — 83550 IRON BINDING TEST: CPT

## 2024-12-09 ENCOUNTER — OFFICE VISIT (OUTPATIENT)
Dept: HEMATOLOGY ONCOLOGY | Facility: CLINIC | Age: 44
End: 2024-12-09
Payer: COMMERCIAL

## 2024-12-09 VITALS
DIASTOLIC BLOOD PRESSURE: 78 MMHG | OXYGEN SATURATION: 97 % | HEART RATE: 72 BPM | SYSTOLIC BLOOD PRESSURE: 126 MMHG | BODY MASS INDEX: 43.07 KG/M2 | RESPIRATION RATE: 18 BRPM | WEIGHT: 268 LBS | TEMPERATURE: 97.8 F | HEIGHT: 66 IN

## 2024-12-09 DIAGNOSIS — E53.8 B12 DEFICIENCY: ICD-10-CM

## 2024-12-09 DIAGNOSIS — N92.0 MENORRHAGIA WITH REGULAR CYCLE: ICD-10-CM

## 2024-12-09 DIAGNOSIS — E61.1 HYPOFERREMIA: Primary | ICD-10-CM

## 2024-12-09 PROCEDURE — 99215 OFFICE O/P EST HI 40 MIN: CPT | Performed by: PHYSICIAN ASSISTANT

## 2024-12-09 RX ORDER — CYANOCOBALAMIN 1000 UG/ML
1000 INJECTION, SOLUTION INTRAMUSCULAR; SUBCUTANEOUS ONCE
Start: 2024-12-23 | End: 2024-12-23

## 2024-12-09 RX ORDER — SODIUM CHLORIDE 9 MG/ML
20 INJECTION, SOLUTION INTRAVENOUS ONCE
OUTPATIENT
Start: 2024-12-23

## 2024-12-09 NOTE — PROGRESS NOTES
[address]  [facility]AT[facility] [place]  [address]  [facility] [id number]  Hematology Ambulatory Follow-Up  Maritza Butt, 1980, [id number]  12/9/2024      Assessment and Plan   1. Hypoferremia (Primary); 2. B12 deficiency  This is a 44-year-old female who has failed oral iron supplementation with continuing diminishing ferritin levels with heavy menstruation see #3 below.  Patient has received B12 injections in the past.  Compliance is somewhat of an issue however, the patient also appears to be dietary deficient or has a problem with absorption.    IV iron was recommended:  Venofer  200 mg IV weekly x 7.  We discussed potential side effects which include but are not limited to IV site reactions, tattooing, hypotension, arthralgias, headache, nausea, and anaphylaxis.  If patient experiences any side effects they are to call the office to discuss premedications are necessary for subsequent dosing.      Regimen:  Venofer 200 mg IV weekly x 7  +  B12 1000 mcg IM weekly x 7  Followed by  B12 3000 mcg gummy daily + iron 65 mg elemental daily.    - CBC and differential; Future  - Iron Panel (Includes Ferritin, Iron Sat%, Iron, and TIBC); Future  - Vitamin B12; Future  - Folate; Future    3. Menorrhagia with regular cycle  Patient's heavy menses for 2 days in the beginning of her period is substantial enough to cause the patient to be continually iron deficient.  Unfortunately, blood loss is greater than that of absorption.  Patient has been fortunate that she has not become anemic back to this point however, it is something that would occur if we do not treat with IV iron now.  Patient has been sent to gynecologist to evaluate and treat heavy menstruation.    The patient is scheduled for follow-up in approximately 4 months.     Patient voiced agreement and understanding to the above.   Patient advised to call the Hematology/Oncology office with any questions and  concerns regarding the above.    Barrier(s) to care: None  The patient is  able to self care.    Geetha Wise PA-C  Medical Oncology/Hematology  [facility]    Subjective     Chief Complaint   Patient presents with    Follow-up       History of present illness:   This is a 44-year-old female with past medical history of obesity, depression, hypertension, postsurgical hypothyroidism following with endocrinology who presents to the hematology clinic for evaluation of iron deficiency anemia.     4/4/2017     WBC 7.28, hemoglobin 13.1, MCV 83, RDW 16.7, platelet 245  12/29/2018 WBC 6.0, hemoglobin 11.3, MCV 79, RDW 16.6, platelets 267  5/1/2020     WBC 4.6, hemoglobin 12.0, MCV 78, RDW 18.2, platelet 256  5/7/2021     WBC 4.4, hemoglobin 11.6, MCV 87 D8, RDW 17.2, platelet 283  3/7/22         WBC 6.1, hemoglobin 10.6, MCV 74, RDW 17.4, platelet 296  9/1/2023     WBC 5.4, hemoglobin 9.2, MCV 72, RDW 19.6, platelet 344  10/11/2023                          Ferritin = 9; started on oral iron supplements                          Iron saturation 10%, TIBC 369  11/6/2023   WBC 4.9, hemoglobin 11.8, MCV 80, RDW 20.0, platelet 298  Ferritin 21; recommended increasing oral supplements to twice a day with vitamin C  Iron saturation 12 TIBC 336     Menstrual history: Patient has noted crease in cycle length to approximately 21 days.  Occasionally patient will have her period twice a month.  Patient bleeds heavily for 3 days changing a pad or tampon every 2-3 hours.  Patient is not on birth control.  Patient had tried it 25 years ago and has not gone back to it.  No tobacco use.     01/04/24: Mild stomach upset associated with nausea with oral iron.  Patient notes intermittent compliance.      Notes intermittent headaches some worse than others.  Fatigue.  No easy bruising.  Sleeping well.     No family history of colon cancer.  Patient has never had a colonoscopy.  Patient is up-to-date on Pap smear  last completed in 9/23. Neg for high risk HPV     January appointment recommended IV iron supplementation however, this was never completed due to communication issue.  Patient continued on oral supplementation.     1/4/2024: WBC 5.1, hemoglobin 12.0, MCV 83, platelet count 252, ferritin 11              Iron saturation 12%, TIBC 381              B12 78, folate 10.9              Recommended B12 tablets plus folic acid 1 mg.     3/6/2024:  BRCA1/2 Analyses with [employer]® +[employer]® completed on 3/6/2024.                          Negative results.     4/20/2024 WBC 4.3, hemoglobin 13.5, MCV 85, platelet count 233, ferritin 22              Folate = 22, B12 234     5/4/2024: Fecal occult blood testing negative, urinalysis negative for blood.     9/5/2024 WBC 5.17, hemoglobin 13.5, hematocrit 40.9, platelet count 212              Iron saturation 31%, ferritin 27.              B12 76, folate 14    12/6/2024 WBC 5.3, hemoglobin 13.1, MCV 90, platelet count 231   Ferritin 12, TIBC 342   Vitamin B12 149, folate 17.6    Interval history: This is a 44-year-old female with history of the above.  Patient has not been terribly compliant with taking B12 has been a bit better at taking the iron.  Unfortunately, patient's iron levels continue to drop, while hemoglobin remained stable, patient is significantly hyponatremic and patient's B12 level still remains significantly subtherapeutic.    Review of Systems   Constitutional:  Negative for appetite change, fatigue, fever and unexpected weight change.   HENT:  Negative for nosebleeds.    Respiratory:  Negative for cough, choking and shortness of breath.         Negative hemoptysis.   Cardiovascular:  Negative for chest pain, palpitations and leg swelling.   Gastrointestinal: Negative.  Negative for abdominal distention, abdominal pain, anal bleeding, blood in stool, constipation, diarrhea, nausea and vomiting.   Endocrine: Negative.  Negative for cold intolerance.    Genitourinary: Negative.  Negative for hematuria, menstrual problem, vaginal bleeding, vaginal discharge and vaginal pain.   Musculoskeletal: Negative.  Negative for arthralgias, myalgias, neck pain and neck stiffness.   Skin: Negative.  Negative for color change, pallor and rash.   Allergic/Immunologic: Negative.  Negative for immunocompromised state.   Neurological: Negative.  Negative for weakness and headaches.   Hematological:  Negative for adenopathy. Does not bruise/bleed easily.   All other systems reviewed and are negative.      Patient Active Problem List   Diagnosis    Essential hypertension    Vitamin D deficiency    Iron deficiency anemia secondary to inadequate dietary iron intake    Hypokalemia    Dysfunctional uterine bleeding    Varicose veins of both legs with edema    Obesity, Class III, BMI 40-49.9 (morbid obesity) ([facility])    Hypothyroidism following radioiodine therapy    Obstructive sleep apnea treated with continuous positive airway pressure (CPAP)    Left leg paresthesias    Primary hypertriglyceridemia    Encounter for screening mammogram for malignant neoplasm of breast    Acute right ankle pain    Family history of breast cancer gene mutation in first degree relative    B12 deficiency    History of Graves' disease    Family history of diabetes mellitus type II    Neck mass    Abnormal ultrasound of neck    Hypoferremia     Past Medical History:   Diagnosis Date    Class 3 severe obesity due to excess calories with serious comorbidity and body mass index (BMI) of 45.0 to 49.9 in adult ([facility]) 12/10/2018    Depression     Disease of thyroid gland     Edema of both legs 2012    Hypertension     Iron deficiency anemia secondary to inadequate dietary iron intake 1/2/2019    Macrocytosis 2012    Vitamin D deficiency      Past Surgical History:   Procedure Laterality Date    BREAST BIOPSY Bilateral 05/30/2024    THYROID SURGERY      ablation    TUBAL LIGATION Bilateral 2009    US GUIDANCE BREAST  BIOPSY LEFT EACH ADDITIONAL Left 5/30/2024    US GUIDED BREAST BIOPSY RIGHT COMPLETE Right 5/30/2024     Family History   Problem Relation Age of Onset    Breast cancer Mother 37    No Known Problems Father     No Known Problems Sister     No Known Problems Daughter     Breast cancer Maternal Grandmother 45    No Known Problems Maternal Grandfather     No Known Problems Paternal Grandmother     No Known Problems Paternal Grandfather     Breast cancer Maternal Aunt 37    Breast cancer Maternal Aunt 57    No Known Problems Maternal Aunt     No Known Problems Maternal Aunt     No Known Problems Maternal Aunt     Breast cancer Cousin     Breast cancer Family     No Known Problems Paternal Aunt      Social History     Socioeconomic History    Marital status: Single     Spouse name: None    Number of children: None    Years of education: None    Highest education level: None   Occupational History    None   Tobacco Use    Smoking status: Never    Smokeless tobacco: Never   Vaping Use    Vaping status: Never Used   Substance and Sexual Activity    Alcohol use: Yes    Drug use: No    Sexual activity: Yes     Partners: Male     Birth control/protection: Female Sterilization   Other Topics Concern    None   Social History Narrative    None     Social Drivers of Health     Financial Resource Strain: Not on file   Food Insecurity: Not on file   Transportation Needs: Not on file   Physical Activity: Not on file   Stress: Not on file   Social Connections: Not on file   Intimate Partner Violence: Not on file   Housing Stability: Not on file       Current Outpatient Medications:     ergocalciferol (VITAMIN D2) 50,000 units, Take 50,000 Units by mouth once a week, Disp: , Rfl:     ferrous sulfate 325 (65 Fe) mg tablet, Take 1 tablet (325 mg total) by mouth daily with breakfast, Disp: 90 tablet, Rfl: 3    folic acid (FOLVITE) 1 mg tablet, Take 1 tablet by mouth once daily, Disp: 30 tablet, Rfl: 0    hydroCHLOROthiazide 25 mg tablet,  "Take 1 tablet (25 mg total) by mouth daily, Disp: 90 tablet, Rfl: 1    levothyroxine 150 mcg tablet, TAKE 1 TABLET (150 MCG TOTAL) BY MOUTH DAILY. ONLY ON SUNDAY MORNING, Disp: , Rfl:     levothyroxine 175 mcg tablet, Take 1 tablet (175 mcg total) by mouth daily, Disp: 90 tablet, Rfl: 1    potassium chloride (Klor-Con M20) 20 mEq tablet, Take 1 tablet (20 mEq total) by mouth daily, Disp: 90 tablet, Rfl: 1    vitamin B-12 (VITAMIN B-12) 1,000 mcg tablet, Take 1 tablet (1,000 mcg total) by mouth daily, Disp: 90 tablet, Rfl: 1    Zepbound 5 MG/0.5ML auto-injector, INJECT 5 MG UNDER THE SKIN EVERY 7 DAYS., Disp: , Rfl:   No Known Allergies    Objective   /78 (BP Location: Right arm, Patient Position: Sitting, Cuff Size: Adult)   Pulse 72   Temp 97.8 °F (36.6 °C)   Resp 18   Ht 5' 6\" (1.676 m)   Wt 122 kg (268 lb)   LMP 11/04/2024 (Exact Date)   SpO2 97%   BMI 43.26 kg/m²    Physical Exam  Constitutional:       General: She is not in acute distress.     Appearance: She is well-developed.   HENT:      Head: Normocephalic and atraumatic.   Eyes:      General: No scleral icterus.     Conjunctiva/sclera: Conjunctivae normal.   Cardiovascular:      Rate and Rhythm: Normal rate.   Pulmonary:      Effort: Pulmonary effort is normal. No respiratory distress.   Skin:     General: Skin is warm.      Coloration: Skin is not pale.      Findings: No rash.   Neurological:      Mental Status: She is alert and oriented to person, place, and time.   Psychiatric:         Thought Content: Thought content normal.         Result Review  Labs:  Appointment on 12/06/2024   Component Date Value Ref Range Status    WBC 12/06/2024 5.33  4.31 - 10.16 Thousand/uL Final    RBC 12/06/2024 4.47  3.88 - 5.12 Million/uL Final    Hemoglobin 12/06/2024 13.1  12.0 - 15.4 g/dL Final    Hematocrit 12/06/2024 40.0  36.5 - 46.1 % Final    MCV 12/06/2024 90  82 - 98 fL Final    MCH 12/06/2024 29.3  26.8 - 34.3 pg Final    [facility] 12/06/2024 32.8  " 31.4 - 37.4 g/dL Final    RDW 12/06/2024 12.7  11.6 - 15.1 % Final    MPV 12/06/2024 10.2  8.9 - 12.7 fL Final    Platelets 12/06/2024 231  149 - 390 Thousands/uL Final    nRBC 12/06/2024 0  /100 WBCs Final    Segmented % 12/06/2024 70  43 - 75 % Final    Immature Grans % 12/06/2024 0  0 - 2 % Final    Lymphocytes % 12/06/2024 21  14 - 44 % Final    Monocytes % 12/06/2024 7  4 - 12 % Final    Eosinophils Relative 12/06/2024 2  0 - 6 % Final    Basophils Relative 12/06/2024 0  0 - 1 % Final    Absolute Neutrophils 12/06/2024 3.71  1.85 - 7.62 Thousands/µL Final    Absolute Immature Grans 12/06/2024 0.01  0.00 - 0.20 Thousand/uL Final    Absolute Lymphocytes 12/06/2024 1.09  0.60 - 4.47 Thousands/µL Final    Absolute Monocytes 12/06/2024 0.37  0.17 - 1.22 Thousand/µL Final    Eosinophils Absolute 12/06/2024 0.13  0.00 - 0.61 Thousand/µL Final    Basophils Absolute 12/06/2024 0.02  0.00 - 0.10 Thousands/µL Final    Vitamin B-12 12/06/2024 149 (L)  180 - 914 pg/mL Final    Folate 12/06/2024 17.6  >5.9 ng/mL Final    The World Health Organization has determined deficient folate concentrations are considered to be <4.0 ng/mL.    Iron Saturation 12/06/2024 25  15 - 50 % Final    TIBC 12/06/2024 342  250 - 450 ug/dL Final    Iron 12/06/2024 85  50 - 212 ug/dL Final    Patients treated with metal-binding drugs (ie. Deferoxamine) may have depressed iron values.    UIBC 12/06/2024 257  155 - 355 ug/dL Final    Ferritin 12/06/2024 12 (L)  24 - 307 ng/mL Final       Please note:  This report has been generated by a voice recognition software system. Therefore there may be syntax, spelling, and/or grammatical errors. Please call if you have any questions.

## 2024-12-16 ENCOUNTER — RA CDI HCC (OUTPATIENT)
Dept: OTHER | Facility: HOSPITAL | Age: 44
End: 2024-12-16

## 2024-12-20 ENCOUNTER — OFFICE VISIT (OUTPATIENT)
Age: 44
End: 2024-12-20
Payer: COMMERCIAL

## 2024-12-20 VITALS
SYSTOLIC BLOOD PRESSURE: 120 MMHG | HEART RATE: 84 BPM | RESPIRATION RATE: 18 BRPM | WEIGHT: 262 LBS | OXYGEN SATURATION: 98 % | DIASTOLIC BLOOD PRESSURE: 78 MMHG | HEIGHT: 66 IN | TEMPERATURE: 97 F | BODY MASS INDEX: 42.11 KG/M2

## 2024-12-20 DIAGNOSIS — Z00.00 WELL ADULT EXAM: Primary | ICD-10-CM

## 2024-12-20 PROCEDURE — 99396 PREV VISIT EST AGE 40-64: CPT | Performed by: PHYSICIAN ASSISTANT

## 2024-12-20 RX ORDER — TIRZEPATIDE 15 MG/.5ML
INJECTION, SOLUTION SUBCUTANEOUS
COMMUNITY
Start: 2024-12-16

## 2024-12-20 NOTE — PROGRESS NOTES
Name: Maritza Butt      : 1980      MRN: [id number]  Encounter Provider: Christine Coello PA-C  Encounter Date: 2024   Encounter department: [facility]  :  Assessment & Plan  Well adult exam  -She has not seen the dentist in 2 years but she does have an upcoming cleaning in January  - She does wear glasses and is just see the eye doctor yearly  - She has been losing weight on Zepbound which has been prescribed by Dr. Molina/endocrinology  Dr. Molina discontinued her HCTZ since her blood pressure has been better with her weight loss.  She probably does not need the potassium supplement anymore either.  - I did encourage her to start walking at least 10 minutes a day for exercise  - Mammograms up-to-date and done in November  - Pap smear was done in 2023 and she is due in   - She got her flu vaccine in October  - Routine physical in 1 year              History of Present Illness     Patient presents today for annual physical.  She states that her HCTZ was discontinued by Dr. Molina especially since she has been losing a lot of weight with Zepbound which is also being managed by him.  Hematology has been managing her iron and vitamin B12 for which now she needs infusions for since the injection was not benefiting her.  She is here today primarily for her annual physical.    -She last saw the dentist 2 years ago.  She states that she does have an upcoming appointment in January  - She does wear glasses and she does see the eye doctor yearly  - She lost 40 pounds so far on the Zepbound which has been ordered by endocrinology/Dr. Molina.  She has been eating a much healthier diet.  She states that she has not been exercising.  - She states that she drinks alcohol socially.  Denies any tobacco use, drug use or vaping  - Her last mammogram was done on   - Her last Pap smear was in 2023  - She did get her flu vaccine in  "October      Review of Systems    Objective   /78 (BP Location: Left arm, Patient Position: Sitting, Cuff Size: Large)   Pulse 84   Temp (!) 97 °F (36.1 °C) (Tympanic)   Resp 18   Ht 5' 6\" (1.676 m)   Wt 119 kg (262 lb)   LMP 12/10/2024 (Exact Date)   SpO2 98%   BMI 42.29 kg/m²      Physical Exam  Vitals reviewed.   Constitutional:       General: She is not in acute distress.     Appearance: Normal appearance. She is well-developed. She is not ill-appearing, toxic-appearing or diaphoretic.   HENT:      Head: Normocephalic and atraumatic.   Neck:      Thyroid: No thyromegaly.   Cardiovascular:      Rate and Rhythm: Normal rate and regular rhythm.      Heart sounds: Normal heart sounds. No murmur heard.  Pulmonary:      Effort: Pulmonary effort is normal. No respiratory distress.      Breath sounds: Normal breath sounds. No wheezing, rhonchi or rales.   Abdominal:      General: Bowel sounds are normal.      Palpations: Abdomen is soft. There is no mass.      Tenderness: There is no abdominal tenderness.   Musculoskeletal:         General: No deformity.      Cervical back: Neck supple.      Right lower leg: No edema.      Left lower leg: No edema.   Lymphadenopathy:      Cervical: No cervical adenopathy.   Skin:     General: Skin is warm.   Neurological:      General: No focal deficit present.      Mental Status: She is alert.   Psychiatric:         Mood and Affect: Mood normal.         Behavior: Behavior normal.         Thought Content: Thought content normal.         Judgment: Judgment normal.         "

## 2025-01-16 ENCOUNTER — TRANSCRIBE ORDERS (OUTPATIENT)
Dept: LAB | Facility: HOSPITAL | Age: 45
End: 2025-01-16

## 2025-01-16 ENCOUNTER — APPOINTMENT (OUTPATIENT)
Dept: LAB | Facility: HOSPITAL | Age: 45
End: 2025-01-16
Payer: COMMERCIAL

## 2025-01-16 DIAGNOSIS — E53.8 B12 DEFICIENCY: ICD-10-CM

## 2025-01-16 DIAGNOSIS — E61.1 HYPOFERREMIA: Primary | ICD-10-CM

## 2025-01-16 LAB
ALBUMIN SERPL BCG-MCNC: 3.9 G/DL (ref 3.5–5)
ALP SERPL-CCNC: 72 U/L (ref 34–104)
ALT SERPL W P-5'-P-CCNC: 8 U/L (ref 7–52)
ANION GAP SERPL CALCULATED.3IONS-SCNC: 8 MMOL/L (ref 4–13)
AST SERPL W P-5'-P-CCNC: 11 U/L (ref 13–39)
BILIRUB SERPL-MCNC: 0.77 MG/DL (ref 0.2–1)
BUN SERPL-MCNC: 9 MG/DL (ref 5–25)
CALCIUM SERPL-MCNC: 8.7 MG/DL (ref 8.4–10.2)
CHLORIDE SERPL-SCNC: 104 MMOL/L (ref 96–108)
CO2 SERPL-SCNC: 26 MMOL/L (ref 21–32)
CREAT SERPL-MCNC: 0.69 MG/DL (ref 0.6–1.3)
EST. AVERAGE GLUCOSE BLD GHB EST-MCNC: 97 MG/DL
GFR SERPL CREATININE-BSD FRML MDRD: 106 ML/MIN/1.73SQ M
GLUCOSE P FAST SERPL-MCNC: 82 MG/DL (ref 65–99)
HBA1C MFR BLD: 5 %
POTASSIUM SERPL-SCNC: 3.6 MMOL/L (ref 3.5–5.3)
PROT SERPL-MCNC: 6.7 G/DL (ref 6.4–8.4)
SODIUM SERPL-SCNC: 138 MMOL/L (ref 135–147)
T4 FREE SERPL-MCNC: 0.89 NG/DL (ref 0.61–1.12)
TSH SERPL DL<=0.05 MIU/L-ACNC: 1.8 UIU/ML (ref 0.45–4.5)

## 2025-01-16 PROCEDURE — 80053 COMPREHEN METABOLIC PANEL: CPT

## 2025-01-16 PROCEDURE — 83036 HEMOGLOBIN GLYCOSYLATED A1C: CPT

## 2025-01-16 PROCEDURE — 84439 ASSAY OF FREE THYROXINE: CPT

## 2025-01-16 PROCEDURE — 84443 ASSAY THYROID STIM HORMONE: CPT

## 2025-01-16 PROCEDURE — 36415 COLL VENOUS BLD VENIPUNCTURE: CPT

## 2025-03-07 ENCOUNTER — TELEPHONE (OUTPATIENT)
Age: 45
End: 2025-03-07

## 2025-03-07 ENCOUNTER — TELEPHONE (OUTPATIENT)
Dept: HEMATOLOGY ONCOLOGY | Facility: CLINIC | Age: 45
End: 2025-03-07

## 2025-03-07 ENCOUNTER — TELEPHONE (OUTPATIENT)
Dept: INFUSION CENTER | Facility: HOSPITAL | Age: 45
End: 2025-03-07

## 2025-03-07 NOTE — TELEPHONE ENCOUNTER
Called and left voicemail to inform patient that keren would like to move her appt to early June. Scheduled patient for 6/2/2025 @ 10:30am at [facility]. Informed that if the date and time doesn't work to call the office and we can reschedule.

## 2025-03-07 NOTE — TELEPHONE ENCOUNTER
Per Geetha Wise: Please call pt and tell her we are scheduling her appts for iron. Please reschedule orders for her thanks.      Please assist with scheduling patient for iron infusions. Treatment plan is entered.     Thank you!

## 2025-03-07 NOTE — TELEPHONE ENCOUNTER
On 03/07 I called pt this morning and left her a voicemail.   She returned the call just now 03/07 @11:55am and I scheduled all her appts.

## 2025-03-24 ENCOUNTER — HOSPITAL ENCOUNTER (OUTPATIENT)
Dept: INFUSION CENTER | Facility: HOSPITAL | Age: 45
Discharge: HOME | End: 2025-03-24
Attending: INTERNAL MEDICINE
Payer: COMMERCIAL

## 2025-03-24 VITALS
RESPIRATION RATE: 20 BRPM | SYSTOLIC BLOOD PRESSURE: 128 MMHG | DIASTOLIC BLOOD PRESSURE: 72 MMHG | HEART RATE: 80 BPM | TEMPERATURE: 97.7 F

## 2025-03-24 DIAGNOSIS — E61.1 HYPOFERREMIA: ICD-10-CM

## 2025-03-24 DIAGNOSIS — E53.8 B12 DEFICIENCY: Primary | ICD-10-CM

## 2025-03-24 PROCEDURE — 96372 THER/PROPH/DIAG INJ SC/IM: CPT

## 2025-03-24 PROCEDURE — 96365 THER/PROPH/DIAG IV INF INIT: CPT

## 2025-03-24 RX ORDER — SODIUM CHLORIDE 9 MG/ML
20 INJECTION, SOLUTION INTRAVENOUS ONCE
OUTPATIENT
Start: 2025-03-31

## 2025-03-24 RX ORDER — SODIUM CHLORIDE 9 MG/ML
20 INJECTION, SOLUTION INTRAVENOUS ONCE
Status: COMPLETED | OUTPATIENT
Start: 2025-03-24 | End: 2025-03-24

## 2025-03-24 RX ORDER — CYANOCOBALAMIN 1000 UG/ML
1000 INJECTION, SOLUTION INTRAMUSCULAR; SUBCUTANEOUS ONCE
Status: COMPLETED | OUTPATIENT
Start: 2025-03-24 | End: 2025-03-24

## 2025-03-24 RX ORDER — CYANOCOBALAMIN 1000 UG/ML
1000 INJECTION, SOLUTION INTRAMUSCULAR; SUBCUTANEOUS ONCE
Start: 2025-03-31 | End: 2025-03-31

## 2025-03-24 RX ADMIN — SODIUM CHLORIDE 20 ML/HR: 9 INJECTION, SOLUTION INTRAVENOUS at 09:27

## 2025-03-24 RX ADMIN — IRON SUCROSE 200 MG: 20 INJECTION, SOLUTION INTRAVENOUS at 09:27

## 2025-03-24 RX ADMIN — CYANOCOBALAMIN 1000 MCG: 1000 INJECTION INTRAMUSCULAR; SUBCUTANEOUS at 09:25

## 2025-03-24 NOTE — PROGRESS NOTES
Received B12 IM inj in RIGHT deltoid & venofer infusion w/out incident. Offers nocomplaints. Confirmed next appt, declined AVS

## 2025-03-31 ENCOUNTER — HOSPITAL ENCOUNTER (OUTPATIENT)
Dept: INFUSION CENTER | Facility: HOSPITAL | Age: 45
Discharge: HOME | End: 2025-03-31
Attending: INTERNAL MEDICINE
Payer: COMMERCIAL

## 2025-03-31 VITALS
HEART RATE: 71 BPM | SYSTOLIC BLOOD PRESSURE: 108 MMHG | DIASTOLIC BLOOD PRESSURE: 71 MMHG | RESPIRATION RATE: 20 BRPM | TEMPERATURE: 97.9 F

## 2025-03-31 DIAGNOSIS — E53.8 B12 DEFICIENCY: Primary | ICD-10-CM

## 2025-03-31 DIAGNOSIS — E61.1 HYPOFERREMIA: ICD-10-CM

## 2025-03-31 PROCEDURE — 96365 THER/PROPH/DIAG IV INF INIT: CPT

## 2025-03-31 PROCEDURE — 96372 THER/PROPH/DIAG INJ SC/IM: CPT

## 2025-03-31 RX ORDER — SODIUM CHLORIDE 9 MG/ML
20 INJECTION, SOLUTION INTRAVENOUS ONCE
Status: CANCELLED | OUTPATIENT
Start: 2025-04-07

## 2025-03-31 RX ORDER — SODIUM CHLORIDE 9 MG/ML
20 INJECTION, SOLUTION INTRAVENOUS ONCE
Status: COMPLETED | OUTPATIENT
Start: 2025-03-31 | End: 2025-03-31

## 2025-03-31 RX ORDER — CYANOCOBALAMIN 1000 UG/ML
1000 INJECTION, SOLUTION INTRAMUSCULAR; SUBCUTANEOUS ONCE
Status: CANCELLED
Start: 2025-04-07 | End: 2025-04-07

## 2025-03-31 RX ORDER — CYANOCOBALAMIN 1000 UG/ML
1000 INJECTION, SOLUTION INTRAMUSCULAR; SUBCUTANEOUS ONCE
Status: COMPLETED | OUTPATIENT
Start: 2025-03-31 | End: 2025-03-31

## 2025-03-31 RX ADMIN — CYANOCOBALAMIN 1000 MCG: 1000 INJECTION INTRAMUSCULAR; SUBCUTANEOUS at 08:40

## 2025-03-31 RX ADMIN — IRON SUCROSE 200 MG: 20 INJECTION, SOLUTION INTRAVENOUS at 08:42

## 2025-03-31 RX ADMIN — SODIUM CHLORIDE 20 ML/HR: 9 INJECTION, SOLUTION INTRAVENOUS at 08:42

## 2025-04-07 ENCOUNTER — HOSPITAL ENCOUNTER (OUTPATIENT)
Dept: INFUSION CENTER | Facility: HOSPITAL | Age: 45
Discharge: HOME | End: 2025-04-07
Attending: INTERNAL MEDICINE
Payer: COMMERCIAL

## 2025-04-07 VITALS
HEART RATE: 79 BPM | DIASTOLIC BLOOD PRESSURE: 77 MMHG | RESPIRATION RATE: 20 BRPM | TEMPERATURE: 97.5 F | SYSTOLIC BLOOD PRESSURE: 124 MMHG

## 2025-04-07 DIAGNOSIS — E53.8 B12 DEFICIENCY: Primary | ICD-10-CM

## 2025-04-07 DIAGNOSIS — E61.1 HYPOFERREMIA: ICD-10-CM

## 2025-04-07 PROCEDURE — 96365 THER/PROPH/DIAG IV INF INIT: CPT

## 2025-04-07 PROCEDURE — 96372 THER/PROPH/DIAG INJ SC/IM: CPT

## 2025-04-07 RX ORDER — CYANOCOBALAMIN 1000 UG/ML
1000 INJECTION, SOLUTION INTRAMUSCULAR; SUBCUTANEOUS ONCE
Status: COMPLETED | OUTPATIENT
Start: 2025-04-07 | End: 2025-04-07

## 2025-04-07 RX ORDER — SODIUM CHLORIDE 9 MG/ML
20 INJECTION, SOLUTION INTRAVENOUS ONCE
Status: CANCELLED | OUTPATIENT
Start: 2025-04-14

## 2025-04-07 RX ORDER — CYANOCOBALAMIN 1000 UG/ML
1000 INJECTION, SOLUTION INTRAMUSCULAR; SUBCUTANEOUS ONCE
Status: CANCELLED
Start: 2025-04-14 | End: 2025-04-14

## 2025-04-07 RX ORDER — SODIUM CHLORIDE 9 MG/ML
20 INJECTION, SOLUTION INTRAVENOUS ONCE
Status: COMPLETED | OUTPATIENT
Start: 2025-04-07 | End: 2025-04-07

## 2025-04-07 RX ADMIN — SODIUM CHLORIDE 20 ML/HR: 0.9 INJECTION, SOLUTION INTRAVENOUS at 09:52

## 2025-04-07 RX ADMIN — CYANOCOBALAMIN 1000 MCG: 1000 INJECTION INTRAMUSCULAR; SUBCUTANEOUS at 09:55

## 2025-04-07 RX ADMIN — IRON SUCROSE 200 MG: 20 INJECTION, SOLUTION INTRAVENOUS at 09:55

## 2025-04-07 NOTE — PROGRESS NOTES
Pt tolerated venofer infusion and B12 injection to L deltoid without difficulty.  Confirmed next appt on 4/14 at 0900.  AVS declined.  Left ambulatory in stable condition.

## 2025-04-14 ENCOUNTER — HOSPITAL ENCOUNTER (OUTPATIENT)
Dept: INFUSION CENTER | Facility: HOSPITAL | Age: 45
Discharge: HOME | End: 2025-04-14
Attending: INTERNAL MEDICINE
Payer: COMMERCIAL

## 2025-04-14 VITALS
RESPIRATION RATE: 18 BRPM | TEMPERATURE: 97.5 F | SYSTOLIC BLOOD PRESSURE: 110 MMHG | HEART RATE: 77 BPM | DIASTOLIC BLOOD PRESSURE: 70 MMHG

## 2025-04-14 DIAGNOSIS — E61.1 HYPOFERREMIA: ICD-10-CM

## 2025-04-14 DIAGNOSIS — E53.8 B12 DEFICIENCY: Primary | ICD-10-CM

## 2025-04-14 PROCEDURE — 96372 THER/PROPH/DIAG INJ SC/IM: CPT

## 2025-04-14 PROCEDURE — 96365 THER/PROPH/DIAG IV INF INIT: CPT

## 2025-04-14 RX ORDER — SODIUM CHLORIDE 9 MG/ML
20 INJECTION, SOLUTION INTRAVENOUS ONCE
Status: COMPLETED | OUTPATIENT
Start: 2025-04-14 | End: 2025-04-14

## 2025-04-14 RX ORDER — CYANOCOBALAMIN 1000 UG/ML
1000 INJECTION, SOLUTION INTRAMUSCULAR; SUBCUTANEOUS ONCE
Status: CANCELLED
Start: 2025-04-21 | End: 2025-04-21

## 2025-04-14 RX ORDER — SODIUM CHLORIDE 9 MG/ML
20 INJECTION, SOLUTION INTRAVENOUS ONCE
Status: CANCELLED | OUTPATIENT
Start: 2025-04-21

## 2025-04-14 RX ORDER — CYANOCOBALAMIN 1000 UG/ML
1000 INJECTION, SOLUTION INTRAMUSCULAR; SUBCUTANEOUS ONCE
Status: COMPLETED | OUTPATIENT
Start: 2025-04-14 | End: 2025-04-14

## 2025-04-14 RX ADMIN — CYANOCOBALAMIN 1000 MCG: 1000 INJECTION INTRAMUSCULAR; SUBCUTANEOUS at 09:10

## 2025-04-14 RX ADMIN — SODIUM CHLORIDE 20 ML/HR: 9 INJECTION, SOLUTION INTRAVENOUS at 09:10

## 2025-04-14 RX ADMIN — IRON SUCROSE 200 MG: 20 INJECTION, SOLUTION INTRAVENOUS at 09:10

## 2025-04-14 NOTE — PROGRESS NOTES
Patient tolerated IV venofer without complications. Next appointment scheduled 4/21 at 0900[facility]. Schedule given.

## 2025-04-16 ENCOUNTER — APPOINTMENT (OUTPATIENT)
Dept: LAB | Facility: HOSPITAL | Age: 45
End: 2025-04-16
Payer: COMMERCIAL

## 2025-04-16 DIAGNOSIS — E55.9 VITAMIN D DEFICIENCY: ICD-10-CM

## 2025-04-16 DIAGNOSIS — E78.6 LOW HDL (UNDER 40): ICD-10-CM

## 2025-04-16 DIAGNOSIS — E89.0 POSTABLATIVE HYPOTHYROIDISM: ICD-10-CM

## 2025-04-16 LAB
25(OH)D3 SERPL-MCNC: 40.8 NG/ML (ref 30–100)
ALBUMIN SERPL BCG-MCNC: 4.2 G/DL (ref 3.5–5)
ALP SERPL-CCNC: 70 U/L (ref 34–104)
ALT SERPL W P-5'-P-CCNC: 6 U/L (ref 7–52)
ANION GAP SERPL CALCULATED.3IONS-SCNC: 7 MMOL/L (ref 4–13)
AST SERPL W P-5'-P-CCNC: 11 U/L (ref 13–39)
BILIRUB SERPL-MCNC: 1.24 MG/DL (ref 0.2–1)
BUN SERPL-MCNC: 9 MG/DL (ref 5–25)
CALCIUM SERPL-MCNC: 9.2 MG/DL (ref 8.4–10.2)
CHLORIDE SERPL-SCNC: 106 MMOL/L (ref 96–108)
CHOLEST SERPL-MCNC: 145 MG/DL (ref ?–200)
CO2 SERPL-SCNC: 27 MMOL/L (ref 21–32)
CREAT SERPL-MCNC: 0.67 MG/DL (ref 0.6–1.3)
GFR SERPL CREATININE-BSD FRML MDRD: 108 ML/MIN/1.73SQ M
GLUCOSE P FAST SERPL-MCNC: 80 MG/DL (ref 65–99)
HDLC SERPL-MCNC: 41 MG/DL
LDLC SERPL CALC-MCNC: 76 MG/DL (ref 0–100)
NONHDLC SERPL-MCNC: 104 MG/DL
POTASSIUM SERPL-SCNC: 4.1 MMOL/L (ref 3.5–5.3)
PROT SERPL-MCNC: 6.8 G/DL (ref 6.4–8.4)
SODIUM SERPL-SCNC: 140 MMOL/L (ref 135–147)
T4 FREE SERPL-MCNC: 0.98 NG/DL (ref 0.61–1.12)
TRIGL SERPL-MCNC: 140 MG/DL (ref ?–150)
TSH SERPL DL<=0.05 MIU/L-ACNC: 0.57 UIU/ML (ref 0.45–4.5)

## 2025-04-16 PROCEDURE — 84443 ASSAY THYROID STIM HORMONE: CPT

## 2025-04-16 PROCEDURE — 36415 COLL VENOUS BLD VENIPUNCTURE: CPT

## 2025-04-16 PROCEDURE — 84439 ASSAY OF FREE THYROXINE: CPT

## 2025-04-16 PROCEDURE — 82306 VITAMIN D 25 HYDROXY: CPT

## 2025-04-16 PROCEDURE — 80053 COMPREHEN METABOLIC PANEL: CPT

## 2025-04-16 PROCEDURE — 80061 LIPID PANEL: CPT

## 2025-04-21 ENCOUNTER — HOSPITAL ENCOUNTER (OUTPATIENT)
Dept: INFUSION CENTER | Facility: HOSPITAL | Age: 45
Discharge: HOME | End: 2025-04-21
Attending: INTERNAL MEDICINE
Payer: COMMERCIAL

## 2025-04-21 VITALS
DIASTOLIC BLOOD PRESSURE: 74 MMHG | TEMPERATURE: 97.5 F | RESPIRATION RATE: 20 BRPM | SYSTOLIC BLOOD PRESSURE: 108 MMHG | HEART RATE: 78 BPM

## 2025-04-21 DIAGNOSIS — E53.8 B12 DEFICIENCY: Primary | ICD-10-CM

## 2025-04-21 DIAGNOSIS — E61.1 HYPOFERREMIA: ICD-10-CM

## 2025-04-21 PROCEDURE — 96372 THER/PROPH/DIAG INJ SC/IM: CPT

## 2025-04-21 PROCEDURE — 96365 THER/PROPH/DIAG IV INF INIT: CPT

## 2025-04-21 RX ORDER — CYANOCOBALAMIN 1000 UG/ML
1000 INJECTION, SOLUTION INTRAMUSCULAR; SUBCUTANEOUS ONCE
Status: CANCELLED
Start: 2025-04-28 | End: 2025-04-28

## 2025-04-21 RX ORDER — SODIUM CHLORIDE 9 MG/ML
20 INJECTION, SOLUTION INTRAVENOUS ONCE
Status: COMPLETED | OUTPATIENT
Start: 2025-04-21 | End: 2025-04-21

## 2025-04-21 RX ORDER — CYANOCOBALAMIN 1000 UG/ML
1000 INJECTION, SOLUTION INTRAMUSCULAR; SUBCUTANEOUS ONCE
Status: COMPLETED | OUTPATIENT
Start: 2025-04-21 | End: 2025-04-21

## 2025-04-21 RX ORDER — SODIUM CHLORIDE 9 MG/ML
20 INJECTION, SOLUTION INTRAVENOUS ONCE
Status: CANCELLED | OUTPATIENT
Start: 2025-04-28

## 2025-04-21 RX ADMIN — CYANOCOBALAMIN 1000 MCG: 1000 INJECTION INTRAMUSCULAR; SUBCUTANEOUS at 09:06

## 2025-04-21 RX ADMIN — SODIUM CHLORIDE 20 ML/HR: 9 INJECTION, SOLUTION INTRAVENOUS at 09:03

## 2025-04-21 RX ADMIN — IRON SUCROSE 200 MG: 20 INJECTION, SOLUTION INTRAVENOUS at 09:03

## 2025-04-21 NOTE — PROGRESS NOTES
Pt tolerated IV Venofer today with no adverse reactions. Declined AVS. Next apt 4/28/25 @ 0900. Left unit ambulatory with a steady gait.

## 2025-04-28 ENCOUNTER — HOSPITAL ENCOUNTER (OUTPATIENT)
Dept: INFUSION CENTER | Facility: HOSPITAL | Age: 45
Discharge: HOME | End: 2025-04-28
Attending: INTERNAL MEDICINE
Payer: COMMERCIAL

## 2025-04-28 VITALS
DIASTOLIC BLOOD PRESSURE: 75 MMHG | HEART RATE: 71 BPM | SYSTOLIC BLOOD PRESSURE: 115 MMHG | RESPIRATION RATE: 18 BRPM | TEMPERATURE: 97.9 F

## 2025-04-28 DIAGNOSIS — E61.1 HYPOFERREMIA: ICD-10-CM

## 2025-04-28 DIAGNOSIS — E53.8 B12 DEFICIENCY: Primary | ICD-10-CM

## 2025-04-28 PROCEDURE — 96365 THER/PROPH/DIAG IV INF INIT: CPT

## 2025-04-28 PROCEDURE — 96372 THER/PROPH/DIAG INJ SC/IM: CPT

## 2025-04-28 RX ORDER — CYANOCOBALAMIN 1000 UG/ML
1000 INJECTION, SOLUTION INTRAMUSCULAR; SUBCUTANEOUS ONCE
Start: 2025-05-05 | End: 2025-05-05

## 2025-04-28 RX ORDER — SODIUM CHLORIDE 9 MG/ML
20 INJECTION, SOLUTION INTRAVENOUS ONCE
Status: COMPLETED | OUTPATIENT
Start: 2025-04-28 | End: 2025-04-28

## 2025-04-28 RX ORDER — SODIUM CHLORIDE 9 MG/ML
20 INJECTION, SOLUTION INTRAVENOUS ONCE
OUTPATIENT
Start: 2025-05-05

## 2025-04-28 RX ORDER — CYANOCOBALAMIN 1000 UG/ML
1000 INJECTION, SOLUTION INTRAMUSCULAR; SUBCUTANEOUS ONCE
Status: COMPLETED | OUTPATIENT
Start: 2025-04-28 | End: 2025-04-28

## 2025-04-28 RX ADMIN — SODIUM CHLORIDE 20 ML/HR: 9 INJECTION, SOLUTION INTRAVENOUS at 09:05

## 2025-04-28 RX ADMIN — IRON SUCROSE 200 MG: 20 INJECTION, SOLUTION INTRAVENOUS at 09:08

## 2025-04-28 RX ADMIN — CYANOCOBALAMIN 1000 MCG: 1000 INJECTION INTRAMUSCULAR; SUBCUTANEOUS at 08:53

## 2025-05-05 ENCOUNTER — HOSPITAL ENCOUNTER (OUTPATIENT)
Dept: INFUSION CENTER | Facility: HOSPITAL | Age: 45
Discharge: HOME | End: 2025-05-05
Attending: INTERNAL MEDICINE
Payer: COMMERCIAL

## 2025-05-05 VITALS
TEMPERATURE: 97.3 F | HEART RATE: 76 BPM | RESPIRATION RATE: 18 BRPM | SYSTOLIC BLOOD PRESSURE: 103 MMHG | DIASTOLIC BLOOD PRESSURE: 70 MMHG

## 2025-05-05 DIAGNOSIS — E61.1 HYPOFERREMIA: ICD-10-CM

## 2025-05-05 DIAGNOSIS — E53.8 B12 DEFICIENCY: Primary | ICD-10-CM

## 2025-05-05 PROCEDURE — 96365 THER/PROPH/DIAG IV INF INIT: CPT

## 2025-05-05 PROCEDURE — 96372 THER/PROPH/DIAG INJ SC/IM: CPT

## 2025-05-05 RX ORDER — SODIUM CHLORIDE 9 MG/ML
20 INJECTION, SOLUTION INTRAVENOUS ONCE
Status: COMPLETED | OUTPATIENT
Start: 2025-05-05 | End: 2025-05-05

## 2025-05-05 RX ORDER — SODIUM CHLORIDE 9 MG/ML
20 INJECTION, SOLUTION INTRAVENOUS ONCE
Status: CANCELLED | OUTPATIENT
Start: 2025-05-12

## 2025-05-05 RX ORDER — CYANOCOBALAMIN 1000 UG/ML
1000 INJECTION, SOLUTION INTRAMUSCULAR; SUBCUTANEOUS ONCE
Status: COMPLETED | OUTPATIENT
Start: 2025-05-05 | End: 2025-05-05

## 2025-05-05 RX ORDER — CYANOCOBALAMIN 1000 UG/ML
1000 INJECTION, SOLUTION INTRAMUSCULAR; SUBCUTANEOUS ONCE
Start: 2025-05-05 | End: 2025-05-12

## 2025-05-05 RX ADMIN — SODIUM CHLORIDE 20 ML/HR: 9 INJECTION, SOLUTION INTRAVENOUS at 08:50

## 2025-05-05 RX ADMIN — IRON SUCROSE 200 MG: 20 INJECTION, SOLUTION INTRAVENOUS at 08:54

## 2025-05-05 RX ADMIN — CYANOCOBALAMIN 1000 MCG: 1000 INJECTION INTRAMUSCULAR; SUBCUTANEOUS at 08:48

## 2025-05-05 NOTE — PROGRESS NOTES
Pt received B12 IM inj in LEFT deltoid & venofer infusion w/out incident, offers no complaints. Completed tx, no further appts scheduled

## 2025-05-22 ENCOUNTER — HOSPITAL ENCOUNTER (OUTPATIENT)
Dept: MAMMOGRAPHY | Facility: CLINIC | Age: 45
End: 2025-05-22
Payer: COMMERCIAL

## 2025-05-22 VITALS — WEIGHT: 262 LBS | HEIGHT: 66 IN | BODY MASS INDEX: 42.11 KG/M2

## 2025-05-22 DIAGNOSIS — R92.8 ABNORMAL MAMMOGRAM: ICD-10-CM

## 2025-05-22 PROCEDURE — G0279 TOMOSYNTHESIS, MAMMO: HCPCS

## 2025-05-22 PROCEDURE — 76642 ULTRASOUND BREAST LIMITED: CPT

## 2025-05-22 PROCEDURE — 77066 DX MAMMO INCL CAD BI: CPT

## 2025-05-29 DIAGNOSIS — E53.8 B12 DEFICIENCY: Primary | ICD-10-CM

## 2025-05-29 DIAGNOSIS — D50.0 IRON DEFICIENCY ANEMIA DUE TO CHRONIC BLOOD LOSS: ICD-10-CM

## 2025-05-30 ENCOUNTER — APPOINTMENT (OUTPATIENT)
Dept: LAB | Facility: HOSPITAL | Age: 45
End: 2025-05-30
Payer: COMMERCIAL

## 2025-05-30 DIAGNOSIS — E53.8 B12 DEFICIENCY: ICD-10-CM

## 2025-05-30 DIAGNOSIS — D50.0 IRON DEFICIENCY ANEMIA DUE TO CHRONIC BLOOD LOSS: ICD-10-CM

## 2025-05-30 LAB
BASOPHILS # BLD AUTO: 0.02 THOUSANDS/ÂΜL (ref 0–0.1)
BASOPHILS NFR BLD AUTO: 0 % (ref 0–1)
EOSINOPHIL # BLD AUTO: 0.12 THOUSAND/ÂΜL (ref 0–0.61)
EOSINOPHIL NFR BLD AUTO: 2 % (ref 0–6)
ERYTHROCYTE [DISTWIDTH] IN BLOOD BY AUTOMATED COUNT: 13.5 % (ref 11.6–15.1)
FERRITIN SERPL-MCNC: 108 NG/ML (ref 30–307)
FOLATE SERPL-MCNC: 13.7 NG/ML
HCT VFR BLD AUTO: 42 % (ref 34.8–46.1)
HGB BLD-MCNC: 13.4 G/DL (ref 11.5–15.4)
IMM GRANULOCYTES # BLD AUTO: 0.02 THOUSAND/UL (ref 0–0.2)
IMM GRANULOCYTES NFR BLD AUTO: 0 % (ref 0–2)
IRON SATN MFR SERPL: 30 % (ref 15–50)
IRON SERPL-MCNC: 77 UG/DL (ref 50–212)
LYMPHOCYTES # BLD AUTO: 0.98 THOUSANDS/ÂΜL (ref 0.6–4.47)
LYMPHOCYTES NFR BLD AUTO: 20 % (ref 14–44)
MCH RBC QN AUTO: 28.8 PG (ref 26.8–34.3)
MCHC RBC AUTO-ENTMCNC: 31.9 G/DL (ref 31.4–37.4)
MCV RBC AUTO: 90 FL (ref 82–98)
MONOCYTES # BLD AUTO: 0.34 THOUSAND/ÂΜL (ref 0.17–1.22)
MONOCYTES NFR BLD AUTO: 7 % (ref 4–12)
NEUTROPHILS # BLD AUTO: 3.48 THOUSANDS/ÂΜL (ref 1.85–7.62)
NEUTS SEG NFR BLD AUTO: 71 % (ref 43–75)
NRBC BLD AUTO-RTO: 0 /100 WBCS
PLATELET # BLD AUTO: 234 THOUSANDS/UL (ref 149–390)
PMV BLD AUTO: 10.5 FL (ref 8.9–12.7)
RBC # BLD AUTO: 4.65 MILLION/UL (ref 3.81–5.12)
TIBC SERPL-MCNC: 260.4 UG/DL (ref 250–450)
TRANSFERRIN SERPL-MCNC: 186 MG/DL (ref 203–362)
UIBC SERPL-MCNC: 183 UG/DL (ref 155–355)
VIT B12 SERPL-MCNC: 227 PG/ML (ref 180–914)
WBC # BLD AUTO: 4.96 THOUSAND/UL (ref 4.31–10.16)

## 2025-05-30 PROCEDURE — 83550 IRON BINDING TEST: CPT

## 2025-05-30 PROCEDURE — 82607 VITAMIN B-12: CPT

## 2025-05-30 PROCEDURE — 85025 COMPLETE CBC W/AUTO DIFF WBC: CPT

## 2025-05-30 PROCEDURE — 83540 ASSAY OF IRON: CPT

## 2025-05-30 PROCEDURE — 82746 ASSAY OF FOLIC ACID SERUM: CPT

## 2025-05-30 PROCEDURE — 82728 ASSAY OF FERRITIN: CPT

## 2025-05-30 PROCEDURE — 36415 COLL VENOUS BLD VENIPUNCTURE: CPT

## 2025-06-02 ENCOUNTER — OFFICE VISIT (OUTPATIENT)
Dept: HEMATOLOGY ONCOLOGY | Facility: CLINIC | Age: 45
End: 2025-06-02
Payer: COMMERCIAL

## 2025-06-02 VITALS
RESPIRATION RATE: 16 BRPM | OXYGEN SATURATION: 94 % | WEIGHT: 253.5 LBS | DIASTOLIC BLOOD PRESSURE: 70 MMHG | HEIGHT: 66 IN | TEMPERATURE: 97.3 F | BODY MASS INDEX: 40.74 KG/M2 | SYSTOLIC BLOOD PRESSURE: 124 MMHG | HEART RATE: 77 BPM

## 2025-06-02 DIAGNOSIS — D50.0 IRON DEFICIENCY ANEMIA DUE TO CHRONIC BLOOD LOSS: Primary | ICD-10-CM

## 2025-06-02 DIAGNOSIS — E53.8 B12 DEFICIENCY: ICD-10-CM

## 2025-06-02 PROCEDURE — 99213 OFFICE O/P EST LOW 20 MIN: CPT | Performed by: PHYSICIAN ASSISTANT

## 2025-06-02 RX ORDER — ASCORBIC ACID 500 MG
1 TABLET ORAL DAILY
COMMUNITY
Start: 2025-04-18

## 2025-06-02 RX ORDER — SYRINGE W-NEEDLE,DISPOSAB,3 ML 25GX5/8"
SYRINGE, EMPTY DISPOSABLE MISCELLANEOUS
COMMUNITY
Start: 2025-01-17

## 2025-06-02 NOTE — PROGRESS NOTES
Name: Maritza Butt      : 1980      MRN: [id number]  Encounter Provider: Geetha Wise PA-C  Encounter Date: 2025   Encounter department: [facility] [place]  :  Assessment & Plan  Iron deficiency anemia due to chronic blood loss  This is a 45-year-old female with past medical history of heavy menses for 2 days who has developed iron deficiency anemia and has had had a degree of iron deficiency anemia since .  Patient will follow-up with gynecology however, since the etiology is not confirmed, recommendation for follow-up with GI for upper and lower endoscopy for assessment of blood loss anemia.  Patient was agreeable especially since she will be due for colonoscopy in approximately a year for normal screening prior to her episodes.    It is possible the patient will require additional IV iron.  Recommendation to follow-up in 6 months.  In the meantime, patient will work on consult with GYN as well as with GI and moving forward with endoscopic evaluation.    Remain available if patient or other specialist have any questions or concerns regarding the above.  Orders:  •  Ambulatory referral to Gastroenterology; Future  •  CBC and differential; Future  •  Iron Panel (Includes Ferritin, Iron Sat%, Iron, and TIBC); Future  •  Vitamin B12; Future  •  Folate; Future    B12 deficiency  Patient tolerated IM B12 injections without toxicity.  Recommendation for compliance on oral supplement.    Regimen:  B12 1000 mcg gummy daily.  Orders:  •  Ambulatory referral to Gastroenterology; Future  •  CBC and differential; Future  •  Iron Panel (Includes Ferritin, Iron Sat%, Iron, and TIBC); Future  •  Vitamin B12; Future  •  Folate; Future    No follow-ups on file.    History of Present Illness   Chief Complaint   Patient presents with   • Follow-up     Pertinent Medical History   This is a 44-year-old female with past medical history of obesity, depression,  hypertension, postsurgical hypothyroidism following with endocrinology who presents to the hematology clinic for evaluation of iron deficiency anemia in 2024.     4/4/2017     WBC 7.28, hemoglobin 13.1, MCV 83, RDW 16.7, platelet 245  12/29/2018 WBC 6.0, hemoglobin 11.3, MCV 79, RDW 16.6, platelets 267  5/1/2020     WBC 4.6, hemoglobin 12.0, MCV 78, RDW 18.2, platelet 256  5/7/2021     WBC 4.4, hemoglobin 11.6, MCV 87 D8, RDW 17.2, platelet 283  3/7/22         WBC 6.1, hemoglobin 10.6, MCV 74, RDW 17.4, platelet 296  9/1/2023     WBC 5.4, hemoglobin 9.2, MCV 72, RDW 19.6, platelet 344  10/11/2023                          Ferritin = 9; started on oral iron supplements                          Iron saturation 10%, TIBC 369  11/6/2023   WBC 4.9, hemoglobin 11.8, MCV 80, RDW 20.0, platelet 298  Ferritin 21; recommended increasing oral supplements to twice a day with vitamin C  Iron saturation 12 TIBC 336     Menstrual history: Patient has noted crease in cycle length to approximately 21 days.  Occasionally patient will have her period twice a month.  Patient bleeds heavily for 3 days changing a pad or tampon every 2-3 hours.  Patient is not on birth control.  Patient had tried it 25 years ago and has not gone back to it.  No tobacco use.    12/6/2024: Ferritin 12   01/04/24: Mild stomach upset associated with nausea with oral iron.  Patient notes intermittent compliance.  Notes intermittent headaches some worse than others.  Fatigue.  No easy bruising.  Sleeping well.  3/24/25-5/5/2025: Venofer 200 mg IV weekly x 7+ B12 1000 mcg   5/30/2025 WBC 4.9, hemoglobin 13.4, MCV 90, platelet count 234    Vitamin B12 227, folate 13.7, iron saturation 30%, ferritin 108  06/02/25: Notes improvement.  Still with some fatigue not compliant with oral B12     Review of Systems   Constitutional:  Positive for fatigue. Negative for appetite change, fever and unexpected weight change.   HENT:  Negative for nosebleeds.    Respiratory:   "Negative for cough, choking and shortness of breath.         Negative hemoptysis.   Cardiovascular:  Negative for chest pain, palpitations and leg swelling.   Gastrointestinal: Negative.  Negative for abdominal distention, abdominal pain, anal bleeding, blood in stool, constipation, diarrhea, nausea and vomiting.   Endocrine: Negative.  Negative for cold intolerance.   Genitourinary: Negative.  Negative for hematuria, menstrual problem, vaginal bleeding, vaginal discharge and vaginal pain.   Musculoskeletal: Negative.  Negative for arthralgias, myalgias, neck pain and neck stiffness.   Skin: Negative.  Negative for color change, pallor and rash.   Allergic/Immunologic: Negative.  Negative for immunocompromised state.   Neurological: Negative.  Negative for weakness and headaches.   Hematological:  Negative for adenopathy. Does not bruise/bleed easily.   All other systems reviewed and are negative.      Objective   /70 (BP Location: Left arm, Patient Position: Sitting, Cuff Size: Adult)   Pulse 77   Temp (!) 97.3 °F (36.3 °C) (Temporal)   Resp 16   Ht 5' 6\" (1.676 m)   Wt 115 kg (253 lb 8 oz)   LMP 04/07/2025   SpO2 94%   BMI 40.92 kg/m²     Physical Exam  Constitutional:       General: She is not in acute distress.     Appearance: She is well-developed.   HENT:      Head: Normocephalic and atraumatic.     Eyes:      General: No scleral icterus.     Conjunctiva/sclera: Conjunctivae normal.       Cardiovascular:      Rate and Rhythm: Normal rate.   Pulmonary:      Effort: Pulmonary effort is normal. No respiratory distress.     Skin:     General: Skin is warm.      Coloration: Skin is not pale.      Findings: No rash.     Neurological:      Mental Status: She is alert and oriented to person, place, and time.     Psychiatric:         Thought Content: Thought content normal.       Labs: I have reviewed the following labs:  Results for orders placed or performed in visit on 05/30/25   [facility] Panel (incl. " Iron, TIBC, % Iron Saturation)   Result Value Ref Range    Iron Saturation 30 15 - 50 %    TIBC 260.4 250 - 450 ug/dL    Iron 77 50 - 212 ug/dL    Transferrin 186 (L) 203 - 362 mg/dL    UIBC 183 155 - 355 ug/dL   Result Value Ref Range    Ferritin 108 30 - 307 ng/mL     Administrative Statements   I have spent a total time of 28 minutes in caring for this patient on the day of the visit/encounter including Instructions for management, Patient and family education, Risk factor reductions, and Documenting in the medical record.

## 2025-06-02 NOTE — ASSESSMENT & PLAN NOTE
Patient tolerated IM B12 injections without toxicity.  Recommendation for compliance on oral supplement.    Regimen:  B12 1000 mcg gummy daily.  Orders:  •  Ambulatory referral to Gastroenterology; Future  •  CBC and differential; Future  •  Iron Panel (Includes Ferritin, Iron Sat%, Iron, and TIBC); Future  •  Vitamin B12; Future  •  Folate; Future

## 2025-07-03 ENCOUNTER — TELEPHONE (OUTPATIENT)
Age: 45
End: 2025-07-03

## 2025-07-03 NOTE — TELEPHONE ENCOUNTER
Form was filled out and given to Dr. Bourgeois to sign, also [place] for daughter was also filled out and given to Christine to sign.

## 2025-07-03 NOTE — TELEPHONE ENCOUNTER
Patient dropped off FMLA forms and a physician cert form. Both have been placed in Aida's bin for completion.

## 2025-07-18 ENCOUNTER — OFFICE VISIT (OUTPATIENT)
Age: 45
End: 2025-07-18
Attending: PHYSICIAN ASSISTANT
Payer: COMMERCIAL

## 2025-07-18 VITALS
TEMPERATURE: 97.5 F | SYSTOLIC BLOOD PRESSURE: 128 MMHG | OXYGEN SATURATION: 99 % | WEIGHT: 252.2 LBS | BODY MASS INDEX: 40.53 KG/M2 | DIASTOLIC BLOOD PRESSURE: 70 MMHG | HEART RATE: 74 BPM | HEIGHT: 66 IN

## 2025-07-18 DIAGNOSIS — D50.8 IRON DEFICIENCY ANEMIA SECONDARY TO INADEQUATE DIETARY IRON INTAKE: Primary | ICD-10-CM

## 2025-07-18 DIAGNOSIS — D50.0 IRON DEFICIENCY ANEMIA DUE TO CHRONIC BLOOD LOSS: ICD-10-CM

## 2025-07-18 DIAGNOSIS — E53.8 B12 DEFICIENCY: ICD-10-CM

## 2025-07-18 PROCEDURE — 99203 OFFICE O/P NEW LOW 30 MIN: CPT | Performed by: PHYSICIAN ASSISTANT

## 2025-07-18 RX ORDER — SODIUM CHLORIDE, SODIUM LACTATE, POTASSIUM CHLORIDE, CALCIUM CHLORIDE 600; 310; 30; 20 MG/100ML; MG/100ML; MG/100ML; MG/100ML
125 INJECTION, SOLUTION INTRAVENOUS CONTINUOUS
OUTPATIENT
Start: 2025-07-18

## 2025-07-18 NOTE — PROGRESS NOTES
Name: Maritza Butt      : 1980      MRN: [id number]  Encounter Provider: Pasha Flowers PA-C  Encounter Date: 2025   Encounter department: [facility] [place]  :  Assessment & Plan  Iron deficiency anemia secondary to inadequate dietary iron intake  - Likely due to patient's irregular and heavy menstrual cycle  -No overt signs of bleeding  -Plan EGD to rule out upper GI bleeding or neoplasm   - Would also request small bowel biopsy to rule out celiac disease  -Plan colonoscopy to rule out lower GI bleeding or neoplasm  - I have reviewed the risks of endoscopy which include but are not limited to; anesthesia complications, injury/perforation of the bowel, infection and bleeding.         Iron deficiency anemia due to chronic blood loss    Orders:  •  Ambulatory referral to Gastroenterology  •  Colonoscopy; Future  •  EGD; Future    B12 deficiency    Orders:  •  Ambulatory referral to Gastroenterology        History of Present Illness   Maritza Butt is a 45 y.o. female new to our office with past medical history of hypertension, obstructive sleep apnea, history of Graves' disease and now hypothyroidism status post radioactive iodine, dysfunctional uterine bleeding, iron deficiency, hypertriglyceridemia, B12 deficiency,  who presents due to iron deficiency anemia.    Previously on oral iron supplementation.  On May 30, 2025 B12 and folate normal.  CBC with normal hemoglobin at 13.4 and normal indices.  Iron panel essentially normal.    Anemia  Years of known anemia -based on records first noted in 2018  Overt bleeding (eg nose bleeds, vomiting blood/coffee grounds, black/bloody stool or urinating blood) - No  History of bariatric surgery - No  History of bowel resection - No  History of blood transfusions - No  History of Iron supplementation -has been on oral iron  Vegetarian - No  Menstrual Cycle - N/A  Last EGD -none  previously  Last Colonoscopy -none previously    Patient denies any abdominal pain, nausea/vomiting, pain or difficulty swallowing, change in bowels, black or bloody stools  No pacemaker or defibrillator implanted.   No chronic kidney disease or solitary kidney.  Not on any supplemental oxygen at night.  Not on any antiplatelet or anticoagulants.  BMI is 40.71           HPI  History obtained from: patient  Review of Systems   Constitutional:  Negative for activity change, appetite change, chills, diaphoresis, fatigue and unexpected weight change.   HENT:  Negative for mouth sores, sore throat and trouble swallowing.    Eyes:  Negative for pain, redness and visual disturbance.   Respiratory:  Negative for apnea, chest tightness and shortness of breath.    Cardiovascular:  Negative for chest pain and leg swelling.   Gastrointestinal:  Negative for abdominal distention, abdominal pain, anal bleeding, blood in stool, constipation, diarrhea, nausea and vomiting.   Genitourinary:  Negative for difficulty urinating, dysuria and hematuria.   Musculoskeletal:  Negative for arthralgias, back pain, gait problem, joint swelling and myalgias.   Skin:  Negative for color change, pallor and rash.   Allergic/Immunologic: Negative for environmental allergies and food allergies.   Neurological:  Negative for dizziness, weakness, light-headedness, numbness and headaches.   Psychiatric/Behavioral:  Negative for agitation and behavioral problems.     A complete review of systems is negative other than that noted above in the HPI.    Past Medical History   Past Medical History[1]  Past Surgical History[1]  Family History[1]   reports that she has never smoked. She has never been exposed to tobacco smoke. She has never used smokeless tobacco. She reports that she does not currently use alcohol. She reports that she does not use drugs.  Current Outpatient Medications   Medication Instructions   • ascorbic acid (VITAMIN C) 500 mg tablet 1  "tablet, Daily   • Cyanocobalamin 1,000 mcg, Every 30 days   • ergocalciferol (VITAMIN D2) 50,000 Units, Weekly   • ferrous sulfate 325 mg, Oral, Daily with breakfast   • levothyroxine 150 mcg tablet    • levothyroxine 175 mcg, Oral, Daily   • potassium chloride (Klor-Con M20) 20 mEq tablet 20 mEq, Oral, Daily   • Syringe/Needle, Disp, (SYRINGE 3CC/21GX1\") 21G X 1\" 3 ML MISC Use for vit. B12 injections monthly   • vitamin B-12 (VITAMIN B-12) 1,000 mcg, Oral, Daily   • Zepbound 15 MG/0.5ML auto-injector    Allergies[1]   Medications Ordered Prior to Encounter[1]   Social History[1]     Objective   /70 (BP Location: Left arm, Patient Position: Sitting, Cuff Size: Standard)   Pulse 74   Temp 97.5 °F (36.4 °C) (Tympanic)   Ht 5' 6\" (1.676 m)   Wt 114 kg (252 lb 3.2 oz)   SpO2 99%   BMI 40.71 kg/m²     Physical Exam  Vitals reviewed.   Constitutional:       General: She is not in acute distress.     Appearance: Normal appearance. She is not ill-appearing.   HENT:      Head: Normocephalic and atraumatic.     Eyes:      General: No scleral icterus.     Conjunctiva/sclera: Conjunctivae normal.       Cardiovascular:      Rate and Rhythm: Normal rate and regular rhythm.   Pulmonary:      Effort: Pulmonary effort is normal. No respiratory distress.      Breath sounds: Normal breath sounds.   Abdominal:      General: Bowel sounds are normal. There is no distension.      Palpations: Abdomen is soft.      Tenderness: There is no abdominal tenderness. There is no guarding.     Skin:     General: Skin is warm and dry.     Neurological:      Mental Status: She is alert and oriented to person, place, and time.     Psychiatric:         Mood and Affect: Mood normal.         Behavior: Behavior normal.            Lab Results: I personally reviewed relevant lab results.               Pasha Flowers PA-C  [facility] - Gastroentrology         [1]  Past Medical History:  Diagnosis Date   • Anemia    • " Anxiety    • Chronic kidney disease    • Class 3 severe obesity due to excess calories with serious comorbidity and body mass index (BMI) of 45.0 to 49.9 in adult 12/10/2018   • Depression    • Disease of thyroid gland    • Edema of both legs 2012   • Hypertension    • Iron deficiency anemia secondary to inadequate dietary iron intake 01/02/2019   • Macrocytosis 2012   • Obesity    • Visual impairment    • Vitamin D deficiency    [1]  Past Surgical History:  Procedure Laterality Date   • BREAST BIOPSY Bilateral 05/30/2024   • THYROID SURGERY      ablation   • TUBAL LIGATION Bilateral 2009   • US GUIDANCE BREAST BIOPSY LEFT EACH ADDITIONAL Left 5/30/2024   • US GUIDED BREAST BIOPSY RIGHT COMPLETE Right 5/30/2024   [1]  Family History  Problem Relation Name Age of Onset   • Breast cancer Mother Sienna Eden 37        Breast cancer   • No Known Problems Father     • No Known Problems Sister     • No Known Problems Daughter     • Breast cancer Maternal Grandmother Kole Ugarte 45        Breast cancer   • No Known Problems Maternal Grandfather     • No Known Problems Paternal Grandmother     • No Known Problems Paternal Grandfather     • Breast cancer Maternal Aunt Ancelmo Eden 37        Breast cancer   • Breast cancer Maternal Aunt  57   • No Known Problems Maternal Aunt     • No Known Problems Maternal Aunt     • No Known Problems Maternal Aunt     • No Known Problems Paternal Aunt     • Breast cancer Cousin x3    • Breast cancer Cousin     • Breast cancer Cousin     • Breast cancer Family     [1]  No Known Allergies[1]  Current Outpatient Medications on File Prior to Visit   Medication Sig Dispense Refill   • ascorbic acid (VITAMIN C) 500 mg tablet Take 1 tablet by mouth in the morning     • ergocalciferol (VITAMIN D2) 50,000 units Take 50,000 Units by mouth once a week     • levothyroxine 150 mcg tablet  (Patient taking differently: Take 150 mcg by mouth once a week SUNDAYS ONLY)     • levothyroxine 175 mcg  "tablet Take 1 tablet (175 mcg total) by mouth daily (Patient taking differently: Take 175 mcg by mouth in the morning. MONDAY THROUGH SATURDAY ONLY.) 90 tablet 1   • potassium chloride (Klor-Con M20) 20 mEq tablet Take 1 tablet (20 mEq total) by mouth daily 90 tablet 1   • vitamin B-12 (VITAMIN B-12) 1,000 mcg tablet Take 1 tablet (1,000 mcg total) by mouth daily 90 tablet 1   • Zepbound 15 MG/0.5ML auto-injector      • Cyanocobalamin 1000 MCG/ML KIT Inject 1,000 mcg into a muscle every 30 (thirty) days (Patient not taking: Reported on 6/2/2025)     • ferrous sulfate 325 (65 Fe) mg tablet Take 1 tablet (325 mg total) by mouth daily with breakfast (Patient not taking: Reported on 3/24/2025) 90 tablet 3   • Syringe/Needle, Disp, (SYRINGE 3CC/21GX1\") 21G X 1\" 3 ML MISC Use for vit. B12 injections monthly (Patient not taking: Reported on 6/2/2025)       No current facility-administered medications on file prior to visit.   [1]  Social History  Tobacco Use   • Smoking status: Never     Passive exposure: Never   • Smokeless tobacco: Never   Vaping Use   • Vaping status: Never Used   Substance and Sexual Activity   • Alcohol use: Not Currently   • Drug use: Never   • Sexual activity: Yes     Partners: Male     Birth control/protection: Female Sterilization, None     "

## 2025-07-18 NOTE — ASSESSMENT & PLAN NOTE
- Likely due to patient's irregular and heavy menstrual cycle  -No overt signs of bleeding  -Plan EGD to rule out upper GI bleeding or neoplasm   - Would also request small bowel biopsy to rule out celiac disease  -Plan colonoscopy to rule out lower GI bleeding or neoplasm  - I have reviewed the risks of endoscopy which include but are not limited to; anesthesia complications, injury/perforation of the bowel, infection and bleeding.

## 2025-07-22 ENCOUNTER — APPOINTMENT (OUTPATIENT)
Dept: LAB | Facility: HOSPITAL | Age: 45
End: 2025-07-22
Payer: COMMERCIAL

## 2025-07-22 DIAGNOSIS — E89.0 POSTABLATIVE HYPOTHYROIDISM: ICD-10-CM

## 2025-07-22 LAB
ALBUMIN SERPL BCG-MCNC: 4.4 G/DL (ref 3.5–5)
ALP SERPL-CCNC: 68 U/L (ref 34–104)
ALT SERPL W P-5'-P-CCNC: 7 U/L (ref 7–52)
ANION GAP SERPL CALCULATED.3IONS-SCNC: 8 MMOL/L (ref 4–13)
AST SERPL W P-5'-P-CCNC: 10 U/L (ref 13–39)
BASOPHILS # BLD AUTO: 0.02 THOUSANDS/ÂΜL (ref 0–0.1)
BASOPHILS NFR BLD AUTO: 0 % (ref 0–1)
BILIRUB SERPL-MCNC: 0.75 MG/DL (ref 0.2–1)
BUN SERPL-MCNC: 10 MG/DL (ref 5–25)
CALCIUM SERPL-MCNC: 9.4 MG/DL (ref 8.4–10.2)
CHLORIDE SERPL-SCNC: 105 MMOL/L (ref 96–108)
CO2 SERPL-SCNC: 26 MMOL/L (ref 21–32)
CREAT SERPL-MCNC: 0.67 MG/DL (ref 0.6–1.3)
EOSINOPHIL # BLD AUTO: 0.14 THOUSAND/ÂΜL (ref 0–0.61)
EOSINOPHIL NFR BLD AUTO: 3 % (ref 0–6)
ERYTHROCYTE [DISTWIDTH] IN BLOOD BY AUTOMATED COUNT: 12.6 % (ref 11.6–15.1)
GFR SERPL CREATININE-BSD FRML MDRD: 107 ML/MIN/1.73SQ M
GLUCOSE P FAST SERPL-MCNC: 85 MG/DL (ref 65–99)
HCT VFR BLD AUTO: 42.4 % (ref 34.8–46.1)
HGB BLD-MCNC: 13.9 G/DL (ref 11.5–15.4)
IMM GRANULOCYTES # BLD AUTO: 0.01 THOUSAND/UL (ref 0–0.2)
IMM GRANULOCYTES NFR BLD AUTO: 0 % (ref 0–2)
LYMPHOCYTES # BLD AUTO: 1.03 THOUSANDS/ÂΜL (ref 0.6–4.47)
LYMPHOCYTES NFR BLD AUTO: 20 % (ref 14–44)
MCH RBC QN AUTO: 29.4 PG (ref 26.8–34.3)
MCHC RBC AUTO-ENTMCNC: 32.8 G/DL (ref 31.4–37.4)
MCV RBC AUTO: 90 FL (ref 82–98)
MONOCYTES # BLD AUTO: 0.36 THOUSAND/ÂΜL (ref 0.17–1.22)
MONOCYTES NFR BLD AUTO: 7 % (ref 4–12)
NEUTROPHILS # BLD AUTO: 3.57 THOUSANDS/ÂΜL (ref 1.85–7.62)
NEUTS SEG NFR BLD AUTO: 70 % (ref 43–75)
NRBC BLD AUTO-RTO: 0 /100 WBCS
PLATELET # BLD AUTO: 220 THOUSANDS/UL (ref 149–390)
PMV BLD AUTO: 10.9 FL (ref 8.9–12.7)
POTASSIUM SERPL-SCNC: 4.8 MMOL/L (ref 3.5–5.3)
PROT SERPL-MCNC: 7.4 G/DL (ref 6.4–8.4)
RBC # BLD AUTO: 4.73 MILLION/UL (ref 3.81–5.12)
SODIUM SERPL-SCNC: 139 MMOL/L (ref 135–147)
T4 FREE SERPL-MCNC: 1.02 NG/DL (ref 0.61–1.12)
TSH SERPL DL<=0.05 MIU/L-ACNC: 0.49 UIU/ML (ref 0.45–4.5)
WBC # BLD AUTO: 5.13 THOUSAND/UL (ref 4.31–10.16)

## 2025-07-22 PROCEDURE — 36415 COLL VENOUS BLD VENIPUNCTURE: CPT

## 2025-07-22 PROCEDURE — 84439 ASSAY OF FREE THYROXINE: CPT

## 2025-07-22 PROCEDURE — 85025 COMPLETE CBC W/AUTO DIFF WBC: CPT

## 2025-07-22 PROCEDURE — 84443 ASSAY THYROID STIM HORMONE: CPT

## 2025-07-22 PROCEDURE — 80053 COMPREHEN METABOLIC PANEL: CPT

## 2025-08-08 ENCOUNTER — ANESTHESIA (OUTPATIENT)
Dept: GASTROENTEROLOGY | Facility: HOSPITAL | Age: 45
End: 2025-08-08
Payer: COMMERCIAL

## 2025-08-08 ENCOUNTER — ANESTHESIA EVENT (OUTPATIENT)
Dept: GASTROENTEROLOGY | Facility: HOSPITAL | Age: 45
End: 2025-08-08
Payer: COMMERCIAL

## 2025-08-08 ENCOUNTER — HOSPITAL ENCOUNTER (OUTPATIENT)
Dept: GASTROENTEROLOGY | Facility: HOSPITAL | Age: 45
Setting detail: OUTPATIENT SURGERY
End: 2025-08-08
Attending: PHYSICIAN ASSISTANT
Payer: COMMERCIAL

## 2025-08-08 RX ORDER — PROPOFOL 10 MG/ML
INJECTION, EMULSION INTRAVENOUS AS NEEDED
Status: DISCONTINUED | OUTPATIENT
Start: 2025-08-08 | End: 2025-08-08

## 2025-08-08 RX ORDER — LIDOCAINE HYDROCHLORIDE 10 MG/ML
INJECTION, SOLUTION EPIDURAL; INFILTRATION; INTRACAUDAL; PERINEURAL AS NEEDED
Status: DISCONTINUED | OUTPATIENT
Start: 2025-08-08 | End: 2025-08-08

## 2025-08-08 RX ORDER — SODIUM CHLORIDE, SODIUM LACTATE, POTASSIUM CHLORIDE, CALCIUM CHLORIDE 600; 310; 30; 20 MG/100ML; MG/100ML; MG/100ML; MG/100ML
INJECTION, SOLUTION INTRAVENOUS CONTINUOUS PRN
Status: DISCONTINUED | OUTPATIENT
Start: 2025-08-08 | End: 2025-08-08

## 2025-08-08 RX ORDER — GLYCOPYRROLATE 0.2 MG/ML
INJECTION INTRAMUSCULAR; INTRAVENOUS AS NEEDED
Status: DISCONTINUED | OUTPATIENT
Start: 2025-08-08 | End: 2025-08-08

## 2025-08-08 RX ADMIN — PROPOFOL 50 MG: 10 INJECTION, EMULSION INTRAVENOUS at 13:53

## 2025-08-08 RX ADMIN — PROPOFOL 50 MG: 10 INJECTION, EMULSION INTRAVENOUS at 13:57

## 2025-08-08 RX ADMIN — PROPOFOL 50 MG: 10 INJECTION, EMULSION INTRAVENOUS at 13:40

## 2025-08-08 RX ADMIN — PROPOFOL 50 MG: 10 INJECTION, EMULSION INTRAVENOUS at 13:38

## 2025-08-08 RX ADMIN — LIDOCAINE HYDROCHLORIDE 50 MG: 10 INJECTION, SOLUTION EPIDURAL; INFILTRATION; INTRACAUDAL at 13:36

## 2025-08-08 RX ADMIN — GLYCOPYRROLATE 0.1 MG: 0.2 INJECTION, SOLUTION INTRAMUSCULAR; INTRAVENOUS at 13:31

## 2025-08-08 RX ADMIN — PROPOFOL 100 MG: 10 INJECTION, EMULSION INTRAVENOUS at 13:36

## 2025-08-08 RX ADMIN — PROPOFOL 50 MG: 10 INJECTION, EMULSION INTRAVENOUS at 13:42

## 2025-08-08 RX ADMIN — SODIUM CHLORIDE, SODIUM LACTATE, POTASSIUM CHLORIDE, AND CALCIUM CHLORIDE: .6; .31; .03; .02 INJECTION, SOLUTION INTRAVENOUS at 13:19

## 2025-08-08 RX ADMIN — PROPOFOL 50 MG: 10 INJECTION, EMULSION INTRAVENOUS at 13:48

## 2025-08-08 RX ADMIN — PROPOFOL 50 MG: 10 INJECTION, EMULSION INTRAVENOUS at 13:45

## 2025-08-08 RX ADMIN — PROPOFOL 50 MG: 10 INJECTION, EMULSION INTRAVENOUS at 14:01

## 2025-08-08 RX ADMIN — PROPOFOL 50 MG: 10 INJECTION, EMULSION INTRAVENOUS at 13:49
